# Patient Record
Sex: MALE | Race: WHITE | NOT HISPANIC OR LATINO | Employment: FULL TIME | URBAN - METROPOLITAN AREA
[De-identification: names, ages, dates, MRNs, and addresses within clinical notes are randomized per-mention and may not be internally consistent; named-entity substitution may affect disease eponyms.]

---

## 2021-09-28 ENCOUNTER — APPOINTMENT (OUTPATIENT)
Dept: GENERAL RADIOLOGY | Facility: HOSPITAL | Age: 46
End: 2021-09-28

## 2021-09-28 ENCOUNTER — APPOINTMENT (OUTPATIENT)
Dept: CT IMAGING | Facility: HOSPITAL | Age: 46
End: 2021-09-28

## 2021-09-28 ENCOUNTER — HOSPITAL ENCOUNTER (INPATIENT)
Facility: HOSPITAL | Age: 46
LOS: 3 days | Discharge: HOME | End: 2021-10-01
Attending: EMERGENCY MEDICINE | Admitting: INTERNAL MEDICINE

## 2021-09-28 DIAGNOSIS — J93.9 PNEUMOTHORAX ON LEFT: Primary | ICD-10-CM

## 2021-09-28 DIAGNOSIS — R26.2 DIFFICULTY WALKING: ICD-10-CM

## 2021-09-28 DIAGNOSIS — Z78.9 DECREASED ACTIVITIES OF DAILY LIVING (ADL): ICD-10-CM

## 2021-09-28 LAB
ALBUMIN SERPL-MCNC: 4.6 G/DL (ref 3.5–5.2)
ALBUMIN/GLOB SERPL: 2.1 G/DL
ALP SERPL-CCNC: 83 U/L (ref 39–117)
ALT SERPL W P-5'-P-CCNC: 27 U/L (ref 1–41)
ANION GAP SERPL CALCULATED.3IONS-SCNC: 15.1 MMOL/L (ref 5–15)
ANION GAP SERPL CALCULATED.3IONS-SCNC: 8.9 MMOL/L (ref 5–15)
AST SERPL-CCNC: 32 U/L (ref 1–40)
BASOPHILS # BLD AUTO: 0.02 10*3/MM3 (ref 0–0.2)
BASOPHILS # BLD AUTO: 0.04 10*3/MM3 (ref 0–0.2)
BASOPHILS NFR BLD AUTO: 0.2 % (ref 0–1.5)
BASOPHILS NFR BLD AUTO: 0.5 % (ref 0–1.5)
BILIRUB SERPL-MCNC: 0.4 MG/DL (ref 0–1.2)
BUN SERPL-MCNC: 15 MG/DL (ref 6–20)
BUN SERPL-MCNC: 15 MG/DL (ref 6–20)
BUN/CREAT SERPL: 13.2 (ref 7–25)
BUN/CREAT SERPL: 14.7 (ref 7–25)
CALCIUM SPEC-SCNC: 9.3 MG/DL (ref 8.6–10.5)
CALCIUM SPEC-SCNC: 9.4 MG/DL (ref 8.6–10.5)
CHLORIDE SERPL-SCNC: 102 MMOL/L (ref 98–107)
CHLORIDE SERPL-SCNC: 105 MMOL/L (ref 98–107)
CO2 SERPL-SCNC: 22.9 MMOL/L (ref 22–29)
CO2 SERPL-SCNC: 25.1 MMOL/L (ref 22–29)
CREAT SERPL-MCNC: 1.02 MG/DL (ref 0.76–1.27)
CREAT SERPL-MCNC: 1.14 MG/DL (ref 0.76–1.27)
D-LACTATE SERPL-SCNC: 1.3 MMOL/L (ref 0.5–2)
D-LACTATE SERPL-SCNC: 5.4 MMOL/L (ref 0.5–2)
DEPRECATED RDW RBC AUTO: 44.3 FL (ref 37–54)
DEPRECATED RDW RBC AUTO: 45.1 FL (ref 37–54)
EOSINOPHIL # BLD AUTO: 0 10*3/MM3 (ref 0–0.4)
EOSINOPHIL # BLD AUTO: 0.1 10*3/MM3 (ref 0–0.4)
EOSINOPHIL NFR BLD AUTO: 0 % (ref 0.3–6.2)
EOSINOPHIL NFR BLD AUTO: 1.4 % (ref 0.3–6.2)
ERYTHROCYTE [DISTWIDTH] IN BLOOD BY AUTOMATED COUNT: 13.1 % (ref 12.3–15.4)
ERYTHROCYTE [DISTWIDTH] IN BLOOD BY AUTOMATED COUNT: 13.1 % (ref 12.3–15.4)
GFR SERPL CREATININE-BSD FRML MDRD: 69 ML/MIN/1.73
GFR SERPL CREATININE-BSD FRML MDRD: 78 ML/MIN/1.73
GLOBULIN UR ELPH-MCNC: 2.2 GM/DL
GLUCOSE SERPL-MCNC: 146 MG/DL (ref 65–99)
GLUCOSE SERPL-MCNC: 180 MG/DL (ref 65–99)
HCT VFR BLD AUTO: 45.2 % (ref 37.5–51)
HCT VFR BLD AUTO: 47.6 % (ref 37.5–51)
HGB BLD-MCNC: 15.2 G/DL (ref 13–17.7)
HGB BLD-MCNC: 15.7 G/DL (ref 13–17.7)
HOLD SPECIMEN: NORMAL
HOLD SPECIMEN: NORMAL
IMM GRANULOCYTES # BLD AUTO: 0.02 10*3/MM3 (ref 0–0.05)
IMM GRANULOCYTES # BLD AUTO: 0.03 10*3/MM3 (ref 0–0.05)
IMM GRANULOCYTES NFR BLD AUTO: 0.3 % (ref 0–0.5)
IMM GRANULOCYTES NFR BLD AUTO: 0.3 % (ref 0–0.5)
INR PPP: 0.99 (ref 2–3)
LYMPHOCYTES # BLD AUTO: 0.43 10*3/MM3 (ref 0.7–3.1)
LYMPHOCYTES # BLD AUTO: 2.01 10*3/MM3 (ref 0.7–3.1)
LYMPHOCYTES NFR BLD AUTO: 27.2 % (ref 19.6–45.3)
LYMPHOCYTES NFR BLD AUTO: 4 % (ref 19.6–45.3)
MAGNESIUM SERPL-MCNC: 2 MG/DL (ref 1.6–2.6)
MCH RBC QN AUTO: 31 PG (ref 26.6–33)
MCH RBC QN AUTO: 31.1 PG (ref 26.6–33)
MCHC RBC AUTO-ENTMCNC: 33 G/DL (ref 31.5–35.7)
MCHC RBC AUTO-ENTMCNC: 33.6 G/DL (ref 31.5–35.7)
MCV RBC AUTO: 92.4 FL (ref 79–97)
MCV RBC AUTO: 94.1 FL (ref 79–97)
MONOCYTES # BLD AUTO: 0.11 10*3/MM3 (ref 0.1–0.9)
MONOCYTES # BLD AUTO: 0.6 10*3/MM3 (ref 0.1–0.9)
MONOCYTES NFR BLD AUTO: 1 % (ref 5–12)
MONOCYTES NFR BLD AUTO: 8.1 % (ref 5–12)
NEUTROPHILS NFR BLD AUTO: 10.28 10*3/MM3 (ref 1.7–7)
NEUTROPHILS NFR BLD AUTO: 4.61 10*3/MM3 (ref 1.7–7)
NEUTROPHILS NFR BLD AUTO: 62.5 % (ref 42.7–76)
NEUTROPHILS NFR BLD AUTO: 94.5 % (ref 42.7–76)
NRBC BLD AUTO-RTO: 0 /100 WBC (ref 0–0.2)
NRBC BLD AUTO-RTO: 0 /100 WBC (ref 0–0.2)
NT-PROBNP SERPL-MCNC: 26.4 PG/ML (ref 0–450)
PHOSPHATE SERPL-MCNC: 2.1 MG/DL (ref 2.5–4.5)
PLATELET # BLD AUTO: 230 10*3/MM3 (ref 140–450)
PLATELET # BLD AUTO: 254 10*3/MM3 (ref 140–450)
PMV BLD AUTO: 9.8 FL (ref 6–12)
PMV BLD AUTO: 9.9 FL (ref 6–12)
POTASSIUM SERPL-SCNC: 4.1 MMOL/L (ref 3.5–5.2)
POTASSIUM SERPL-SCNC: 4.8 MMOL/L (ref 3.5–5.2)
PROT SERPL-MCNC: 6.8 G/DL (ref 6–8.5)
PROTHROMBIN TIME: 10.9 SECONDS (ref 9.4–12)
RBC # BLD AUTO: 4.89 10*6/MM3 (ref 4.14–5.8)
RBC # BLD AUTO: 5.06 10*6/MM3 (ref 4.14–5.8)
SODIUM SERPL-SCNC: 139 MMOL/L (ref 136–145)
SODIUM SERPL-SCNC: 140 MMOL/L (ref 136–145)
TROPONIN I SERPL-MCNC: 0.01 NG/ML (ref 0–0.6)
TROPONIN T SERPL-MCNC: <0.01 NG/ML (ref 0–0.03)
WBC # BLD AUTO: 10.87 10*3/MM3 (ref 3.4–10.8)
WBC # BLD AUTO: 7.38 10*3/MM3 (ref 3.4–10.8)
WHOLE BLOOD HOLD SPECIMEN: NORMAL
WHOLE BLOOD HOLD SPECIMEN: NORMAL

## 2021-09-28 PROCEDURE — 71045 X-RAY EXAM CHEST 1 VIEW: CPT

## 2021-09-28 PROCEDURE — 0W9B30Z DRAINAGE OF LEFT PLEURAL CAVITY WITH DRAINAGE DEVICE, PERCUTANEOUS APPROACH: ICD-10-PCS | Performed by: EMERGENCY MEDICINE

## 2021-09-28 PROCEDURE — 25010000002 LORAZEPAM PER 2 MG

## 2021-09-28 PROCEDURE — 80053 COMPREHEN METABOLIC PANEL: CPT | Performed by: HOSPITALIST

## 2021-09-28 PROCEDURE — 83880 ASSAY OF NATRIURETIC PEPTIDE: CPT | Performed by: EMERGENCY MEDICINE

## 2021-09-28 PROCEDURE — 84484 ASSAY OF TROPONIN QUANT: CPT

## 2021-09-28 PROCEDURE — 99285 EMERGENCY DEPT VISIT HI MDM: CPT

## 2021-09-28 PROCEDURE — 85610 PROTHROMBIN TIME: CPT | Performed by: HOSPITALIST

## 2021-09-28 PROCEDURE — 84100 ASSAY OF PHOSPHORUS: CPT | Performed by: HOSPITALIST

## 2021-09-28 PROCEDURE — 85025 COMPLETE CBC W/AUTO DIFF WBC: CPT | Performed by: EMERGENCY MEDICINE

## 2021-09-28 PROCEDURE — 87040 BLOOD CULTURE FOR BACTERIA: CPT | Performed by: EMERGENCY MEDICINE

## 2021-09-28 PROCEDURE — 83735 ASSAY OF MAGNESIUM: CPT | Performed by: HOSPITALIST

## 2021-09-28 PROCEDURE — 25010000002 ENOXAPARIN PER 10 MG: Performed by: HOSPITALIST

## 2021-09-28 PROCEDURE — 93010 ELECTROCARDIOGRAM REPORT: CPT | Performed by: SPECIALIST

## 2021-09-28 PROCEDURE — 93005 ELECTROCARDIOGRAM TRACING: CPT | Performed by: EMERGENCY MEDICINE

## 2021-09-28 PROCEDURE — 83605 ASSAY OF LACTIC ACID: CPT | Performed by: EMERGENCY MEDICINE

## 2021-09-28 PROCEDURE — 85025 COMPLETE CBC W/AUTO DIFF WBC: CPT | Performed by: HOSPITALIST

## 2021-09-28 PROCEDURE — 93005 ELECTROCARDIOGRAM TRACING: CPT

## 2021-09-28 PROCEDURE — 94640 AIRWAY INHALATION TREATMENT: CPT

## 2021-09-28 PROCEDURE — 71260 CT THORAX DX C+: CPT

## 2021-09-28 PROCEDURE — G0432 EIA HIV-1/HIV-2 SCREEN: HCPCS | Performed by: INTERNAL MEDICINE

## 2021-09-28 PROCEDURE — 0 IOPAMIDOL PER 1 ML: Performed by: EMERGENCY MEDICINE

## 2021-09-28 PROCEDURE — 99223 1ST HOSP IP/OBS HIGH 75: CPT | Performed by: HOSPITALIST

## 2021-09-28 PROCEDURE — 84484 ASSAY OF TROPONIN QUANT: CPT | Performed by: EMERGENCY MEDICINE

## 2021-09-28 RX ORDER — TRAMADOL HYDROCHLORIDE 50 MG/1
25 TABLET ORAL EVERY 6 HOURS PRN
Status: DISCONTINUED | OUTPATIENT
Start: 2021-09-28 | End: 2021-10-01 | Stop reason: HOSPADM

## 2021-09-28 RX ORDER — SODIUM CHLORIDE 0.9 % (FLUSH) 0.9 %
10 SYRINGE (ML) INJECTION EVERY 12 HOURS SCHEDULED
Status: DISCONTINUED | OUTPATIENT
Start: 2021-09-28 | End: 2021-10-01 | Stop reason: HOSPADM

## 2021-09-28 RX ORDER — ONDANSETRON 4 MG/1
4 TABLET, FILM COATED ORAL EVERY 6 HOURS PRN
Status: DISCONTINUED | OUTPATIENT
Start: 2021-09-28 | End: 2021-10-01 | Stop reason: HOSPADM

## 2021-09-28 RX ORDER — SODIUM CHLORIDE 9 MG/ML
75 INJECTION, SOLUTION INTRAVENOUS CONTINUOUS
Status: DISCONTINUED | OUTPATIENT
Start: 2021-09-28 | End: 2021-09-30

## 2021-09-28 RX ORDER — LIDOCAINE HYDROCHLORIDE 10 MG/ML
INJECTION, SOLUTION EPIDURAL; INFILTRATION; INTRACAUDAL; PERINEURAL
Status: DISPENSED
Start: 2021-09-28 | End: 2021-09-29

## 2021-09-28 RX ORDER — FLUOXETINE HYDROCHLORIDE 20 MG/1
20 CAPSULE ORAL DAILY
Status: DISCONTINUED | OUTPATIENT
Start: 2021-09-28 | End: 2021-09-28

## 2021-09-28 RX ORDER — ETOMIDATE 2 MG/ML
INJECTION INTRAVENOUS
Status: COMPLETED
Start: 2021-09-28 | End: 2021-09-28

## 2021-09-28 RX ORDER — ETOMIDATE 2 MG/ML
10 INJECTION INTRAVENOUS ONCE
Status: COMPLETED | OUTPATIENT
Start: 2021-09-28 | End: 2021-09-28

## 2021-09-28 RX ORDER — BISACODYL 10 MG
10 SUPPOSITORY, RECTAL RECTAL DAILY PRN
Status: DISCONTINUED | OUTPATIENT
Start: 2021-09-28 | End: 2021-10-01 | Stop reason: HOSPADM

## 2021-09-28 RX ORDER — ARFORMOTEROL TARTRATE 15 UG/2ML
15 SOLUTION RESPIRATORY (INHALATION)
Status: DISCONTINUED | OUTPATIENT
Start: 2021-09-28 | End: 2021-10-01 | Stop reason: HOSPADM

## 2021-09-28 RX ORDER — CHOLECALCIFEROL (VITAMIN D3) 125 MCG
5 CAPSULE ORAL NIGHTLY PRN
Status: DISCONTINUED | OUTPATIENT
Start: 2021-09-28 | End: 2021-10-01 | Stop reason: HOSPADM

## 2021-09-28 RX ORDER — NITROGLYCERIN 0.4 MG/1
0.4 TABLET SUBLINGUAL
Status: DISCONTINUED | OUTPATIENT
Start: 2021-09-28 | End: 2021-10-01 | Stop reason: HOSPADM

## 2021-09-28 RX ORDER — SODIUM CHLORIDE 0.9 % (FLUSH) 0.9 %
10 SYRINGE (ML) INJECTION AS NEEDED
Status: DISCONTINUED | OUTPATIENT
Start: 2021-09-28 | End: 2021-10-01 | Stop reason: HOSPADM

## 2021-09-28 RX ORDER — POLYETHYLENE GLYCOL 3350 17 G/17G
17 POWDER, FOR SOLUTION ORAL DAILY PRN
Status: DISCONTINUED | OUTPATIENT
Start: 2021-09-28 | End: 2021-10-01 | Stop reason: HOSPADM

## 2021-09-28 RX ORDER — IPRATROPIUM BROMIDE AND ALBUTEROL SULFATE 2.5; .5 MG/3ML; MG/3ML
3 SOLUTION RESPIRATORY (INHALATION)
Status: DISCONTINUED | OUTPATIENT
Start: 2021-09-28 | End: 2021-10-01 | Stop reason: HOSPADM

## 2021-09-28 RX ORDER — HYDROXYZINE HYDROCHLORIDE 25 MG/1
25 TABLET, FILM COATED ORAL 3 TIMES DAILY PRN
Status: DISCONTINUED | OUTPATIENT
Start: 2021-09-28 | End: 2021-10-01 | Stop reason: HOSPADM

## 2021-09-28 RX ORDER — BISACODYL 5 MG/1
5 TABLET, DELAYED RELEASE ORAL DAILY PRN
Status: DISCONTINUED | OUTPATIENT
Start: 2021-09-28 | End: 2021-10-01 | Stop reason: HOSPADM

## 2021-09-28 RX ORDER — AMOXICILLIN 250 MG
2 CAPSULE ORAL 2 TIMES DAILY
Status: DISCONTINUED | OUTPATIENT
Start: 2021-09-28 | End: 2021-10-01 | Stop reason: HOSPADM

## 2021-09-28 RX ORDER — ACETAMINOPHEN 650 MG/1
650 SUPPOSITORY RECTAL EVERY 4 HOURS PRN
Status: DISCONTINUED | OUTPATIENT
Start: 2021-09-28 | End: 2021-10-01 | Stop reason: HOSPADM

## 2021-09-28 RX ORDER — ONDANSETRON 2 MG/ML
4 INJECTION INTRAMUSCULAR; INTRAVENOUS EVERY 6 HOURS PRN
Status: DISCONTINUED | OUTPATIENT
Start: 2021-09-28 | End: 2021-10-01 | Stop reason: HOSPADM

## 2021-09-28 RX ORDER — LORAZEPAM 2 MG/ML
INJECTION INTRAMUSCULAR
Status: COMPLETED
Start: 2021-09-28 | End: 2021-09-28

## 2021-09-28 RX ADMIN — SODIUM CHLORIDE 1000 ML: 9 INJECTION, SOLUTION INTRAVENOUS at 19:24

## 2021-09-28 RX ADMIN — ETOMIDATE 10 MG: 40 INJECTION, SOLUTION INTRAVENOUS at 20:04

## 2021-09-28 RX ADMIN — IOPAMIDOL 100 ML: 755 INJECTION, SOLUTION INTRAVENOUS at 17:10

## 2021-09-28 RX ADMIN — ETOMIDATE 10 MG: 2 INJECTION INTRAVENOUS at 20:00

## 2021-09-28 RX ADMIN — ENOXAPARIN SODIUM 40 MG: 40 INJECTION, SOLUTION INTRAVENOUS; SUBCUTANEOUS at 22:17

## 2021-09-28 RX ADMIN — DOCUSATE SODIUM 50MG AND SENNOSIDES 8.6MG 2 TABLET: 8.6; 5 TABLET, FILM COATED ORAL at 22:17

## 2021-09-28 RX ADMIN — IPRATROPIUM BROMIDE AND ALBUTEROL SULFATE 3 ML: .5; 2.5 SOLUTION RESPIRATORY (INHALATION) at 22:18

## 2021-09-28 RX ADMIN — ETOMIDATE 10 MG: 40 INJECTION, SOLUTION INTRAVENOUS at 20:00

## 2021-09-28 RX ADMIN — ARFORMOTEROL TARTRATE 15 MCG: 15 SOLUTION RESPIRATORY (INHALATION) at 22:18

## 2021-09-28 RX ADMIN — SODIUM CHLORIDE 75 ML/HR: 9 INJECTION, SOLUTION INTRAVENOUS at 19:35

## 2021-09-28 RX ADMIN — LORAZEPAM 2 MG: 2 INJECTION INTRAMUSCULAR; INTRAVENOUS at 20:12

## 2021-09-29 ENCOUNTER — APPOINTMENT (OUTPATIENT)
Dept: CT IMAGING | Facility: HOSPITAL | Age: 46
End: 2021-09-29

## 2021-09-29 ENCOUNTER — APPOINTMENT (OUTPATIENT)
Dept: GENERAL RADIOLOGY | Facility: HOSPITAL | Age: 46
End: 2021-09-29

## 2021-09-29 LAB
ANION GAP SERPL CALCULATED.3IONS-SCNC: 8 MMOL/L (ref 5–15)
BASOPHILS # BLD AUTO: 0.01 10*3/MM3 (ref 0–0.2)
BASOPHILS NFR BLD AUTO: 0.1 % (ref 0–1.5)
BUN SERPL-MCNC: 14 MG/DL (ref 6–20)
BUN/CREAT SERPL: 14.6 (ref 7–25)
CALCIUM SPEC-SCNC: 9 MG/DL (ref 8.6–10.5)
CHLORIDE SERPL-SCNC: 107 MMOL/L (ref 98–107)
CO2 SERPL-SCNC: 23 MMOL/L (ref 22–29)
CREAT SERPL-MCNC: 0.96 MG/DL (ref 0.76–1.27)
DEPRECATED RDW RBC AUTO: 42.2 FL (ref 37–54)
EOSINOPHIL # BLD AUTO: 0 10*3/MM3 (ref 0–0.4)
EOSINOPHIL NFR BLD AUTO: 0 % (ref 0.3–6.2)
ERYTHROCYTE [DISTWIDTH] IN BLOOD BY AUTOMATED COUNT: 12.8 % (ref 12.3–15.4)
GFR SERPL CREATININE-BSD FRML MDRD: 84 ML/MIN/1.73
GLUCOSE SERPL-MCNC: 174 MG/DL (ref 65–99)
HCT VFR BLD AUTO: 42.2 % (ref 37.5–51)
HGB BLD-MCNC: 14.5 G/DL (ref 13–17.7)
HIV1+2 AB SER QL: NORMAL
IMM GRANULOCYTES # BLD AUTO: 0.02 10*3/MM3 (ref 0–0.05)
IMM GRANULOCYTES NFR BLD AUTO: 0.3 % (ref 0–0.5)
INR PPP: 0.97 (ref 2–3)
LYMPHOCYTES # BLD AUTO: 0.83 10*3/MM3 (ref 0.7–3.1)
LYMPHOCYTES NFR BLD AUTO: 10.7 % (ref 19.6–45.3)
MAGNESIUM SERPL-MCNC: 2 MG/DL (ref 1.6–2.6)
MCH RBC QN AUTO: 31.1 PG (ref 26.6–33)
MCHC RBC AUTO-ENTMCNC: 34.4 G/DL (ref 31.5–35.7)
MCV RBC AUTO: 90.6 FL (ref 79–97)
MONOCYTES # BLD AUTO: 0.54 10*3/MM3 (ref 0.1–0.9)
MONOCYTES NFR BLD AUTO: 6.9 % (ref 5–12)
NEUTROPHILS NFR BLD AUTO: 6.38 10*3/MM3 (ref 1.7–7)
NEUTROPHILS NFR BLD AUTO: 82 % (ref 42.7–76)
NRBC BLD AUTO-RTO: 0 /100 WBC (ref 0–0.2)
PHOSPHATE SERPL-MCNC: 3.2 MG/DL (ref 2.5–4.5)
PLATELET # BLD AUTO: 219 10*3/MM3 (ref 140–450)
PMV BLD AUTO: 10.2 FL (ref 6–12)
POTASSIUM SERPL-SCNC: 4.6 MMOL/L (ref 3.5–5.2)
PROTHROMBIN TIME: 10.7 SECONDS (ref 9.4–12)
RBC # BLD AUTO: 4.66 10*6/MM3 (ref 4.14–5.8)
SODIUM SERPL-SCNC: 138 MMOL/L (ref 136–145)
WBC # BLD AUTO: 7.78 10*3/MM3 (ref 3.4–10.8)

## 2021-09-29 PROCEDURE — 85610 PROTHROMBIN TIME: CPT | Performed by: HOSPITALIST

## 2021-09-29 PROCEDURE — 99233 SBSQ HOSP IP/OBS HIGH 50: CPT | Performed by: INTERNAL MEDICINE

## 2021-09-29 PROCEDURE — 25010000002 ENOXAPARIN PER 10 MG: Performed by: HOSPITALIST

## 2021-09-29 PROCEDURE — 97165 OT EVAL LOW COMPLEX 30 MIN: CPT

## 2021-09-29 PROCEDURE — 83735 ASSAY OF MAGNESIUM: CPT | Performed by: HOSPITALIST

## 2021-09-29 PROCEDURE — 94799 UNLISTED PULMONARY SVC/PX: CPT

## 2021-09-29 PROCEDURE — 81332 SERPINA1 GENE: CPT | Performed by: INTERNAL MEDICINE

## 2021-09-29 PROCEDURE — 82103 ALPHA-1-ANTITRYPSIN TOTAL: CPT | Performed by: INTERNAL MEDICINE

## 2021-09-29 PROCEDURE — 97161 PT EVAL LOW COMPLEX 20 MIN: CPT

## 2021-09-29 PROCEDURE — 80048 BASIC METABOLIC PNL TOTAL CA: CPT | Performed by: HOSPITALIST

## 2021-09-29 PROCEDURE — 71045 X-RAY EXAM CHEST 1 VIEW: CPT

## 2021-09-29 PROCEDURE — 84100 ASSAY OF PHOSPHORUS: CPT | Performed by: HOSPITALIST

## 2021-09-29 PROCEDURE — 36415 COLL VENOUS BLD VENIPUNCTURE: CPT | Performed by: HOSPITALIST

## 2021-09-29 PROCEDURE — 85025 COMPLETE CBC W/AUTO DIFF WBC: CPT | Performed by: HOSPITALIST

## 2021-09-29 PROCEDURE — 99223 1ST HOSP IP/OBS HIGH 75: CPT | Performed by: INTERNAL MEDICINE

## 2021-09-29 PROCEDURE — 82104 ALPHA-1-ANTITRYPSIN PHENO: CPT | Performed by: INTERNAL MEDICINE

## 2021-09-29 PROCEDURE — 71250 CT THORAX DX C-: CPT

## 2021-09-29 RX ORDER — TRAZODONE HYDROCHLORIDE 50 MG/1
50 TABLET ORAL NIGHTLY PRN
Status: DISCONTINUED | OUTPATIENT
Start: 2021-09-29 | End: 2021-10-01 | Stop reason: HOSPADM

## 2021-09-29 RX ADMIN — ARFORMOTEROL TARTRATE 15 MCG: 15 SOLUTION RESPIRATORY (INHALATION) at 18:45

## 2021-09-29 RX ADMIN — IPRATROPIUM BROMIDE AND ALBUTEROL SULFATE 3 ML: .5; 2.5 SOLUTION RESPIRATORY (INHALATION) at 07:35

## 2021-09-29 RX ADMIN — ARFORMOTEROL TARTRATE 15 MCG: 15 SOLUTION RESPIRATORY (INHALATION) at 07:35

## 2021-09-29 RX ADMIN — ENOXAPARIN SODIUM 40 MG: 40 INJECTION, SOLUTION INTRAVENOUS; SUBCUTANEOUS at 20:15

## 2021-09-29 RX ADMIN — IPRATROPIUM BROMIDE AND ALBUTEROL SULFATE 3 ML: .5; 2.5 SOLUTION RESPIRATORY (INHALATION) at 13:04

## 2021-09-29 RX ADMIN — SODIUM CHLORIDE 75 ML/HR: 9 INJECTION, SOLUTION INTRAVENOUS at 15:00

## 2021-09-29 RX ADMIN — IPRATROPIUM BROMIDE AND ALBUTEROL SULFATE 3 ML: .5; 2.5 SOLUTION RESPIRATORY (INHALATION) at 18:45

## 2021-09-29 RX ADMIN — SODIUM CHLORIDE, PRESERVATIVE FREE 10 ML: 5 INJECTION INTRAVENOUS at 11:00

## 2021-09-29 RX ADMIN — SODIUM CHLORIDE, PRESERVATIVE FREE 10 ML: 5 INJECTION INTRAVENOUS at 20:14

## 2021-09-30 ENCOUNTER — APPOINTMENT (OUTPATIENT)
Dept: GENERAL RADIOLOGY | Facility: HOSPITAL | Age: 46
End: 2021-09-30

## 2021-09-30 LAB
ANION GAP SERPL CALCULATED.3IONS-SCNC: 7.2 MMOL/L (ref 5–15)
BASOPHILS # BLD AUTO: 0.02 10*3/MM3 (ref 0–0.2)
BASOPHILS NFR BLD AUTO: 0.3 % (ref 0–1.5)
BUN SERPL-MCNC: 17 MG/DL (ref 6–20)
BUN/CREAT SERPL: 15.2 (ref 7–25)
CALCIUM SPEC-SCNC: 8.7 MG/DL (ref 8.6–10.5)
CHLORIDE SERPL-SCNC: 108 MMOL/L (ref 98–107)
CO2 SERPL-SCNC: 24.8 MMOL/L (ref 22–29)
CREAT SERPL-MCNC: 1.12 MG/DL (ref 0.76–1.27)
DEPRECATED RDW RBC AUTO: 44.2 FL (ref 37–54)
EOSINOPHIL # BLD AUTO: 0.09 10*3/MM3 (ref 0–0.4)
EOSINOPHIL NFR BLD AUTO: 1.3 % (ref 0.3–6.2)
ERYTHROCYTE [DISTWIDTH] IN BLOOD BY AUTOMATED COUNT: 13.2 % (ref 12.3–15.4)
GFR SERPL CREATININE-BSD FRML MDRD: 70 ML/MIN/1.73
GLUCOSE SERPL-MCNC: 94 MG/DL (ref 65–99)
HCT VFR BLD AUTO: 40.6 % (ref 37.5–51)
HGB BLD-MCNC: 13.7 G/DL (ref 13–17.7)
IMM GRANULOCYTES # BLD AUTO: 0.02 10*3/MM3 (ref 0–0.05)
IMM GRANULOCYTES NFR BLD AUTO: 0.3 % (ref 0–0.5)
INR PPP: 0.93 (ref 2–3)
LYMPHOCYTES # BLD AUTO: 1.61 10*3/MM3 (ref 0.7–3.1)
LYMPHOCYTES NFR BLD AUTO: 23.2 % (ref 19.6–45.3)
MAGNESIUM SERPL-MCNC: 2 MG/DL (ref 1.6–2.6)
MCH RBC QN AUTO: 30.9 PG (ref 26.6–33)
MCHC RBC AUTO-ENTMCNC: 33.7 G/DL (ref 31.5–35.7)
MCV RBC AUTO: 91.4 FL (ref 79–97)
MONOCYTES # BLD AUTO: 0.51 10*3/MM3 (ref 0.1–0.9)
MONOCYTES NFR BLD AUTO: 7.3 % (ref 5–12)
NEUTROPHILS NFR BLD AUTO: 4.7 10*3/MM3 (ref 1.7–7)
NEUTROPHILS NFR BLD AUTO: 67.6 % (ref 42.7–76)
NRBC BLD AUTO-RTO: 0 /100 WBC (ref 0–0.2)
PHOSPHATE SERPL-MCNC: 2.9 MG/DL (ref 2.5–4.5)
PLATELET # BLD AUTO: 230 10*3/MM3 (ref 140–450)
PMV BLD AUTO: 10.5 FL (ref 6–12)
POTASSIUM SERPL-SCNC: 4.2 MMOL/L (ref 3.5–5.2)
PROTHROMBIN TIME: 10.4 SECONDS (ref 9.4–12)
RBC # BLD AUTO: 4.44 10*6/MM3 (ref 4.14–5.8)
SODIUM SERPL-SCNC: 140 MMOL/L (ref 136–145)
WBC # BLD AUTO: 6.95 10*3/MM3 (ref 3.4–10.8)

## 2021-09-30 PROCEDURE — 85610 PROTHROMBIN TIME: CPT | Performed by: HOSPITALIST

## 2021-09-30 PROCEDURE — 36415 COLL VENOUS BLD VENIPUNCTURE: CPT | Performed by: HOSPITALIST

## 2021-09-30 PROCEDURE — 94760 N-INVAS EAR/PLS OXIMETRY 1: CPT

## 2021-09-30 PROCEDURE — 94799 UNLISTED PULMONARY SVC/PX: CPT

## 2021-09-30 PROCEDURE — 99233 SBSQ HOSP IP/OBS HIGH 50: CPT | Performed by: INTERNAL MEDICINE

## 2021-09-30 PROCEDURE — 71045 X-RAY EXAM CHEST 1 VIEW: CPT

## 2021-09-30 PROCEDURE — 99232 SBSQ HOSP IP/OBS MODERATE 35: CPT | Performed by: INTERNAL MEDICINE

## 2021-09-30 PROCEDURE — 84100 ASSAY OF PHOSPHORUS: CPT | Performed by: HOSPITALIST

## 2021-09-30 PROCEDURE — 80048 BASIC METABOLIC PNL TOTAL CA: CPT | Performed by: HOSPITALIST

## 2021-09-30 PROCEDURE — 83735 ASSAY OF MAGNESIUM: CPT | Performed by: HOSPITALIST

## 2021-09-30 PROCEDURE — 85025 COMPLETE CBC W/AUTO DIFF WBC: CPT | Performed by: HOSPITALIST

## 2021-09-30 RX ADMIN — ARFORMOTEROL TARTRATE 15 MCG: 15 SOLUTION RESPIRATORY (INHALATION) at 19:50

## 2021-09-30 RX ADMIN — ARFORMOTEROL TARTRATE 15 MCG: 15 SOLUTION RESPIRATORY (INHALATION) at 07:23

## 2021-09-30 RX ADMIN — SODIUM CHLORIDE, PRESERVATIVE FREE 10 ML: 5 INJECTION INTRAVENOUS at 10:10

## 2021-09-30 RX ADMIN — IPRATROPIUM BROMIDE AND ALBUTEROL SULFATE 3 ML: .5; 2.5 SOLUTION RESPIRATORY (INHALATION) at 07:23

## 2021-09-30 RX ADMIN — SODIUM CHLORIDE, PRESERVATIVE FREE 10 ML: 5 INJECTION INTRAVENOUS at 20:17

## 2021-09-30 RX ADMIN — SODIUM CHLORIDE 75 ML/HR: 9 INJECTION, SOLUTION INTRAVENOUS at 03:20

## 2021-09-30 RX ADMIN — IPRATROPIUM BROMIDE AND ALBUTEROL SULFATE 3 ML: .5; 2.5 SOLUTION RESPIRATORY (INHALATION) at 12:48

## 2021-09-30 RX ADMIN — IPRATROPIUM BROMIDE AND ALBUTEROL SULFATE 3 ML: .5; 2.5 SOLUTION RESPIRATORY (INHALATION) at 23:49

## 2021-09-30 RX ADMIN — IPRATROPIUM BROMIDE AND ALBUTEROL SULFATE 3 ML: .5; 2.5 SOLUTION RESPIRATORY (INHALATION) at 00:42

## 2021-10-01 ENCOUNTER — APPOINTMENT (OUTPATIENT)
Dept: GENERAL RADIOLOGY | Facility: HOSPITAL | Age: 46
End: 2021-10-01

## 2021-10-01 ENCOUNTER — READMISSION MANAGEMENT (OUTPATIENT)
Dept: CALL CENTER | Facility: HOSPITAL | Age: 46
End: 2021-10-01

## 2021-10-01 VITALS
OXYGEN SATURATION: 97 % | DIASTOLIC BLOOD PRESSURE: 77 MMHG | TEMPERATURE: 97.7 F | HEART RATE: 97 BPM | BODY MASS INDEX: 19.95 KG/M2 | HEIGHT: 68 IN | RESPIRATION RATE: 20 BRPM | SYSTOLIC BLOOD PRESSURE: 112 MMHG | WEIGHT: 131.61 LBS

## 2021-10-01 PROCEDURE — 94799 UNLISTED PULMONARY SVC/PX: CPT

## 2021-10-01 PROCEDURE — 71046 X-RAY EXAM CHEST 2 VIEWS: CPT

## 2021-10-01 PROCEDURE — 99232 SBSQ HOSP IP/OBS MODERATE 35: CPT | Performed by: INTERNAL MEDICINE

## 2021-10-01 PROCEDURE — 71045 X-RAY EXAM CHEST 1 VIEW: CPT

## 2021-10-01 PROCEDURE — 99238 HOSP IP/OBS DSCHRG MGMT 30/<: CPT | Performed by: INTERNAL MEDICINE

## 2021-10-01 RX ORDER — ALBUTEROL SULFATE 90 UG/1
2 AEROSOL, METERED RESPIRATORY (INHALATION) EVERY 4 HOURS PRN
Qty: 8 G | Refills: 0 | Status: SHIPPED | OUTPATIENT
Start: 2021-10-01 | End: 2021-11-10

## 2021-10-01 RX ADMIN — IPRATROPIUM BROMIDE AND ALBUTEROL SULFATE 3 ML: .5; 2.5 SOLUTION RESPIRATORY (INHALATION) at 08:06

## 2021-10-01 RX ADMIN — ARFORMOTEROL TARTRATE 15 MCG: 15 SOLUTION RESPIRATORY (INHALATION) at 08:06

## 2021-10-01 RX ADMIN — IPRATROPIUM BROMIDE AND ALBUTEROL SULFATE 3 ML: .5; 2.5 SOLUTION RESPIRATORY (INHALATION) at 14:25

## 2021-10-01 RX ADMIN — SODIUM CHLORIDE, PRESERVATIVE FREE 10 ML: 5 INJECTION INTRAVENOUS at 11:04

## 2021-10-01 NOTE — PLAN OF CARE
Goal Outcome Evaluation:           Progress: improving  Outcome Summary: Chest tube to water seal and no change to chest xray this evening even though crepitus more noted. Patient trailing RA, practicing IS, and up walking ad valerie.

## 2021-10-01 NOTE — PROGRESS NOTES
Pulmonary / Critical Care Progress Note      Patient Name: Yogesh Vicente  : 1975  MRN: [id number]  Attending:  Mary Carter, *  Date of admission: 2021    Subjective   Subjective   Follow-up for Left pneumothorax    Over past 24 hours:  Chest tube to waterseal  Continued on room air        Today  Chest x-ray reviewed, no pneumothorax noted  Chest tube clamped  Remains on room air  Is hoping to go home today  Denies shortness of breath  Denies chest pain    Review of Systems  General: Denied complaints  Cardiovascular:  Denied complaints  Respiratory: Denied complaints  Gastrointestinal: Denied complaints        Objective   Objective     Vitals:   Temp:  [97.6 °F (36.4 °C)-98.2 °F (36.8 °C)] 98.2 °F (36.8 °C)  Heart Rate:  [] 84  Resp:  [18-22] 22  BP: (114-128)/(77-88) 114/88    Physical Exam   Vital Signs Reviewed   WDWN, Alert, NAD.    HEENT:  PERRL, EOMI.  OP, nares clear  Chest wall: Left anterior chest tube in place, dressing C/D/I; crepitus noted  Chest:  good aeration, clear to auscultation bilaterally, equal rise and fall of chest, no work of breathing noted  CV: RRR, no MGR, pulses 2+, equal.  Abd:  Soft, NT, ND, + BS, no HSM  EXT:  no clubbing, no cyanosis, no edema  Neuro:  A&Ox3, CN grossly intact, no focal deficits.  Skin: No rashes or lesions noted      Result Review    Result Review:  I have personally reviewed the results from the time of this admission to 10/1/2021 08:20 EDT and agree with these findings:  [x]  Laboratory  []  Microbiology  [x]  Radiology  []  EKG/Telemetry   []  Cardiology/Vascular   []  Pathology  []  Old records  []  Other:  Most notable findings include: Chest x-ray with resolution of pneumothorax; improvement in subcu emphysema    Assessment/Plan   Assessment / Plan     Active Hospital Problems:  Active Hospital Problems    Diagnosis    • Pneumothorax          Impression:  Secondary left pneumothorax secondary to ruptured bleb  Acute hypoxic  respiratory failure secondary to above  Hx of bullous emphysema  Pulmonary fibrosis  Severe emphysema without exacerbation  Tobacco abuse  History of amphetamine use     Plan:  CT chest personally reviewed.  Has severe bilateral bullous emphysema at a very young age.   I would try to get the records from the outside hospital that it is thoracic surgery back in 2017  Chest x-ray reviewed this a.m.  No pneumothorax noted.  Chest tube clamped  Well repeat chest x-ray around noon.  If without pneumothorax will remove chest tube currently on room air  Alpha-1 Antitrypsin and phenotype ordered.  HIV negative.  Continue nebulizers.  Encourage IS.  Encourage up to chair  Repeat chest x-ray was personally reviewed showing no pneumothorax after clamping for 4 hours.  Chest tube was removed under sterile conditions.     DVT prophylaxis:  Medical DVT prophylaxis orders are present.    CODE STATUS:   Level Of Support Discussed With: Patient  Code Status: CPR  Medical Interventions (Level of Support Prior to Arrest): Full    Patient can go home.  See us in the office in 1 to 2 weeks    Labs, radiology, microbiology and provider notes were personally reviewed  Patient's case was discussed with the primary service as well as the bedside RN    I will sign off.  Please call with questions.    Electronically signed by SAI Frias, 10/01/21, 10:54 AM EDT.  Electronically signed by Peter Julio MD, 10/01/21, 2:49 PM EDT.

## 2021-10-02 NOTE — OUTREACH NOTE
Prep Survey      Responses   [facility] facility patient discharged from?  [facility]   Is LACE score < 7 ?  Yes   Emergency Room discharge w/ pulse ox?  No   Eligibility  Not Eligible   What are the reasons patient is not eligible?  Other   Discharge diagnosis  Pneumothorax   Does the patient have one of the following disease processes/diagnoses(primary or secondary)?  Other   Does the patient have Home health ordered?  No   Is there a DME ordered?  No   Prep survey completed?  Yes          Nenita Thomason RN

## 2021-10-03 LAB
BACTERIA SPEC AEROBE CULT: NORMAL
BACTERIA SPEC AEROBE CULT: NORMAL
QT INTERVAL: 315 MS

## 2021-10-03 NOTE — DISCHARGE SUMMARY
[facility]         HOSPITALIST  DISCHARGE SUMMARY    Patient Name: Yogesh Vicente  : 1975  MRN: [id number]    Date of Admission: 2021  Date of Discharge:  10/01/2021  Primary Care Physician: Martin Friedman MD    Consults     Date and Time Order Name Status Description    2021  6:32 PM Inpatient Hospitalist Consult Completed     2021  6:25 PM Inpatient Radiology Consult Completed     2021  6:12 PM Inpatient Pulmonology Consult Completed           Active and Resolved Hospital Problems:  Active Hospital Problems    Diagnosis POA   • Pneumothorax [J93.9] Yes   Severe emphysema  Tobacco abuse  Depression/ anxiety  History of amphetamines   Resolved Hospital Problems   No resolved problems to display.       Hospital Course     Hospital Course:  46 year old male who presents with sudden sob that started at 2 PM. His chest xray shows:  Left pneumothorax.  Chest CT shows:  Pneumothorax is evident on the left, with tethering of the lung to the chest wall by scarring producing an atypical pattern of collapse.  A component of the pneumothorax in the upper and mid hemithorax anteriorly measures 12 cm x 6 cm in greatest transverse and AP dimension.  Similar-sized collections are seen in the inferior left hemithorax.  The radiologist would estimate that at least 50 percent of the left hemithorax is occupied by pneumothorax.    Patient was seen in consult by Pulmonology and was managed with chest tube.   Patient did well from this standpoint.  His lungs reexpanded and the chest tube was able to be removed.      Day of Discharge     Vital Signs:    Reviewed and are stable  Physical Exam:               Constitutional: Awake, alert, no acute distress.                          Respiratory: Good air movement bilaterally.  Left chest tube in place. Crepitance noted over the left chest wall                          Cardiovascular: RRR, no murmurs, rubs, or gallops, palpable pedal pulses  bilaterally              Gastrointestinal: Positive bowel sounds, soft, nontender, nondistended              Musculoskeletal: No bilateral ankle edema, no clubbing or cyanosis to extremities              Psychiatric: Appropriate affect, cooperative              Neurologic: Oriented x 3, moving all extremities focal deficits appreciated, Cranial Nerves grossly intact to confrontation, speech clear              Skin: No rashes     Discharge Details        Discharge Medications      New Medications      Instructions Start Date   albuterol sulfate  (90 Base) MCG/ACT inhaler  Commonly known as: PROVENTIL HFA;VENTOLIN HFA;PROAIR HFA   2 puffs, Inhalation, Every 4 Hours PRN             No Known Allergies    Discharge Disposition:  Home or Self Care    Diet:  Regular    Discharge Activity:   Activity Instructions     Other Activity Instructions      Activity Instructions: As tolerated          CODE STATUS:  Code Status and Medical Interventions:   Ordered at: 09/28/21 5545     Level Of Support Discussed With:    Patient     Code Status:    CPR     Medical Interventions (Level of Support Prior to Arrest):    Full         No future appointments.    Additional Instructions for the Follow-ups that You Need to Schedule     Call MD With Problems / Concerns   As directed      Instructions: Shortness of breath, fevers or new symptoms concerning to the patient    Order Comments: Instructions: Shortness of breath, fevers or new symptoms concerning to the patient          Discharge Follow-up with PCP   As directed       Currently Documented PCP:    Martin Friedman MD    PCP Phone Number:    [phone]     Follow Up Details: hospital follow up 1 week         Discharge Follow-up with Specified Provider: Dr. Julio; 2 Weeks   As directed      To: Dr. Julio    Follow Up: 2 Weeks    Follow Up Details: hosp f/u spontaneous pneumothorax               Pertinent  and/or Most Recent Results     PROCEDURES:   Chest tube  insertion    LAB RESULTS:      Lab 09/30/21 0435 09/29/21  0537 09/28/21 1859 09/28/21  1501 09/28/21  1454   WBC 6.95 7.78 10.87*  --  7.38   HEMOGLOBIN 13.7 14.5 15.2  --  15.7   HEMATOCRIT 40.6 42.2 45.2  --  47.6   PLATELETS 230 219 230  --  254   NEUTROS ABS 4.70 6.38 10.28*  --  4.61   IMMATURE GRANS (ABS) 0.02 0.02 0.03  --  0.02   LYMPHS ABS 1.61 0.83 0.43*  --  2.01   MONOS ABS 0.51 0.54 0.11  --  0.60   EOS ABS 0.09 0.00 0.00  --  0.10   MCV 91.4 90.6 92.4  --  94.1   LACTATE  --   --  1.3 5.4*  --    PROTIME 10.4 10.7 10.9  --   --          Lab 09/30/21  0435 09/29/21  0537 09/28/21 1859 09/28/21  1454   SODIUM 140 138 139 140   POTASSIUM 4.2 4.6 4.8 4.1   CHLORIDE 108* 107 105 102   CO2 24.8 23.0 25.1 22.9   ANION GAP 7.2 8.0 8.9 15.1*   BUN 17 14 15 15   CREATININE 1.12 0.96 1.02 1.14   GLUCOSE 94 174* 146* 180*   CALCIUM 8.7 9.0 9.4 9.3   MAGNESIUM 2.0 2.0 2.0  --    PHOSPHORUS 2.9 3.2 2.1*  --          Lab 09/28/21  1454   TOTAL PROTEIN 6.8   ALBUMIN 4.60   GLOBULIN 2.2   ALT (SGPT) 27   AST (SGOT) 32   BILIRUBIN 0.4   ALK PHOS 83         Lab 09/30/21 0435 09/29/21 0537 09/28/21 1859 09/28/21  1454   PROBNP  --   --   --  26.4   TROPONIN T  --   --   --  <0.010   PROTIME 10.4 10.7 10.9  --    INR 0.93* 0.97* 0.99*  --                  Brief Urine Lab Results     None        Microbiology Results (last 10 days)     Procedure Component Value - Date/Time    Blood Culture - Blood, Arm, Right [[id number]] Collected: 09/28/21 1501    Lab Status: Final result Specimen: Blood from Arm, Right Updated: 10/03/21 1516     Blood Culture No growth at 5 days    Blood Culture - Blood, Arm, Left [[id number]] Collected: 09/28/21 1501    Lab Status: Final result Specimen: Blood from Arm, Left Updated: 10/03/21 1516     Blood Culture No growth at 5 days            Labs Pending at Discharge:  Pending Labs     Order Current Status    Alpha - 1 - Antitrypsin Deficiency In process    Alpha - 1 - Antitrypsin Phenotype In  process            Time spent on Discharge including face to face service: 30 minutes    Electronically signed by Mary Carter MD, 10/03/21, 5:42 PM EDT.

## 2021-10-04 ENCOUNTER — HOSPITAL ENCOUNTER (EMERGENCY)
Facility: HOSPITAL | Age: 46
Discharge: HOME | End: 2021-10-04
Attending: EMERGENCY MEDICINE | Admitting: EMERGENCY MEDICINE

## 2021-10-04 ENCOUNTER — APPOINTMENT (OUTPATIENT)
Dept: GENERAL RADIOLOGY | Facility: HOSPITAL | Age: 46
End: 2021-10-04

## 2021-10-04 VITALS
BODY MASS INDEX: 21.58 KG/M2 | HEIGHT: 68 IN | TEMPERATURE: 98.3 F | SYSTOLIC BLOOD PRESSURE: 123 MMHG | RESPIRATION RATE: 22 BRPM | OXYGEN SATURATION: 94 % | DIASTOLIC BLOOD PRESSURE: 91 MMHG | WEIGHT: 142.42 LBS | HEART RATE: 84 BPM

## 2021-10-04 DIAGNOSIS — R06.02 SHORTNESS OF BREATH: Primary | ICD-10-CM

## 2021-10-04 DIAGNOSIS — J44.1 COPD EXACERBATION (HCC): ICD-10-CM

## 2021-10-04 DIAGNOSIS — J93.9 PNEUMOTHORAX ON LEFT: ICD-10-CM

## 2021-10-04 LAB
A1AT PHENOTYP SERPL IFE: NORMAL
A1AT SERPL-MCNC: 159 MG/DL (ref 101–187)
ALBUMIN SERPL-MCNC: 4.1 G/DL (ref 3.5–5.2)
ALBUMIN/GLOB SERPL: 2 G/DL
ALP SERPL-CCNC: 61 U/L (ref 39–117)
ALT SERPL W P-5'-P-CCNC: 20 U/L (ref 1–41)
ANION GAP SERPL CALCULATED.3IONS-SCNC: 10.5 MMOL/L (ref 5–15)
AST SERPL-CCNC: 18 U/L (ref 1–40)
BASOPHILS # BLD AUTO: 0.03 10*3/MM3 (ref 0–0.2)
BASOPHILS NFR BLD AUTO: 0.3 % (ref 0–1.5)
BILIRUB SERPL-MCNC: 0.4 MG/DL (ref 0–1.2)
BUN SERPL-MCNC: 17 MG/DL (ref 6–20)
BUN/CREAT SERPL: 16.5 (ref 7–25)
CALCIUM SPEC-SCNC: 8.7 MG/DL (ref 8.6–10.5)
CHLORIDE SERPL-SCNC: 108 MMOL/L (ref 98–107)
CO2 SERPL-SCNC: 23.5 MMOL/L (ref 22–29)
CREAT SERPL-MCNC: 1.03 MG/DL (ref 0.76–1.27)
D-LACTATE SERPL-SCNC: 1.4 MMOL/L (ref 0.5–2)
DEPRECATED RDW RBC AUTO: 43.1 FL (ref 37–54)
EOSINOPHIL # BLD AUTO: 0.13 10*3/MM3 (ref 0–0.4)
EOSINOPHIL NFR BLD AUTO: 1.2 % (ref 0.3–6.2)
ERYTHROCYTE [DISTWIDTH] IN BLOOD BY AUTOMATED COUNT: 12.8 % (ref 12.3–15.4)
GFR SERPL CREATININE-BSD FRML MDRD: 77 ML/MIN/1.73
GLOBULIN UR ELPH-MCNC: 2.1 GM/DL
GLUCOSE SERPL-MCNC: 107 MG/DL (ref 65–99)
HCT VFR BLD AUTO: 42.4 % (ref 37.5–51)
HGB BLD-MCNC: 14.5 G/DL (ref 13–17.7)
HOLD SPECIMEN: NORMAL
HOLD SPECIMEN: NORMAL
IMM GRANULOCYTES # BLD AUTO: 0.04 10*3/MM3 (ref 0–0.05)
IMM GRANULOCYTES NFR BLD AUTO: 0.4 % (ref 0–0.5)
LYMPHOCYTES # BLD AUTO: 1.22 10*3/MM3 (ref 0.7–3.1)
LYMPHOCYTES NFR BLD AUTO: 11.7 % (ref 19.6–45.3)
MCH RBC QN AUTO: 31.7 PG (ref 26.6–33)
MCHC RBC AUTO-ENTMCNC: 34.2 G/DL (ref 31.5–35.7)
MCV RBC AUTO: 92.6 FL (ref 79–97)
MONOCYTES # BLD AUTO: 0.66 10*3/MM3 (ref 0.1–0.9)
MONOCYTES NFR BLD AUTO: 6.3 % (ref 5–12)
NEUTROPHILS NFR BLD AUTO: 8.39 10*3/MM3 (ref 1.7–7)
NEUTROPHILS NFR BLD AUTO: 80.1 % (ref 42.7–76)
NRBC BLD AUTO-RTO: 0 /100 WBC (ref 0–0.2)
NT-PROBNP SERPL-MCNC: 38.6 PG/ML (ref 0–450)
PLATELET # BLD AUTO: 226 10*3/MM3 (ref 140–450)
PMV BLD AUTO: 10.3 FL (ref 6–12)
POTASSIUM SERPL-SCNC: 4.1 MMOL/L (ref 3.5–5.2)
PROT SERPL-MCNC: 6.2 G/DL (ref 6–8.5)
RBC # BLD AUTO: 4.58 10*6/MM3 (ref 4.14–5.8)
SODIUM SERPL-SCNC: 142 MMOL/L (ref 136–145)
TROPONIN T SERPL-MCNC: <0.01 NG/ML (ref 0–0.03)
WBC # BLD AUTO: 10.47 10*3/MM3 (ref 3.4–10.8)
WHOLE BLOOD HOLD SPECIMEN: NORMAL
WHOLE BLOOD HOLD SPECIMEN: NORMAL

## 2021-10-04 PROCEDURE — 93005 ELECTROCARDIOGRAM TRACING: CPT | Performed by: EMERGENCY MEDICINE

## 2021-10-04 PROCEDURE — 84484 ASSAY OF TROPONIN QUANT: CPT | Performed by: EMERGENCY MEDICINE

## 2021-10-04 PROCEDURE — 71045 X-RAY EXAM CHEST 1 VIEW: CPT

## 2021-10-04 PROCEDURE — 83880 ASSAY OF NATRIURETIC PEPTIDE: CPT | Performed by: EMERGENCY MEDICINE

## 2021-10-04 PROCEDURE — 80053 COMPREHEN METABOLIC PANEL: CPT | Performed by: EMERGENCY MEDICINE

## 2021-10-04 PROCEDURE — 87040 BLOOD CULTURE FOR BACTERIA: CPT | Performed by: EMERGENCY MEDICINE

## 2021-10-04 PROCEDURE — 99284 EMERGENCY DEPT VISIT MOD MDM: CPT

## 2021-10-04 PROCEDURE — 85025 COMPLETE CBC W/AUTO DIFF WBC: CPT | Performed by: EMERGENCY MEDICINE

## 2021-10-04 PROCEDURE — 83605 ASSAY OF LACTIC ACID: CPT | Performed by: EMERGENCY MEDICINE

## 2021-10-04 RX ORDER — SODIUM CHLORIDE 0.9 % (FLUSH) 0.9 %
10 SYRINGE (ML) INJECTION AS NEEDED
Status: DISCONTINUED | OUTPATIENT
Start: 2021-10-04 | End: 2021-10-04 | Stop reason: HOSPADM

## 2021-10-04 NOTE — ED PROVIDER NOTES
Time: 2:07 PM EDT  Arrived by: EMS  Chief Complaint:   Chief Complaint   Patient presents with   • Shortness of Breath     History provided by: Patient  History is limited by: N/A     History of Present Illness:    Yogesh Vicente is a 46 y.o. male who presents to the emergency department today with complaints of shortness of breath. The patient reports that he does have a history of COPD and developed increased shortness of breath last week. Per his records, the patient was treated for pneumothorax on 9/28/2021.    He did seem to improve for a time. However, this morning, the patient states that his shortness of breath worsened once again. He does also have some mild pain in his left chest wall, though he denies any fever. He denies any other sources of pain at this time.    He denies any recent pneumonia or a history of heart conditions. He is a current smoker and denies alcohol use, though he does have a history of drug abuse (amphetamines and marijuana). Per triage, the patient stopped using these drugs two years ago. The patient denies receiving his COVID-19 vaccines. There are no other acute complaints at this time.        History provided by:  Patient   used: No    Shortness of Breath  Severity:  Moderate  Onset quality:  Gradual  Duration:  1 week (Worse this morning)  Timing:  Constant  Progression:  Waxing and waning  Chronicity:  Chronic  Context comment:  Patient has a history of COPD and was recently treated for penumothorax. His shortness of breath worsened this morning.  Relieved by:  None tried  Worsened by:  Nothing  Ineffective treatments:  None tried  Associated symptoms: chest pain (mild left chest wall pain)    Associated symptoms: no cough, no fever, no neck pain, no rash and no vomiting    Risk factors comment:  No recent pneumonia or heart conditions.      Similar Symptoms Previously: Yes.  Recently seen: Patient was admitted on 9/28/2021 with pneumothorax.      Patient Care  "Team  Primary Care Provider: Martin Friedman MD    Past Medical History:     No Known Allergies  Past Medical History:   Diagnosis Date   • COPD (chronic obstructive pulmonary disease) (CMS/HCC)    • Pneumothorax      Past Surgical History:   Procedure Laterality Date   • CHEST TUBE INSERTION       No family history on file.    Home Medications:  Prior to Admission medications    Medication Sig Start Date End Date Taking? Authorizing Provider   albuterol sulfate  (90 Base) MCG/ACT inhaler Inhale 2 puffs Every 4 (Four) Hours As Needed for Wheezing or Shortness of Air. 10/1/21   Mary Carter MD        Social History:   Social History     Tobacco Use   • Smoking status: Current Every Day Smoker     Packs/day: 1.00     Years: 33.00     Pack years: 33.00     Types: Cigarettes   • Smokeless tobacco: Never Used   Vaping Use   • Vaping Use: Never assessed   Substance Use Topics   • Alcohol use: Not Currently   • Drug use: Yes     Types: Amphetamines, Marijuana     Comment: stopped using 2 years     Recent travel: not applicable     Review of Systems:  Review of Systems   Constitutional: Negative for chills and fever.   HENT: Negative for nosebleeds.    Eyes: Negative for redness.   Respiratory: Positive for shortness of breath. Negative for cough.    Cardiovascular: Positive for chest pain (mild left chest wall pain).   Gastrointestinal: Negative for diarrhea and vomiting.   Genitourinary: Negative for dysuria and frequency.   Musculoskeletal: Negative for back pain and neck pain.   Skin: Negative for rash.   Neurological: Negative for seizures.   All other systems reviewed and are negative.       Physical Exam:  /87   Pulse 98   Temp 98.3 °F (36.8 °C) (Oral)   Resp 22   Ht 172.7 cm (68\")   Wt 64.6 kg (142 lb 6.7 oz)   SpO2 94%   BMI 21.65 kg/m²     Physical Exam  Vitals and nursing note reviewed.   Constitutional:       General: He is not in acute distress.  HENT:      Head: " Normocephalic and atraumatic.      Nose: Nose normal.      Mouth/Throat:      Mouth: Mucous membranes are moist.   Eyes:      General: No scleral icterus.  Cardiovascular:      Rate and Rhythm: Normal rate and regular rhythm.      Heart sounds: Normal heart sounds. No murmur heard.     Pulmonary:      Effort: No respiratory distress.      Breath sounds: Decreased breath sounds (Diminished breath sounds bilaterally) present.   Chest:      Comments: Crepitance at left axillary line.  Abdominal:      Palpations: Abdomen is soft.      Tenderness: There is no abdominal tenderness.   Musculoskeletal:         General: No tenderness. Normal range of motion.      Cervical back: Normal range of motion and neck supple. No tenderness.      Right lower leg: No edema.      Left lower leg: No edema.   Skin:     General: Skin is warm and dry.   Neurological:      Mental Status: He is alert. Mental status is at baseline.   Psychiatric:         Behavior: Behavior normal.                Medications in the Emergency Department:  Medications   sodium chloride 0.9 % flush 10 mL (has no administration in time range)        Labs  Labs Reviewed   COMPREHENSIVE METABOLIC PANEL - Abnormal; Notable for the following components:       Result Value    Glucose 107 (*)     Chloride 108 (*)     All other components within normal limits    Narrative:     GFR Normal >60  Chronic Kidney Disease <60  Kidney Failure <15     CBC WITH AUTO DIFFERENTIAL - Abnormal; Notable for the following components:    Neutrophil % 80.1 (*)     Lymphocyte % 11.7 (*)     Neutrophils, Absolute 8.39 (*)     All other components within normal limits   BNP (IN-HOUSE) - Normal    Narrative:     Among patients with dyspnea, NT-proBNP is highly sensitive for the detection of acute congestive heart failure. In addition NT-proBNP of <300 pg/ml effectively rules out acute congestive heart failure with 99% negative predictive value.    Results may be falsely decreased if patient  taking Biotin.     TROPONIN (IN-HOUSE) - Normal    Narrative:     Troponin T Reference Range:  <= 0.03 ng/mL-   Negative for AMI  >0.03 ng/mL-     Abnormal for myocardial necrosis.  Clinicians would have to utilize clinical acumen, EKG, Troponin and serial changes to determine if it is an Acute Myocardial Infarction or myocardial injury due to an underlying chronic condition.       Results may be falsely decreased if patient taking Biotin.     LACTIC ACID, PLASMA - Normal   BLOOD CULTURE   BLOOD CULTURE   RAINBOW DRAW    Narrative:     The following orders were created for panel order [place] Draw.  Procedure                               Abnormality         Status                     ---------                               -----------         ------                     Green Top (Gel)[[id number]]                                  Final result               Lavender Top[[id number]]                                     Final result               Gold Top - SST[[id number]]                                   Final result               Light Blue Top[[id number]]                                   Final result                 Please view results for these tests on the individual orders.   CBC AND DIFFERENTIAL    Narrative:     The following orders were created for panel order CBC & Differential.  Procedure                               Abnormality         Status                     ---------                               -----------         ------                     CBC Auto Differential[[id number]]        Abnormal            Final result                 Please view results for these tests on the individual orders.   GREEN TOP   LAVENDER TOP   GOLD TOP - SST   LIGHT BLUE TOP        Imaging:  XR Chest 1 View    Result Date: 10/4/2021  PROCEDURE: XR CHEST 1 VW  COMPARISON: 10/01/2021  INDICATIONS: SOA Triage Protocol/shortness of breath  FINDINGS:  Left apical pigtail thoracostomy tube has been removed.  There is extensive upper  lung predominant pulmonary emphysematous change and large bullae again noted.  There are findings suggesting a small left apical pneumothorax extending approximately 9 millimeters from the apex.  There is a small amount of subcutaneous emphysema noted over the left chest wall, decreased as compared to prior.  There is extensive linear consolidation in the mid and lower lungs bilaterally suggesting compressive atelectasis and/or scarring.  Heart size and mediastinal contour appear stable.  CONCLUSION:   1. Small left apical pneumothorax, measuring approximately 9 millimeters from the apex.  Interval removal of left-sided thoracostomy tube.  Decreasing left sided subcutaneous emphysema.  2. Severe pulmonary emphysematous change with large apically bullae and bilateral mid and lower lung scarring or compressive atelectasis.        JARET AGUIRRE MD       Electronically Signed and Approved By: JARET AGUIRRE MD on 10/04/2021 at 14:38               Procedures:  Procedures    Progress  ED Course as of Oct 04 2138   Mon Oct 04, 2021   1738 At bedside reevaluating the patient and updating on lab and imaging results. All questions addressed. Patient is agreeable with the plan for discharge and follow up.    [RF]      ED Course User Index  [RF] Rukhsana Dale                            Medical Decision Making:  MDM  Number of Diagnoses or Management Options  COPD exacerbation (HCC)  Pneumothorax on left  Shortness of breath  Diagnosis management comments: Patient presents with shortness of air.  Old records reviewed he said multiple previous visits to radiology to follow is known COPD.  Differential considerations include but not limited to pneumonia versus pneumothorax.  Chest radiograph is read by radiology today and reviewed by me demonstrates a small pneumothorax with COPD.  White counts normal lactate normal chemistries unremarkable.  The pneumothorax is small and appropriate for outpatient follow-up.  He is to return for  worsening dyspnea or other concerns.       Amount and/or Complexity of Data Reviewed  Clinical lab tests: reviewed  Tests in the radiology section of CPT®: reviewed  Tests in the medicine section of CPT®: reviewed    Risk of Complications, Morbidity, and/or Mortality  Presenting problems: high  Management options: high    Patient Progress  Patient progress: stable       Final diagnoses:   None        Disposition:  ED Disposition     None          Documentation assistance provided by Rukhsana Dale acting as scribe for Dr. Prasad Lee. Information recorded by the scribe was done at my direction and has been verified and validated by me.        Rukhsana Dale  10/04/21 1444       Rukhsana Dale  10/04/21 6135       Prasad Lee MD  10/04/21 4654

## 2021-10-04 NOTE — DISCHARGE INSTRUCTIONS
Patient was seen in the emergency room today for a collapsed lung with associated shortness of air.  The collpased lung was substantiated by chest x-ray

## 2021-10-05 ENCOUNTER — APPOINTMENT (OUTPATIENT)
Dept: GENERAL RADIOLOGY | Facility: HOSPITAL | Age: 46
End: 2021-10-05

## 2021-10-05 ENCOUNTER — HOSPITAL ENCOUNTER (INPATIENT)
Facility: HOSPITAL | Age: 46
LOS: 2 days | Discharge: HOME | End: 2021-10-07
Attending: EMERGENCY MEDICINE | Admitting: INTERNAL MEDICINE

## 2021-10-05 ENCOUNTER — APPOINTMENT (OUTPATIENT)
Dept: CT IMAGING | Facility: HOSPITAL | Age: 46
End: 2021-10-05

## 2021-10-05 DIAGNOSIS — J93.9 PNEUMOTHORAX, UNSPECIFIED TYPE: ICD-10-CM

## 2021-10-05 DIAGNOSIS — J43.9 BULLOUS EMPHYSEMA (HCC): Primary | ICD-10-CM

## 2021-10-05 LAB
ALBUMIN SERPL-MCNC: 3.9 G/DL (ref 3.5–5.2)
ALBUMIN/GLOB SERPL: 2.1 G/DL
ALP SERPL-CCNC: 59 U/L (ref 39–117)
ALT SERPL W P-5'-P-CCNC: 21 U/L (ref 1–41)
ANION GAP SERPL CALCULATED.3IONS-SCNC: 12.3 MMOL/L (ref 5–15)
ANION GAP SERPL CALCULATED.3IONS-SCNC: 6.9 MMOL/L (ref 5–15)
AST SERPL-CCNC: 27 U/L (ref 1–40)
BASOPHILS # BLD AUTO: 0.04 10*3/MM3 (ref 0–0.2)
BASOPHILS NFR BLD AUTO: 0.6 % (ref 0–1.5)
BILIRUB SERPL-MCNC: 0.3 MG/DL (ref 0–1.2)
BUN SERPL-MCNC: 19 MG/DL (ref 6–20)
BUN SERPL-MCNC: 19 MG/DL (ref 6–20)
BUN/CREAT SERPL: 15.8 (ref 7–25)
BUN/CREAT SERPL: 18.1 (ref 7–25)
CALCIUM SPEC-SCNC: 8.5 MG/DL (ref 8.6–10.5)
CALCIUM SPEC-SCNC: 8.9 MG/DL (ref 8.6–10.5)
CHLORIDE SERPL-SCNC: 105 MMOL/L (ref 98–107)
CHLORIDE SERPL-SCNC: 109 MMOL/L (ref 98–107)
CO2 SERPL-SCNC: 21.7 MMOL/L (ref 22–29)
CO2 SERPL-SCNC: 25.1 MMOL/L (ref 22–29)
CREAT SERPL-MCNC: 1.05 MG/DL (ref 0.76–1.27)
CREAT SERPL-MCNC: 1.2 MG/DL (ref 0.76–1.27)
DEPRECATED RDW RBC AUTO: 43.7 FL (ref 37–54)
EOSINOPHIL # BLD AUTO: 0.21 10*3/MM3 (ref 0–0.4)
EOSINOPHIL NFR BLD AUTO: 3.1 % (ref 0.3–6.2)
ERYTHROCYTE [DISTWIDTH] IN BLOOD BY AUTOMATED COUNT: 12.9 % (ref 12.3–15.4)
GFR SERPL CREATININE-BSD FRML MDRD: 65 ML/MIN/1.73
GFR SERPL CREATININE-BSD FRML MDRD: 76 ML/MIN/1.73
GLOBULIN UR ELPH-MCNC: 1.9 GM/DL
GLUCOSE SERPL-MCNC: 101 MG/DL (ref 65–99)
GLUCOSE SERPL-MCNC: 84 MG/DL (ref 65–99)
HCT VFR BLD AUTO: 42.5 % (ref 37.5–51)
HGB BLD-MCNC: 14.4 G/DL (ref 13–17.7)
HOLD SPECIMEN: NORMAL
HOLD SPECIMEN: NORMAL
IMM GRANULOCYTES # BLD AUTO: 0.02 10*3/MM3 (ref 0–0.05)
IMM GRANULOCYTES NFR BLD AUTO: 0.3 % (ref 0–0.5)
LYMPHOCYTES # BLD AUTO: 1.72 10*3/MM3 (ref 0.7–3.1)
LYMPHOCYTES NFR BLD AUTO: 25.4 % (ref 19.6–45.3)
MCH RBC QN AUTO: 31.3 PG (ref 26.6–33)
MCHC RBC AUTO-ENTMCNC: 33.9 G/DL (ref 31.5–35.7)
MCV RBC AUTO: 92.4 FL (ref 79–97)
MONOCYTES # BLD AUTO: 0.72 10*3/MM3 (ref 0.1–0.9)
MONOCYTES NFR BLD AUTO: 10.6 % (ref 5–12)
NEUTROPHILS NFR BLD AUTO: 4.06 10*3/MM3 (ref 1.7–7)
NEUTROPHILS NFR BLD AUTO: 60 % (ref 42.7–76)
NRBC BLD AUTO-RTO: 0 /100 WBC (ref 0–0.2)
NT-PROBNP SERPL-MCNC: 62.5 PG/ML (ref 0–450)
PLATELET # BLD AUTO: 240 10*3/MM3 (ref 140–450)
PMV BLD AUTO: 11.2 FL (ref 6–12)
POTASSIUM SERPL-SCNC: 4.7 MMOL/L (ref 3.5–5.2)
POTASSIUM SERPL-SCNC: 5.1 MMOL/L (ref 3.5–5.2)
PROT SERPL-MCNC: 5.8 G/DL (ref 6–8.5)
RBC # BLD AUTO: 4.6 10*6/MM3 (ref 4.14–5.8)
SODIUM SERPL-SCNC: 137 MMOL/L (ref 136–145)
SODIUM SERPL-SCNC: 143 MMOL/L (ref 136–145)
TROPONIN T SERPL-MCNC: <0.01 NG/ML (ref 0–0.03)
WBC # BLD AUTO: 6.77 10*3/MM3 (ref 3.4–10.8)
WHOLE BLOOD HOLD SPECIMEN: NORMAL
WHOLE BLOOD HOLD SPECIMEN: NORMAL

## 2021-10-05 PROCEDURE — 94799 UNLISTED PULMONARY SVC/PX: CPT

## 2021-10-05 PROCEDURE — 93005 ELECTROCARDIOGRAM TRACING: CPT | Performed by: EMERGENCY MEDICINE

## 2021-10-05 PROCEDURE — 71250 CT THORAX DX C-: CPT

## 2021-10-05 PROCEDURE — 99223 1ST HOSP IP/OBS HIGH 75: CPT | Performed by: INTERNAL MEDICINE

## 2021-10-05 PROCEDURE — 71045 X-RAY EXAM CHEST 1 VIEW: CPT

## 2021-10-05 PROCEDURE — 85025 COMPLETE CBC W/AUTO DIFF WBC: CPT | Performed by: EMERGENCY MEDICINE

## 2021-10-05 PROCEDURE — 99284 EMERGENCY DEPT VISIT MOD MDM: CPT

## 2021-10-05 PROCEDURE — 83880 ASSAY OF NATRIURETIC PEPTIDE: CPT | Performed by: EMERGENCY MEDICINE

## 2021-10-05 PROCEDURE — 94640 AIRWAY INHALATION TREATMENT: CPT

## 2021-10-05 PROCEDURE — 80053 COMPREHEN METABOLIC PANEL: CPT | Performed by: EMERGENCY MEDICINE

## 2021-10-05 PROCEDURE — 99223 1ST HOSP IP/OBS HIGH 75: CPT | Performed by: GENERAL PRACTICE

## 2021-10-05 PROCEDURE — 84484 ASSAY OF TROPONIN QUANT: CPT | Performed by: EMERGENCY MEDICINE

## 2021-10-05 PROCEDURE — 93010 ELECTROCARDIOGRAM REPORT: CPT | Performed by: INTERNAL MEDICINE

## 2021-10-05 PROCEDURE — 36415 COLL VENOUS BLD VENIPUNCTURE: CPT | Performed by: GENERAL PRACTICE

## 2021-10-05 RX ORDER — SODIUM CHLORIDE 0.9 % (FLUSH) 0.9 %
10 SYRINGE (ML) INJECTION AS NEEDED
Status: DISCONTINUED | OUTPATIENT
Start: 2021-10-05 | End: 2021-10-07 | Stop reason: HOSPADM

## 2021-10-05 RX ORDER — SODIUM CHLORIDE 0.9 % (FLUSH) 0.9 %
10 SYRINGE (ML) INJECTION EVERY 12 HOURS SCHEDULED
Status: DISCONTINUED | OUTPATIENT
Start: 2021-10-05 | End: 2021-10-07 | Stop reason: HOSPADM

## 2021-10-05 RX ORDER — ONDANSETRON 4 MG/1
4 TABLET, FILM COATED ORAL EVERY 6 HOURS PRN
Status: DISCONTINUED | OUTPATIENT
Start: 2021-10-05 | End: 2021-10-07 | Stop reason: HOSPADM

## 2021-10-05 RX ORDER — BISACODYL 5 MG/1
5 TABLET, DELAYED RELEASE ORAL DAILY PRN
Status: DISCONTINUED | OUTPATIENT
Start: 2021-10-05 | End: 2021-10-07 | Stop reason: HOSPADM

## 2021-10-05 RX ORDER — AMOXICILLIN 250 MG
2 CAPSULE ORAL 2 TIMES DAILY
Status: DISCONTINUED | OUTPATIENT
Start: 2021-10-05 | End: 2021-10-07 | Stop reason: HOSPADM

## 2021-10-05 RX ORDER — BISACODYL 10 MG
10 SUPPOSITORY, RECTAL RECTAL DAILY PRN
Status: DISCONTINUED | OUTPATIENT
Start: 2021-10-05 | End: 2021-10-07 | Stop reason: HOSPADM

## 2021-10-05 RX ORDER — ALBUTEROL SULFATE 90 UG/1
2 AEROSOL, METERED RESPIRATORY (INHALATION) EVERY 4 HOURS PRN
Status: DISCONTINUED | OUTPATIENT
Start: 2021-10-05 | End: 2021-10-07 | Stop reason: HOSPADM

## 2021-10-05 RX ORDER — ARFORMOTEROL TARTRATE 15 UG/2ML
15 SOLUTION RESPIRATORY (INHALATION)
Status: DISCONTINUED | OUTPATIENT
Start: 2021-10-05 | End: 2021-10-07 | Stop reason: HOSPADM

## 2021-10-05 RX ORDER — POLYETHYLENE GLYCOL 3350 17 G/17G
17 POWDER, FOR SOLUTION ORAL DAILY PRN
Status: DISCONTINUED | OUTPATIENT
Start: 2021-10-05 | End: 2021-10-07 | Stop reason: HOSPADM

## 2021-10-05 RX ORDER — IPRATROPIUM BROMIDE AND ALBUTEROL SULFATE 2.5; .5 MG/3ML; MG/3ML
3 SOLUTION RESPIRATORY (INHALATION) ONCE
Status: COMPLETED | OUTPATIENT
Start: 2021-10-05 | End: 2021-10-05

## 2021-10-05 RX ORDER — BUDESONIDE 0.5 MG/2ML
0.5 INHALANT ORAL
Status: DISCONTINUED | OUTPATIENT
Start: 2021-10-05 | End: 2021-10-07 | Stop reason: HOSPADM

## 2021-10-05 RX ADMIN — Medication 10 ML: at 10:15

## 2021-10-05 RX ADMIN — BUDESONIDE 0.5 MG: 0.5 INHALANT RESPIRATORY (INHALATION) at 21:15

## 2021-10-05 RX ADMIN — Medication 10 ML: at 20:47

## 2021-10-05 RX ADMIN — ARFORMOTEROL TARTRATE 15 MCG: 15 SOLUTION RESPIRATORY (INHALATION) at 08:41

## 2021-10-05 RX ADMIN — BUDESONIDE 0.5 MG: 0.5 INHALANT RESPIRATORY (INHALATION) at 08:42

## 2021-10-05 RX ADMIN — ARFORMOTEROL TARTRATE 15 MCG: 15 SOLUTION RESPIRATORY (INHALATION) at 21:15

## 2021-10-05 RX ADMIN — IPRATROPIUM BROMIDE AND ALBUTEROL SULFATE 3 ML: .5; 2.5 SOLUTION RESPIRATORY (INHALATION) at 08:41

## 2021-10-05 NOTE — CONSULTS
Pulmonary / Critical Care Consult Note      Patient Name: Yogesh Vicente  : 1975  MRN: [id number]  Primary Care Physician:  Martin Friedman MD  Referring Physician: Rick Hope DO  Date of admission: 10/5/2021    Subjective   Subjective     Reason for Consult/ Chief Complaint: left pneumothorax    HPI:  Yogesh Vicente is a 46 y.o. male with past medical history of severe bullous emphysema, amphetamine use now in remission, and chronic smoking who presented to ED with complaints of sudden onset of shortness of breath and left sided chest pain anteriorly that started yesterday.  He was just in hospital last week for left pneumothorax and had chest tube placed.  The chest tube was successfully removed and the patient was sent home. He reports in 2017 he had similar episode on the right.  He was sent to [facility] in [place] for VATS vs bullectomy but we have been unable to obtain records.  He came in by EMS who performed needle decompression.  In the ED CT chest revealed severe emphysematous bullous changes with persistent subpulmonic left sided pneumothorax which has improved since prior study on 21.  Because of the above our services was consulted for further evaluation and treatment.  Upon exam he is sitting on side of bed on room air.  He states he feels fine and then if he moves too fast he will feel short of breath and have the left sided pain.  He denies any chest pain, cough, hemoptysis, nausea, or vomiting.  He does continue to smoke.      Review of Systems  Constitutional symptoms:  Denied complaints   Ear, nose, throat: Denied complaints  Cardiovascular:  Denied complaints  Respiratory: shortness of breath, pleuritic chest pain, otherwise, denied complaints  Gastrointestinal: Denied complaints  Musculoskeletal: Denied complaints  Genitourinary: Denied complaints  Allergy / Immunology: Denied complaints  Hematologic: Denied complaints  Neurologic: Denied complaints  Skin: Denied  complaints  Endocrine: Denied complaints  Psychiatric: Denied complaints    Personal History     Past Medical History:   Diagnosis Date   • COPD (chronic obstructive pulmonary disease) (HCC)    • Pneumothorax        Past Surgical History:   Procedure Laterality Date   • CHEST TUBE INSERTION       Family History:   Otherwise pertinent FHx was reviewed and not pertinent to current issue. Father,  at 60 with drug and alcohol abuse, lung cancer.  Mother, alive, unknown health history.  Uncle on fathers side has COPD.    Social History:  reports that he has been smoking cigarettes. He has a 33.00 pack-year smoking history. He has never used smokeless tobacco. He reports previous alcohol use. He reports current drug use. Drugs: Amphetamines and Marijuana. Current smoker with 33 pack year history.  History of amphetamine and marijuana use.  Has been clean for 2 years.    Home Medications:  albuterol sulfate HFA    Allergies:  No Known Allergies    Objective    Objective     Vitals:   Temp:  [97.7 °F (36.5 °C)-98.4 °F (36.9 °C)] 97.9 °F (36.6 °C)  Heart Rate:  [] 99  Resp:  [16-22] 16  BP: ()/(59-91) 100/59  Flow (L/min):  [2-3] 2    Physical Exam:  Vital Signs Reviewed   General: Thin male, Awake and Alert, NAD on room air   HEENT:  PERRL, EOMI.  OP, nares clear, no sinus tenderness  Neck:  Supple, no JVD, no thyromegaly  Lymph: no axillary, cervical, supraclavicular lymphadenopathy noted bilaterally  Chest:  poor aeration, barrel chested, diminished on left, tympanic to percussion bilaterally, no work of breathing noted, subcutaneous air noted to anterior left chest wall  CV: RRR, no MGR, pulses 2+, equal  Abd:  Soft, NT, ND, + BS, no HSM  EXT:  no clubbing, no cyanosis, no edema, no joint tenderness  Neuro:  A&Ox3, CN grossly intact, no focal deficits  Skin: No rashes or lesions noted       Result Review    Result Review:  I have personally reviewed the results from the time of this admission to  10/5/2021 14:53 EDT and agree with these findings:  [x]  Laboratory  [x]  Microbiology  [x]  Radiology  []  EKG/Telemetry   []  Cardiology/Vascular   []  Pathology  [x]  Old records  []  Other:  Most notable findings include: WBC 6.77, BNP 62.5, troponin 0.10, Cr 1.05, K+ 4.7    CT chest revealed severe emphysematous bullous changes with persistent subpulmonic left sided pneumothorax which has improved since prior study on 9/28/21.      10/5 0230 CXR--> severe bullous emphysema changes. No definite pneumothorax.    10/5 0900 CXR--> severe bullous emphysema with chronic fibrosis, basilar left sided pneumothorax with very small apical component with stable subcutaneous emphysema.    Assessment/Plan   Assessment / Plan     Active Hospital Problems:  Active Hospital Problems    Diagnosis    • Bullous emphysema (HCC)    • Pneumothorax      Impression:  Recurrent secondary left pneumothorax secondary to ruptured bleb  Severe bullous emphysema  Pulmonary fibrosis  Severe emphysema without exacerbation  Tobacco abuse  Hx of amphetamine use     Plan:  CT chest reviewed and revealed severe emphysematous bullous changes with persistent subpulmonic left sided pneumothorax which has improved since prior study on 9/28/21.    Repeat CXR with severe bullous emphysema with chronic fibrosis, basilar left sided pneumothorax with very small apical component with stable subcutaneous emphysema.  Pneumothorax is stable at this time and no need for chest tube.  We will repeat this afternoon.  Repeat CXR in am.  If at any point pneumothorax enlarges, will need tube thoracostomy.  At that point, we will need to discuss with patient about doing chemical pleurodesis versus VATS with pleurodesis and blebectomy  On room air.  Continue nebulizers.  Encourage IS.  Up to chair as tolerated.  Alpha-1 antitrypsin 159 with phenotype MM.    DVT prophylaxis:  Mechanical DVT prophylaxis orders are present.     Code Status and Medical Interventions:    Ordered at: 10/05/21 1018     Limited Support to NOT Include:    Intubation    Cardioversion/Defibrillation     Level Of Support Discussed With:    Patient     Code Status:    No CPR     Medical Interventions (Level of Support Prior to Arrest):    Limited      Labs, imaging, notes and medications personally reviewed.  Discussed with primary service and bedside RN.    Thank you for involving me in the care of this patient.    Electronically signed by CATHIE Jimenez, 10/05/21, 2:45 PM EDT.  Electronically signed by Peter Julio MD, 10/05/21, 2:54 PM EDT.

## 2021-10-05 NOTE — H&P
[facility] HISTORY AND PHYSICAL  Date: 10/5/2021   Patient Name: Yogesh Vicente  : 1975  MRN: [id number]  Primary Care Physician:  Martin Friedman MD  Date of admission: 10/5/2021    Subjective   Subjective     Chief Complaint: Shortness of breath.    HPI: Yogesh Vicente is a 46 y.o. male who presents with shortness of breath.  Patient was evaluated in the emergency department yesterday for a pneumothorax he was asked to come back for a follow-up chest x-ray however he woke up on the middle of the night severely short of breath he was not having any pain but the shortness of brought breath woke him up from sleep.  Patient called EMS and on route he had a needle decompression on the left chest with 2.5 mg of Versed.  Patient states he still smokes daily he used to do drugs but has not using 2.5 years.   He had Covid and is not -Covid vaccinated.  Reviewing his records he had  pneumothorax for she was evaluated here in the hospital on the beginning of this month during that time he had greater than 50% of the left hemithorax occupied by a pneumothorax, during that time he was managed with a chest tube.      Personal History   ROS     Constitutional: No fever, unintentional weight loss, malaise  HEENT: No vision changes, loss of vision, double vision, difficulty swallowing, painful swallowing, or tinnitus  Respiratory: Positive for shortness of breath.  Positive for some cough no sputum production no fever no chills.  Cardiovascular: No chest pain, palpitations, orthopnea, or paroxysmal nocturnal dyspnea  Gastrointestinal: No nausea, vomiting, diarrhea, hematochezia, bright red blood per rectum, dark tarry stools, bowel incontinence or constipation  Genitourinary: No dysuria, hematuria, bladder incontinence, frequency, nocturia, or hesitancy  Musculoskeletal: No arthritis, joint swelling, deformities or joint pain  Endocrine: No fatigue, heat or cold intolerance  Hematologic: No excessive  bruising or bleeding  Psychiatric: No Anxiety or depression  Neurologic: No Confusion, numbness, or weakness  Skin: No rash or open wounds         PMH  Past Medical History:   Diagnosis Date   • COPD (chronic obstructive pulmonary disease) (HCC)    • Pneumothorax          PSH  Past Surgical History:   Procedure Laterality Date   • CHEST TUBE INSERTION         FH  History reviewed. No pertinent family history.    SOCIAL HISTORY   reports that he has been smoking cigarettes. He has a 33.00 pack-year smoking history. He has never used smokeless tobacco. He reports previous alcohol use. He reports current drug use. Drugs: Amphetamines and Marijuana.     ALLERGIES   No Known Allergies    HOME MEDICINES  Prior to Admission Medications   Prescriptions Last Dose Informant Patient Reported? Taking?   albuterol sulfate  (90 Base) MCG/ACT inhaler Unknown at Unknown time  No No   Sig: Inhale 2 puffs Every 4 (Four) Hours As Needed for Wheezing or Shortness of Air.      Facility-Administered Medications: None                     Objective     Vitals:   Temp:  [98.4 °F (36.9 °C)] 98.4 °F (36.9 °C)  Heart Rate:  [] 85  Resp:  [21] 21  BP: ()/(73-91) 102/73  Flow (L/min):  [3] 3    Physical Exam  Vital signs were reviewed.  General appearance - Patient appears well-developed and well-nourished.    Head - Normocephalic, atraumatic.  Pupils - Equal, round, reactive to light.  Extraocular muscles are intact.  Conjunctive is clear  Oral mucosa - Pink and moist without lesions or erythema.  Uvula is midline.  Neck - Supple.  Trachea was midline.  There is no palpable lymphadenopathy or thyromegaly.  There are no meningeal signs  Lungs -Breath sounds equal bilateral.  No crackles no rails.  Heart -Regular rate and rhythm no murmurs no gallops.  Abdomen -     There is no rebound, guarding, or rigidity.  There are no palpable masses.  There are no pulsatile masses.  Back - Spine is straight and midline.  There is no CVA  tenderness.  Extremities - Intact x4 with full range of motion.  There is no palpable edema.  Neurologic - Cranial nerves II through XII are grossly intact.  Patient has right-sided residual hemiparesis.  Cerebellar function was normal.  Integument - There are no rashes.  There are no petechia or purpura lesions noted.  There are no vesicular lesions noted.           Assessment / Plan     Assessment/Plan:   Left spontaneous pneumothorax  Severe emphysema  History of tobacco use  History of depression and anxiety  History of drug use in remission.  PLAN        DVT prophylaxis:  Labs/images/EKG viewed.  Patient requires admission for management of hypoxia and pneumothorax.  Scheduled and prn nebulizers  Pulmonology consult  Nicotine patch  Smoking cessation education provided.    Mechanical DVT prophylaxis orders are present.    CODE STATUS:       Result Review:    I have personally reviewed the results from the time of this admission to 6/11/2021 06:16 EDT and agree with these findings:  [x]  Laboratory  [x]  Microbiology  [x]  Radiology  [x]  EKG/Telemetry   []  Cardiology/Vascular   []  Pathology  []  Old records  []  Other:    Admission Status:  I believe this patient meets inpatient status.    Electronically signed by Kailee Guerra MD, 10/05/21, 6:57 AM EDT.

## 2021-10-05 NOTE — ED PROVIDER NOTES
Time: 2:17 AM EDT  Arrived by: ambulance  Chief Complaint:   Chief Complaint   Patient presents with   • Shortness of Breath     PATIENT STATES SYMPTOMS STARTED YESTERDAY AFTERNOON. WAS SEEN IN THE ER YESTERDAY AND DIAGNOSED WITH PNEUMOTHORAX. EMS PERFORMED A NEEDLE DECOMPRESSION LEFT  CHEST. GIVEN VERSED 2.5 MG.      History provided by: pt and EMS  History is limited by: N/A     History of Present Illness:  Patient is a 46 y.o. year old male that presents to the emergency department with SOB.    Pt states he was seen in the ED yesterday for a pneumothorax and was told to come back today for a CXR. Pt states he woke up this morning and was more SOB than he was yesterday. Pt denies any known injury or fall.   Pt has hx of COPD and bullous emphysema.       History provided by:  Patient and EMS personnel   used: No    Shortness of Breath  Severity:  Moderate  Onset quality:  Gradual  Timing:  Constant  Progression:  Worsening  Chronicity:  New  Relieved by:  None tried  Worsened by:  Nothing  Ineffective treatments:  None tried  Associated symptoms: no abdominal pain, no chest pain, no ear pain, no fever, no headaches, no neck pain, no rash, no sore throat and no vomiting        Similar Symptoms Previously: none  Recently seen: recently seen in this ED (10/4/2021)    Patient Care Team  Primary Care Provider: Martin Friedman MD    Past Medical History:     No Known Allergies  Past Medical History:   Diagnosis Date   • COPD (chronic obstructive pulmonary disease) (HCC)    • Pneumothorax      Past Surgical History:   Procedure Laterality Date   • CHEST TUBE INSERTION       History reviewed. No pertinent family history.    Home Medications:  Prior to Admission medications    Medication Sig Start Date End Date Taking? Authorizing Provider   albuterol sulfate  (90 Base) MCG/ACT inhaler Inhale 2 puffs Every 4 (Four) Hours As Needed for Wheezing or Shortness of Air. 10/1/21   Emma  "MD Mary        Social History:   Social History     Tobacco Use   • Smoking status: Current Every Day Smoker     Packs/day: 1.00     Years: 33.00     Pack years: 33.00     Types: Cigarettes   • Smokeless tobacco: Never Used   Vaping Use   • Vaping Use: Never used   Substance Use Topics   • Alcohol use: Not Currently   • Drug use: Yes     Types: Amphetamines, Marijuana     Comment: stopped using 2 years     Recent travel: not applicable     Review of Systems:  Review of Systems   Constitutional: Negative for chills and fever.   HENT: Negative for congestion, ear pain, rhinorrhea, sore throat and tinnitus.    Eyes: Negative for photophobia and pain.   Respiratory: Positive for shortness of breath. Negative for choking.    Cardiovascular: Negative for chest pain, palpitations and leg swelling.   Gastrointestinal: Negative for abdominal distention, abdominal pain, diarrhea, nausea and vomiting.   Endocrine: Negative for polydipsia.   Genitourinary: Negative for difficulty urinating, dysuria and hematuria.   Musculoskeletal: Negative for back pain, gait problem and neck pain.   Skin: Negative for rash.   Neurological: Negative for dizziness, seizures, speech difficulty, weakness, light-headedness, numbness and headaches.   Hematological: Negative for adenopathy.   Psychiatric/Behavioral: Negative for agitation, confusion, self-injury and suicidal ideas.        Physical Exam:  /85   Pulse 91   Temp 98.4 °F (36.9 °C) (Oral)   Resp 21   Ht 172.7 cm (68\")   Wt 67.6 kg (149 lb 0.5 oz)   SpO2 97%   BMI 22.66 kg/m²     Physical Exam  Vitals and nursing note reviewed.   Constitutional:       Appearance: Normal appearance. He is well-developed.   HENT:      Head: Normocephalic and atraumatic.      Right Ear: External ear normal.      Left Ear: External ear normal.      Nose: Nose normal.      Mouth/Throat:      Mouth: Mucous membranes are moist.      Pharynx: Oropharynx is clear.   Eyes:      General: Lids are " normal.      Extraocular Movements: Extraocular movements intact.      Conjunctiva/sclera: Conjunctivae normal.      Pupils: Pupils are equal, round, and reactive to light.   Cardiovascular:      Rate and Rhythm: Normal rate and regular rhythm.      Pulses: Normal pulses.      Heart sounds: Normal heart sounds. No murmur heard.     Pulmonary:      Effort: Pulmonary effort is normal.      Breath sounds: Normal breath sounds. No stridor. No wheezing.   Chest:      Chest wall: Crepitus (left lateral chest) present.   Abdominal:      Palpations: Abdomen is soft.      Tenderness: There is no abdominal tenderness.   Musculoskeletal:         General: Normal range of motion.      Cervical back: Full passive range of motion without pain, normal range of motion and neck supple.      Right lower leg: No edema.      Left lower leg: No edema.   Skin:     General: Skin is warm and dry.      Capillary Refill: Capillary refill takes less than 2 seconds.   Neurological:      General: No focal deficit present.      Mental Status: He is alert and oriented to person, place, and time. Mental status is at baseline.      Cranial Nerves: Cranial nerves are intact.      Sensory: Sensation is intact.      Motor: Motor function is intact.      Coordination: Coordination is intact.   Psychiatric:         Attention and Perception: Attention and perception normal.         Mood and Affect: Mood and affect normal.         Speech: Speech normal.         Behavior: Behavior normal. Behavior is cooperative.         Thought Content: Thought content normal.         Cognition and Memory: Cognition and memory normal.                Medications in the Emergency Department:  Medications   sodium chloride 0.9 % flush 10 mL (has no administration in time range)        Labs  Lab Results (last 24 hours)     Procedure Component Value Units Date/Time    CBC & Differential [[id number]]  (Abnormal) Collected: 10/04/21 1400    Specimen: Blood Updated: 10/04/21 2559     Narrative:      The following orders were created for panel order CBC & Differential.  Procedure                               Abnormality         Status                     ---------                               -----------         ------                     CBC Auto Differential[[id number]]        Abnormal            Final result                 Please view results for these tests on the individual orders.    Comprehensive Metabolic Panel [[id number]]  (Abnormal) Collected: 10/04/21 1400    Specimen: Blood Updated: 10/04/21 1433     Glucose 107 mg/dL      BUN 17 mg/dL      Creatinine 1.03 mg/dL      Sodium 142 mmol/L      Potassium 4.1 mmol/L      Chloride 108 mmol/L      CO2 23.5 mmol/L      Calcium 8.7 mg/dL      Total Protein 6.2 g/dL      Albumin 4.10 g/dL      ALT (SGPT) 20 U/L      AST (SGOT) 18 U/L      Alkaline Phosphatase 61 U/L      Total Bilirubin 0.4 mg/dL      eGFR Non African Amer 77 mL/min/1.73      Globulin 2.1 gm/dL      A/G Ratio 2.0 g/dL      BUN/Creatinine Ratio 16.5     Anion Gap 10.5 mmol/L     Narrative:      GFR Normal >60  Chronic Kidney Disease <60  Kidney Failure <15      BNP [[id number]]  (Normal) Collected: 10/04/21 1400    Specimen: Blood Updated: 10/04/21 1431     proBNP 38.6 pg/mL     Narrative:      Among patients with dyspnea, NT-proBNP is highly sensitive for the detection of acute congestive heart failure. In addition NT-proBNP of <300 pg/ml effectively rules out acute congestive heart failure with 99% negative predictive value.    Results may be falsely decreased if patient taking Biotin.      Troponin [[id number]]  (Normal) Collected: 10/04/21 1400    Specimen: Blood Updated: 10/04/21 1432     Troponin T <0.010 ng/mL     Narrative:      Troponin T Reference Range:  <= 0.03 ng/mL-   Negative for AMI  >0.03 ng/mL-     Abnormal for myocardial necrosis.  Clinicians would have to utilize clinical acumen, EKG, Troponin and serial changes to determine if it is an Acute Myocardial  Infarction or myocardial injury due to an underlying chronic condition.       Results may be falsely decreased if patient taking Biotin.      CBC Auto Differential [[id number]]  (Abnormal) Collected: 10/04/21 1400    Specimen: Blood Updated: 10/04/21 1415     WBC 10.47 10*3/mm3      RBC 4.58 10*6/mm3      Hemoglobin 14.5 g/dL      Hematocrit 42.4 %      MCV 92.6 fL      MCH 31.7 pg      MCHC 34.2 g/dL      RDW 12.8 %      RDW-SD 43.1 fl      MPV 10.3 fL      Platelets 226 10*3/mm3      Neutrophil % 80.1 %      Lymphocyte % 11.7 %      Monocyte % 6.3 %      Eosinophil % 1.2 %      Basophil % 0.3 %      Immature Grans % 0.4 %      Neutrophils, Absolute 8.39 10*3/mm3      Lymphocytes, Absolute 1.22 10*3/mm3      Monocytes, Absolute 0.66 10*3/mm3      Eosinophils, Absolute 0.13 10*3/mm3      Basophils, Absolute 0.03 10*3/mm3      Immature Grans, Absolute 0.04 10*3/mm3      nRBC 0.0 /100 WBC     Blood Culture - Blood, Arm, Right [[id number]] Collected: 10/04/21 1400    Specimen: Blood from Arm, Right Updated: 10/04/21 1407    Blood Culture - Blood, Blood, Venous Line [[id number]] Collected: 10/04/21 1400    Specimen: Blood, Venous Line Updated: 10/04/21 1407    Lactic Acid, Plasma [[id number]]  (Normal) Collected: 10/04/21 1400    Specimen: Blood Updated: 10/04/21 1430     Lactate 1.4 mmol/L     CBC & Differential [[id number]]  (Normal) Collected: 10/05/21 0214    Specimen: Blood Updated: 10/05/21 0222    Narrative:      The following orders were created for panel order CBC & Differential.  Procedure                               Abnormality         Status                     ---------                               -----------         ------                     CBC Auto Differential[[id number]]        Normal              Final result                 Please view results for these tests on the individual orders.    Comprehensive Metabolic Panel [[id number]]  (Abnormal) Collected: 10/05/21 0214    Specimen: Blood Updated: 10/05/21  0258     Glucose 84 mg/dL      BUN 19 mg/dL      Creatinine 1.20 mg/dL      Sodium 143 mmol/L      Potassium 5.1 mmol/L      Comment: Specimen hemolyzed.  Results may be affected.        Chloride 109 mmol/L      CO2 21.7 mmol/L      Calcium 8.5 mg/dL      Total Protein 5.8 g/dL      Albumin 3.90 g/dL      ALT (SGPT) 21 U/L      Comment: Specimen hemolyzed.  Results may be affected.        AST (SGOT) 27 U/L      Comment: Specimen hemolyzed.  Results may be affected.        Alkaline Phosphatase 59 U/L      Total Bilirubin 0.3 mg/dL      eGFR Non African Amer 65 mL/min/1.73      Globulin 1.9 gm/dL      A/G Ratio 2.1 g/dL      BUN/Creatinine Ratio 15.8     Anion Gap 12.3 mmol/L     Narrative:      GFR Normal >60  Chronic Kidney Disease <60  Kidney Failure <15      BNP [[id number]]  (Normal) Collected: 10/05/21 0214    Specimen: Blood Updated: 10/05/21 0256     proBNP 62.5 pg/mL     Narrative:      Among patients with dyspnea, NT-proBNP is highly sensitive for the detection of acute congestive heart failure. In addition NT-proBNP of <300 pg/ml effectively rules out acute congestive heart failure with 99% negative predictive value.    Results may be falsely decreased if patient taking Biotin.      Troponin [[id number]]  (Normal) Collected: 10/05/21 0214    Specimen: Blood Updated: 10/05/21 0256     Troponin T <0.010 ng/mL      Comment: Specimen hemolyzed.  Results may be affected.       Narrative:      Troponin T Reference Range:  <= 0.03 ng/mL-   Negative for AMI  >0.03 ng/mL-     Abnormal for myocardial necrosis.  Clinicians would have to utilize clinical acumen, EKG, Troponin and serial changes to determine if it is an Acute Myocardial Infarction or myocardial injury due to an underlying chronic condition.       Results may be falsely decreased if patient taking Biotin.      CBC Auto Differential [[id number]]  (Normal) Collected: 10/05/21 0214    Specimen: Blood Updated: 10/05/21 0222     WBC 6.77 10*3/mm3      RBC 4.60  10*6/mm3      Hemoglobin 14.4 g/dL      Hematocrit 42.5 %      MCV 92.4 fL      MCH 31.3 pg      MCHC 33.9 g/dL      RDW 12.9 %      RDW-SD 43.7 fl      MPV 11.2 fL      Platelets 240 10*3/mm3      Neutrophil % 60.0 %      Lymphocyte % 25.4 %      Monocyte % 10.6 %      Eosinophil % 3.1 %      Basophil % 0.6 %      Immature Grans % 0.3 %      Neutrophils, Absolute 4.06 10*3/mm3      Lymphocytes, Absolute 1.72 10*3/mm3      Monocytes, Absolute 0.72 10*3/mm3      Eosinophils, Absolute 0.21 10*3/mm3      Basophils, Absolute 0.04 10*3/mm3      Immature Grans, Absolute 0.02 10*3/mm3      nRBC 0.0 /100 WBC            Imaging:  CT Chest Without Contrast Diagnostic    Result Date: 10/5/2021  PROCEDURE: CT CHEST WO CONTRAST DIAGNOSTIC  COMPARISONS: [facility], CT, CT CHEST WO CONTRAST, 9/29/2021, 14:16.   [facility], CT, CT CHEST W CONTRAST, 9/28/2021, 17:07.   [facility], CT, ABDOMEN/PELVIS W/ CONTRAST, 11/13/2018, 2:50.   [facility], CT, CHEST W/O CONTRAST, 4/06/2017, 9:42.  INDICATIONS: CHEST PAIN; SHORTNESS OF BREATH; DYSPNEA (FOR 2 MONTHS); THE PATIENT WAS RECENTLY DISCHARGED FROM [facility] FOR TREATMENT OF A LEFT-SIDED PNEUMOTHORAX.  TECHNIQUE: 607 CT images were created without the administration of contrast material.   PROTOCOL:   Standard imaging protocol performed    RADIATION:   DLP: 266.2 mGy*cm   Automated exposure control was utilized to minimize radiation dose.  FINDINGS: Severe emphysematous bullous changes involve the lungs.  There is improved expansion of the [id number] left lung when compared with the prior chest CT study from 9/28/2021.  However, a subpulmonic component of the left-sided pneumothorax persists.  The finding is thought to be new since the 9/29/2021 chest CT study.  No definite right-sided pneumothorax is identified.  The subcutaneous emphysema has decreased since the prior study from 9/29/2021.  The previously seen small bore  left-sided chest tube (in the superior aspect of the left pleural space) has been removed.  There is a small caliber thoracostomy tube in place on the left in the left 2nd anterior intercostal space (such as seen on image 137 of series 203 and adjacent images).  CONCLUSION: There is a subpulmonic left-sided pneumothorax, which is new (or increased) since the 9/29/2021 chest CT study.  It is improved since the prior CT exam from 9/28/2021.  The left-sided chest tube has been removed.  The subcutaneous emphysema has decreased since the prior recent chest CT studies.  No definite right-sided pneumothorax is identified.  Otherwise, little interval change since recent studies is identified.    Pertinent findings were discussed with Dr. Denny (ordering/attending [facility] ED Physician) by phone at approximately 0330 hours on 10/5/2021.   NARCISO CRENSHAW JR, MD       Electronically Signed and Approved By: NARCISO CRENSHAW JR, MD on 10/05/2021 at 3:41             XR Chest 1 View    Result Date: 10/5/2021  PROCEDURE: XR CHEST 1 VW  COMPARISONS: [facility], CT, CT CHEST WO CONTRAST, 9/29/2021, 14:16.   [facility], CR, XR CHEST 1 VW, 10/04/2021, 14:07.   [facility], CR, XR CHEST 1 VW, 10/01/2021, 13:29.   [facility], CR, XR CHEST 2 VW, 10/01/2021, 6:23.   [facility], CR, XR CHEST 1 VW, 10/01/2021, 0:24.   [facility], CR, XR CHEST 1 VW, 9/30/2021, 4:58.   [facility], CR, XR CHEST 1 VW, 9/29/2021, 8:52.   [facility], CR, XR CHEST 1 VW, 9/28/2021, 20:16.   [facility], CR, XR CHEST 1 VW, 9/28/2021, 14:55.   [facility], CR, CHEST AP/PA 1 VIEW, 5/31/2019, 21:48.   [facility], CR, CXR PORTABLE, 4/07/2017, 12:14.   [facility], CR, CXR PORTABLE, 4/07/2017, 6:39.  INDICATIONS: shortness of breath  FINDINGS: Two AP upright portable chest radiographs are provided for review.   Severe bullous emphysematous changes involve the lungs.  Probably no pneumothorax is present.  There is suspected chronic blunting of the right lateral costophrenic sulcus.  The subcutaneous emphysema has decreased since prior recent studies.  No cardiac enlargement.  There may be a left-sided thoracostomy tube in place.  External artifacts obscure detail.  CONCLUSION: Severe bullous emphysematous changes involve the lungs.  No definite pneumothorax is suspected.       NARCISO CRENSHAW JR, MD       Electronically Signed and Approved By: NARCISO CRENSHAW JR, MD on 10/05/2021 at 2:55             XR Chest 1 View    Result Date: 10/4/2021  PROCEDURE: XR CHEST 1 VW  COMPARISON: 10/01/2021  INDICATIONS: SOA Triage Protocol/shortness of breath  FINDINGS:  Left apical pigtail thoracostomy tube has been removed.  There is extensive upper lung predominant pulmonary emphysematous change and large bullae again noted.  There are findings suggesting a small left apical pneumothorax extending approximately 9 millimeters from the apex.  There is a small amount of subcutaneous emphysema noted over the left chest wall, decreased as compared to prior.  There is extensive linear consolidation in the mid and lower lungs bilaterally suggesting compressive atelectasis and/or scarring.  Heart size and mediastinal contour appear stable.  CONCLUSION:   1. Small left apical pneumothorax, measuring approximately 9 millimeters from the apex.  Interval removal of left-sided thoracostomy tube.  Decreasing left sided subcutaneous emphysema.  2. Severe pulmonary emphysematous change with large apically bullae and bilateral mid and lower lung scarring or compressive atelectasis.        JARET AGUIRRE MD       Electronically Signed and Approved By: JARET AGUIRRE MD on 10/04/2021 at 14:38               Procedures:  Procedures    Progress  ED Course as of Oct 05 0511   Tue Oct 05, 2021   0230 Normal sinus rhythm with rate of 96. QRS normal. MD interval normal.  QTc interval is normal. No ST elevation or depression. No T wave abnormalities. No change when compared to miranda. This EKG was interpreted by me.    ECG 12 Lead [LD]      ED Course User Index  [LD] Jluis Denny MD                            Medical Decision Making:  MDM  Number of Diagnoses or Management Options  Diagnosis management comments: Patient is afebrile nontoxic-appearing. Vital signs stable. O2 sat mid 90s on 4 L nasal cannula. Patient was seen here yesterday for a small pneumothorax. The plan was outpatient follow-up with repeat x-ray. Patient woke up with worsening shortness of breath. EMS arrived and gave him Versed and did a needle decompression on the left chest. On exam patient does have crepitus over the left side of his chest. Chest x-ray was obtained and showed severe bullous emphysematous change of the lungs. No definite pneumothorax. A CT was subsequently obtained that showed a subpulmonic left-sided pneumothorax which is new since the chest CT study on 9/29/2021. Since pneumothorax is small we will hold off on placing a chest tube at this time. Labs showed no other significant abnormality. Patient is currently stable. Discussed patient hospitalist and he will be admitted for further care.       Final diagnoses:   Bullous emphysema (HCC)   Pneumothorax, unspecified type        Disposition:  ED Disposition     ED Disposition Condition Comment    Decision to Admit            This medical record created using voice recognition software and may contain unintended errors.    Documentation assistance provided by Elva Spain acting as scribe for Jluis Denny MD. Information recorded by the scribe was done at my direction and has been verified and validated by me.          Elva Spain  10/05/21 0232       Elva Spain  10/05/21 0315       Jluis Denny MD  10/05/21 0509       Jluis Denny MD  10/05/21 0528

## 2021-10-06 ENCOUNTER — APPOINTMENT (OUTPATIENT)
Dept: GENERAL RADIOLOGY | Facility: HOSPITAL | Age: 46
End: 2021-10-06

## 2021-10-06 PROBLEM — Z72.0 TOBACCO ABUSE: Status: ACTIVE | Noted: 2021-10-06

## 2021-10-06 PROBLEM — J84.10 PULMONARY FIBROSIS: Status: ACTIVE | Noted: 2021-10-06

## 2021-10-06 LAB
ANION GAP SERPL CALCULATED.3IONS-SCNC: 10.1 MMOL/L (ref 5–15)
BUN SERPL-MCNC: 18 MG/DL (ref 6–20)
BUN/CREAT SERPL: 15.3 (ref 7–25)
CALCIUM SPEC-SCNC: 8.7 MG/DL (ref 8.6–10.5)
CHLORIDE SERPL-SCNC: 107 MMOL/L (ref 98–107)
CO2 SERPL-SCNC: 23.9 MMOL/L (ref 22–29)
CREAT SERPL-MCNC: 1.18 MG/DL (ref 0.76–1.27)
DEPRECATED RDW RBC AUTO: 42.1 FL (ref 37–54)
ERYTHROCYTE [DISTWIDTH] IN BLOOD BY AUTOMATED COUNT: 12.6 % (ref 12.3–15.4)
GFR SERPL CREATININE-BSD FRML MDRD: 66 ML/MIN/1.73
GLUCOSE SERPL-MCNC: 159 MG/DL (ref 65–99)
HCT VFR BLD AUTO: 40.1 % (ref 37.5–51)
HGB BLD-MCNC: 13.9 G/DL (ref 13–17.7)
MCH RBC QN AUTO: 31.7 PG (ref 26.6–33)
MCHC RBC AUTO-ENTMCNC: 34.7 G/DL (ref 31.5–35.7)
MCV RBC AUTO: 91.6 FL (ref 79–97)
PLATELET # BLD AUTO: 218 10*3/MM3 (ref 140–450)
PMV BLD AUTO: 10.2 FL (ref 6–12)
POTASSIUM SERPL-SCNC: 4.7 MMOL/L (ref 3.5–5.2)
RBC # BLD AUTO: 4.38 10*6/MM3 (ref 4.14–5.8)
SODIUM SERPL-SCNC: 141 MMOL/L (ref 136–145)
WBC # BLD AUTO: 6.43 10*3/MM3 (ref 3.4–10.8)

## 2021-10-06 PROCEDURE — 94799 UNLISTED PULMONARY SVC/PX: CPT

## 2021-10-06 PROCEDURE — 71045 X-RAY EXAM CHEST 1 VIEW: CPT

## 2021-10-06 PROCEDURE — 80048 BASIC METABOLIC PNL TOTAL CA: CPT | Performed by: GENERAL PRACTICE

## 2021-10-06 PROCEDURE — 94760 N-INVAS EAR/PLS OXIMETRY 1: CPT

## 2021-10-06 PROCEDURE — 85027 COMPLETE CBC AUTOMATED: CPT | Performed by: GENERAL PRACTICE

## 2021-10-06 PROCEDURE — 99232 SBSQ HOSP IP/OBS MODERATE 35: CPT | Performed by: INTERNAL MEDICINE

## 2021-10-06 PROCEDURE — 36415 COLL VENOUS BLD VENIPUNCTURE: CPT | Performed by: GENERAL PRACTICE

## 2021-10-06 RX ADMIN — ARFORMOTEROL TARTRATE 15 MCG: 15 SOLUTION RESPIRATORY (INHALATION) at 07:53

## 2021-10-06 RX ADMIN — ARFORMOTEROL TARTRATE 15 MCG: 15 SOLUTION RESPIRATORY (INHALATION) at 20:32

## 2021-10-06 RX ADMIN — Medication 10 ML: at 09:17

## 2021-10-06 RX ADMIN — BUDESONIDE 0.5 MG: 0.5 INHALANT RESPIRATORY (INHALATION) at 07:53

## 2021-10-06 RX ADMIN — Medication 10 ML: at 22:14

## 2021-10-06 RX ADMIN — BUDESONIDE 0.5 MG: 0.5 INHALANT RESPIRATORY (INHALATION) at 20:32

## 2021-10-06 NOTE — PROGRESS NOTES
[facility]   Hospitalist Progress Note  Date: 10/6/2021  Patient Name: Yogesh Vicente  : 1975  MRN: [id number]  Date of admission: 10/5/2021      Subjective   Subjective     Chief Complaint: Follow-up for shortness of breath    Summary: 46-year-old male with history of severe bullous emphysema, amphetamine use now in remission, and chronic smoking with difficulty breathing.  Hospital admission for left pneumothorax status post chest tube placement.  In the ED CT chest revealed severe emphysematous bullous changes with persistent subpulmonic left sided pneumothorax which has improved since prior study on 21.  Pulm on board.    Interval Followup: No issues overnight. on room air.  Resting comfortably in bed. Mild discomfort at the site of needle decompression but no chest pressure.  Otherwise no complaints    Review of Systems    No SOA/PEDRO.      Objective   Objective     Vitals:   Temp:  [97.7 °F (36.5 °C)-98.8 °F (37.1 °C)] 98.2 °F (36.8 °C)  Heart Rate:  [] 90  Resp:  [16-22] 20  BP: ()/(58-83) 97/58  Flow (L/min):  [2] 2  Physical Exam    Constitutional:  male, thin, awake, alert, no acute distress   Eyes: Pupils equal, sclerae anicteric, no conjunctival injection   HENT: NCAT, mucous membranes moist   Neck: Supple, no thyromegaly, trachea midline   Respiratory: Clear to auscultation bilaterally, diminished in the upper lung fields bilaterally, no increased work of breathing   Cardiovascular: Regular tachycardic, no murmurs, no pedal edema   Gastrointestinal: Positive bowel sounds, soft, nontender, nondistended   Musculoskeletal: No gross deformities, no clubbing or cyanosis to extremities   Neurologic: Oriented x 3, Cranial Nerves grossly intact without focal deficit, speech clear   Skin: Warm and dry, no rashes     Result Review    Result Review:  I have personally reviewed the results from the time of this admission to 10/6/2021 08:18 EDT and agree with these findings:  [x]   Laboratory  []  Microbiology  [x]  Radiology blood cultures NGTD  [x]  EKG/Telemetry sinus tachycardia  []  Cardiology/Vascular   []  Pathology  []  Old records  []  Other:    Assessment/Plan   Assessment / Plan     Assessment:  Active Hospital Problems    Diagnosis  POA   • **Pneumothorax on left [J93.9]  Yes   • Pulmonary fibrosis (HCC) [J84.10]  Yes   • Tobacco abuse [Z72.0]  Yes   • Bullous emphysema (HCC) [J43.9]  Yes   •     Plan:    Chest x-ray from this morning shows stable size of left pneumothorax  Patient remains on room air without any respiratory distress  Appreciate pulmonology recommendations  Brovana and Pulmicort nebs twice a day  Emphasized cessation of tobacco and vaping, education provided including different modalities, patient declined NRT at this time  Activity tolerated  Lab holiday  Disposition: Pending pulmonology clearance    Discussed plan with RN, pulmonology    DVT prophylaxis:  Mechanical DVT prophylaxis orders are present.    CODE STATUS:   Limited Support to NOT Include: Intubation; Cardioversion/Defibrillation  Level Of Support Discussed With: Patient  Code Status: No CPR  Medical Interventions (Level of Support Prior to Arrest): Limited        Electronically signed by Rick Hope DO, 10/06/21, 8:18 AM EDT.

## 2021-10-06 NOTE — PLAN OF CARE
Goal Outcome Evaluation:  Plan of Care Reviewed With: patient        Progress: improving  Outcome Summary: NO NEW COMPLAINTS FROM PT, PT WAS ON ROOM AIR MAJORITY OF THE NIGHT.Esperanza Lora RN

## 2021-10-06 NOTE — PROGRESS NOTES
Pulmonary / Critical Care Progress Note      Patient Name: Yogesh Vicente  : 1975  MRN: [id number]  Attending:  Rick Hope DO  Date of admission: 10/5/2021    Subjective   Subjective   Follow-up for left pneumothorax.    No acute events overnight.     This morning,  Sitting up on side of bed  On room air  Minimal subcutaneous air to left anterior chest  Intermittent dyspnea  No further chest pains  Nonproductive cough  Feels better    Review of Systems  General: Denied complaints  Cardiovascular:  Denied complaints  Respiratory: dyspnea (improved), pleuritic chest pain (improved), otherwise denied complaints  Gastrointestinal: Denied complaints    Objective   Objective     Vitals:   Temp:  [97.8 °F (36.6 °C)-98.8 °F (37.1 °C)] 97.8 °F (36.6 °C)  Heart Rate:  [87-98] 89  Resp:  [16-22] 16  BP: ()/(58-94) 118/94  Flow (L/min):  [2] 2    Physical Exam   Vital Signs Reviewed   General: Thin male, Awake and Alert, NAD on room air    HEENT:  PERRL, EOMI.  OP, nares clear  Chest:  poor aeration, barrel chested, diminished on left, tympanic to percussion bilaterally, no work of breathing noted, decreasing subcutaneous air noted to anterior left chest wall  CV: RRR, no MGR, pulses 2+, equal.  Abd:  Soft, NT, ND, + BS, no HSM  EXT:  no clubbing, no cyanosis, no edema  Neuro:  A&Ox3, CN grossly intact, no focal deficits  Skin: No rashes or lesions noted       Result Review    Result Review:  I have personally reviewed the results from the time of this admission to 10/6/2021 15:43 EDT and agree with these findings:  [x]  Laboratory  [x]  Microbiology  [x]  Radiology  []  EKG/Telemetry   []  Cardiology/Vascular   []  Pathology  []  Old records  []  Other:  Most notable findings include: WBC 6.43, K+ 4.7, Cr 1.18    CXR this am no change in left basilar subpulmonic pneumothorax    Assessment/Plan   Assessment / Plan     Active Hospital Problems:  Active Hospital Problems    Diagnosis    • **Pneumothorax on left     • Pulmonary fibrosis (HCC)    • Tobacco abuse    • Bullous emphysema (HCC)      Impression:  Recurrent secondary left pneumothorax secondary to ruptured bleb  Severe bullous emphysema  Pulmonary fibrosis  Severe emphysema without exacerbation  Tobacco abuse  Hx of amphetamine use     Plan:  CXR this am no change in left basilar subpulmonic pneumothorax.  Will repeat with PA/LAT in am.  If at any point pneumothorax enlarges, will need chest tube placement.  At that point, we will need to discuss with patient about doing chemical pleurodesis versus VATS with pleurodesis and blebectomy.  On room air.  Continue nebulizers.  Encourage IS.  Up to chair as tolerated.     If CXR stable in am may discharge patient home.  He will need follow-up with us in the office early next week with a repeat chest x-ray.       DVT prophylaxis:  Mechanical DVT prophylaxis orders are present.    CODE STATUS:   Limited Support to NOT Include: Intubation; Cardioversion/Defibrillation  Level Of Support Discussed With: Patient  Code Status: No CPR  Medical Interventions (Level of Support Prior to Arrest): Limited      Labs, imaging, notes and medications personally reviewed.  Discussed with primary service and bedside RN.    Electronically signed by CATHIE Jimenez, 10/06/21, 3:42 PM EDT.  Electronically signed by Peter Julio MD, 10/06/21, 3:44 PM EDT.

## 2021-10-07 ENCOUNTER — READMISSION MANAGEMENT (OUTPATIENT)
Dept: CALL CENTER | Facility: HOSPITAL | Age: 46
End: 2021-10-07

## 2021-10-07 ENCOUNTER — APPOINTMENT (OUTPATIENT)
Dept: GENERAL RADIOLOGY | Facility: HOSPITAL | Age: 46
End: 2021-10-07

## 2021-10-07 VITALS
DIASTOLIC BLOOD PRESSURE: 95 MMHG | SYSTOLIC BLOOD PRESSURE: 118 MMHG | HEART RATE: 108 BPM | HEIGHT: 68 IN | RESPIRATION RATE: 18 BRPM | OXYGEN SATURATION: 93 % | BODY MASS INDEX: 20.78 KG/M2 | WEIGHT: 137.13 LBS | TEMPERATURE: 97.7 F

## 2021-10-07 LAB
LAB DIRECTOR NAME PROVIDER: NORMAL
SERPINA1 GENE MUT ANL BLD/T: NORMAL
SERPINA1 GENE MUT TESTED BLD/T: NORMAL

## 2021-10-07 PROCEDURE — 94799 UNLISTED PULMONARY SVC/PX: CPT

## 2021-10-07 PROCEDURE — 99232 SBSQ HOSP IP/OBS MODERATE 35: CPT | Performed by: INTERNAL MEDICINE

## 2021-10-07 PROCEDURE — 99239 HOSP IP/OBS DSCHRG MGMT >30: CPT | Performed by: INTERNAL MEDICINE

## 2021-10-07 PROCEDURE — 71046 X-RAY EXAM CHEST 2 VIEWS: CPT

## 2021-10-07 RX ADMIN — Medication 10 ML: at 08:45

## 2021-10-07 RX ADMIN — BUDESONIDE 0.5 MG: 0.5 INHALANT RESPIRATORY (INHALATION) at 07:11

## 2021-10-07 RX ADMIN — ARFORMOTEROL TARTRATE 15 MCG: 15 SOLUTION RESPIRATORY (INHALATION) at 07:11

## 2021-10-07 NOTE — DISCHARGE SUMMARY
[facility]        HOSPITALIST  DISCHARGE SUMMARY    Patient Name: Yogesh Vicente  : 1975  MRN: [id number]    Date of Admission: 10/5/2021  Date of Discharge:  10/07/21  Primary Care Physician: Martin Friedman MD    Consults     Date and Time Order Name Status Description    10/5/2021 10:18 AM Inpatient Pulmonology Consult Completed     10/5/2021  3:48 AM Inpatient Hospitalist Consult Completed     2021  6:32 PM Inpatient Hospitalist Consult Completed     2021  6:25 PM Inpatient Radiology Consult Completed     2021  6:12 PM Inpatient Pulmonology Consult Completed           Active and Resolved Hospital Problems:  Active Hospital Problems    Diagnosis POA   • **Pneumothorax on left [J93.9] Yes   • Pulmonary fibrosis (HCC) [J84.10] Yes   • Tobacco abuse [Z72.0] Yes   • Bullous emphysema (HCC) [J43.9] Yes      Resolved Hospital Problems   No resolved problems to display.       Hospital Course     Hospital Course:  Yogesh Vciente is a 46 y.o. male with severe bullous emphysema, ongoing tobacco use who presented with shortness of breath and left-sided chest pain.  He had a recent hospitalization for left-sided pneumonia thorax requiring chest tube placement.  A CT chest revealed severe emphysematous bullous changes with persistent subpulmonic left-sided pneumothorax which was improved slightly from prior imaging.  Pulmonology was consulted and chest x-rays were repeated.  The size of the pneumothorax remain the same therefore chest tube was not indicated.  He remained on room air and was ambulating without distress.  Cleared by pulmonology with 1 week follow-up and repeat chest x-ray prior to this.  Smoking cessation was discussed in detail however patient declined any assistance.  Discharged home in stable condition.      DISCHARGE Follow Up Recommendations for labs and diagnostics:   PA and lateral chest x-ray in 1 week prior to pulmonology follow-up      Day of Discharge      Vital Signs:  Temp:  [97.7 °F (36.5 °C)-99.2 °F (37.3 °C)] 97.7 °F (36.5 °C)  Heart Rate:  [] 75  Resp:  [16-18] 18  BP: ()/(65-94) 114/83  Physical Exam:   GENERAL: The patient is conversant and nontoxic  HEENT: Nonicteric sclerae, PERRLA, EOMI. Oropharynx clear. Moist mucous membranes.   NECK: Supple, no masses or JVD, trachea midline  CHEST: Chest wall is nontender.  Barrel chested  HEART: Regular rate and rhythm without murmurs.  LUNGS: Equal but diminished air entry throughout, no wheezing, no increased work of breathing  ABDOMEN: Soft, positive bowel sounds, nontender, nondistended  SKIN: No rash, no excessive bruising, open wounds   NEUROLOGIC: Cranial nerves II-XII intact without motor/sensory deficit.        Discharge Details        Discharge Medications      Continue These Medications      Instructions Start Date   albuterol sulfate  (90 Base) MCG/ACT inhaler  Commonly known as: PROVENTIL HFA;VENTOLIN HFA;PROAIR HFA   2 puffs, Inhalation, Every 4 Hours PRN             No Known Allergies    Discharge Disposition:  Home or Self Care    Diet:  Hospital:  Diet Order   Procedures   • Diet Regular       Discharge Activity:   Activity Instructions     Activity as Tolerated            CODE STATUS:  Code Status and Medical Interventions:   Ordered at: 10/05/21 1018     Limited Support to NOT Include:    Intubation    Cardioversion/Defibrillation     Level Of Support Discussed With:    Patient     Code Status:    No CPR     Medical Interventions (Level of Support Prior to Arrest):    Limited       Additional Instructions for the Follow-ups that You Need to Schedule     Discharge Follow-up with PCP   As directed       Currently Documented PCP:    Martin Friedman MD    PCP Phone Number:    [phone]     Follow Up Details: as needed         Discharge Follow-up with Specified Provider: Dr Julio; 1 Week   As directed      To: Dr Julio    Follow Up: 1 Week         XR chest pa and  lateral   Oct 14, 2021      Exam reason: PTX    Does this patient have a diabetic monitoring/medication delivering device on?: No    Release to patient: Immediate               Pertinent  and/or Most Recent Results     PROCEDURES:   NONE    IMAGING:  XR Chest 2 View    Result Date: 10/1/2021  PROCEDURE: XR CHEST 2 VW  COMPARISON: [facility], CR, XR CHEST 1 VW, 9/30/2021, 4:58.  [facility], CR, CHEST PA/AP & LAT 2V, 8/08/2010, 5:48.  INDICATIONS: chest tube water sealed  FINDINGS:  The lungs are adequately expanded.  Severe emphysematous changes are present.  A left-sided chest tube is present.  No evidence of pneumothorax.  There is a stable small right-sided pleural effusion.  Bibasilar scarring is present.  Subcutaneous air is seen along the left chest wall, improved as compared to the previous study.  The osseous structures appear intact.  CONCLUSION:  Stable left-sided chest tube.  No pneumothorax.  Interval improvement in subcutaneous air along the left chest wall.  Otherwise, stable exam.      ANALIA ARRIOLA MD       Electronically Signed and Approved By: ANALIA ARRIOLA MD on 10/01/2021 at 7:58             CT Chest Without Contrast Diagnostic    Result Date: 10/5/2021  PROCEDURE: CT CHEST WO CONTRAST DIAGNOSTIC  COMPARISONS: [facility], CT, CT CHEST WO CONTRAST, 9/29/2021, 14:16.   [facility], CT, CT CHEST W CONTRAST, 9/28/2021, 17:07.   [facility], CT, ABDOMEN/PELVIS W/ CONTRAST, 11/13/2018, 2:50.   [facility], CT, CHEST W/O CONTRAST, 4/06/2017, 9:42.  INDICATIONS: CHEST PAIN; SHORTNESS OF BREATH; DYSPNEA (FOR 2 MONTHS); THE PATIENT WAS RECENTLY DISCHARGED FROM [facility] FOR TREATMENT OF A LEFT-SIDED PNEUMOTHORAX.  TECHNIQUE: 607 CT images were created without the administration of contrast material.   PROTOCOL:   Standard imaging protocol performed    RADIATION:   DLP: 266.2 mGy*cm   Automated exposure control was utilized to  minimize radiation dose.  FINDINGS: Severe emphysematous bullous changes involve the lungs.  There is improved expansion of the [id number] left lung when compared with the prior chest CT study from 9/28/2021.  However, a subpulmonic component of the left-sided pneumothorax persists.  The finding is thought to be new since the 9/29/2021 chest CT study.  No definite right-sided pneumothorax is identified.  The subcutaneous emphysema has decreased since the prior study from 9/29/2021.  The previously seen small bore left-sided chest tube (in the superior aspect of the left pleural space) has been removed.  There is a small caliber thoracostomy tube in place on the left in the left 2nd anterior intercostal space (such as seen on image 137 of series 203 and adjacent images).  CONCLUSION: There is a subpulmonic left-sided pneumothorax, which is new (or increased) since the 9/29/2021 chest CT study.  It is improved since the prior CT exam from 9/28/2021.  The left-sided chest tube has been removed.  The subcutaneous emphysema has decreased since the prior recent chest CT studies.  No definite right-sided pneumothorax is identified.  Otherwise, little interval change since recent studies is identified.    Pertinent findings were discussed with Dr. Denny (ordering/attending [facility] ED Physician) by phone at approximately 0330 hours on 10/5/2021.   NARCISO CRENSHAW JR, MD       Electronically Signed and Approved By: NARCISO CRENSHAW JR, MD on 10/05/2021 at 3:41             CT Chest Without Contrast Diagnostic    Result Date: 9/29/2021  PROCEDURE: CT CHEST WO CONTRAST DIAGNOSTIC  COMPARISON: [facility], CT, CT CHEST W CONTRAST DIAGNOSTIC, 9/28/2021, 17:07.  INDICATIONS: re-evaluate pneumotharox and chest tube placement/severe left upper chest wall pain  TECHNIQUE: CT images were created without the administration of contrast material.   PROTOCOL:   Standard imaging protocol performed    RADIATION:   DLP:  376mGy*cm   Automated exposure control was utilized to minimize radiation dose.  FINDINGS:  There has been interval placement of a left-sided chest tube.  The pigtail component of the tube is located in the left upper chest in the left suprahilar region.  The pneumothorax has markedly decreased in size.  The patient does have extensive subcutaneous emphysema throughout the left lateral chest wall.  There are advanced emphysematous changes in both lungs with large bulla, especially on the right side.  There is left lower lobe bandlike atelectasis versus scarring.  There is also bandlike atelectasis in the right lower lobe versus parenchymal scarring.  There are no enlarged mediastinal lymph nodes.  The hilar regions are difficult to evaluate without contrast.  The heart size is normal.  Both the heart and mediastinal structures are midline.  There are no acute findings seen beneath the hemidiaphragms.  There are no suspicious osteolytic or sclerotic lesions within the bony thorax.  CONCLUSION:  1. The left-sided chest tube is in good position with marked reduction in size of the pneumothorax.  2. Interval development of marked subcutaneous emphysema in the left chest wall.  3. Advanced emphysematous changes in both lungs, especially on the right side. 4. Bandlike atelectasis versus parenchymal scarring.      SOCO LEROY MD       Electronically Signed and Approved By: SOCO LEROY MD on 9/29/2021 at 14:56             CT Chest With Contrast Diagnostic    Result Date: 9/28/2021  PROCEDURE: CT CHEST W CONTRAST DIAGNOSTIC  COMPARISON:  [facility], CT, CHEST W/O CONTRAST, 4/06/2017, 9:42.  [facility], CR, XR CHEST 1 VW, 9/28/2021, 14:55.  [facility], CT, ABDOMEN/PELVIS WITH CONTRAST, 11/13/2018, 2:50.  INDICATIONS: shortness of breath, mid to left chest pain  TECHNIQUE: CT images were obtained with non-ionic intravenous contrast material.   PROTOCOL:   Pulmonary embolism imaging  protocol performed    RADIATION:   DLP: 224.9 mGy*cm   Automated exposure control was utilized to minimize radiation dose. CONTRAST: 100 cc Isovue 370 I.V.  FINDINGS:  The pulmonary arteries are well opacified.  No filling defects are seen.  There are no findings of PE.  Lung window images reveal marked bullous emphysema.  Pneumothorax is evident on the left, with tethering of the lung to the chest wall by scarring producing an atypical pattern of collapse.  A component of the pneumothorax in the upper and mid hemithorax anteriorly measures 12 cm x 6 cm in greatest transverse and AP dimension.  Similar-sized collections are seen in the inferior left hemithorax.  I would estimate that at least 50 percent of the left hemithorax is occupied by pneumothorax.  No mediastinal, hilar, or axillary adenopathy is seen.  Mild degenerative spurring is seen in the thoracic spine.  CONTINUED ON NEXT PAGE...    CONCLUSION:  CT scan of the chest with IV contrast demonstrating no findings of PE.  Marked bullous emphysema.  Large left pneumothorax, with tethering of the lung to the chest wall by scarring producing an atypical pattern of collapse.  I discussed findings with Dr. Plata at 1810.     ALY CALDERON MD       Electronically Signed and Approved By: ALY CALDERON MD on 9/28/2021 at 18:12             XR Chest 1 View    Result Date: 10/6/2021  PROCEDURE: XR CHEST 1 VW  COMPARISON: [facility], , XR CHEST 1 VW, 10/05/2021, 14:48.  INDICATIONS: Left-sided pneumothorax.  FINDINGS: 2 AP upright portable views of the chest are provided for review.  The left subpulmonic pneumothorax persists.  Severe bullous emphysematous changes involve the lungs, as before.  There is probably chronic blunting of the right lateral costophrenic sulcus.  The subcutaneous emphysema on the left has decreased since 10/5/2021 at 1448 hours.  No right pneumothorax is seen.  No cardiomediastinal enlargement.  No definite  pneumomediastinum.  CONCLUSION: Probably no significant interval change is seen in the left-sided subpulmonic pneumothorax since 10/5/2021 at 1448 hours.     NARCISO CRENSHAW JR, MD       Electronically Signed and Approved By: NARCISO CRENSHAW JR, MD on 10/06/2021 at 5:00             XR Chest 1 View    Result Date: 10/5/2021  PROCEDURE: XR CHEST 1 VW  COMPARISON: [facility], CR, XR CHEST 1 VW, 10/05/2021, 2:20.  [facility], CT, CT CHEST WO CONTRAST DIAGNOSTIC, 10/05/2021, 3:00.  [facility], CR, XR CHEST 1 VW, 10/05/2021, 8:43.  INDICATIONS: FOLLOW UP LEFT PNEUMOTHORAX  FINDINGS:  Heart size and pulmonary vessels are within normal limits.  There is severe emphysematous change of the lungs.  There is a small left basilar left-sided pneumothorax.  There is subcutaneous emphysema over the superior lateral chest wall.  No right-sided pneumothorax.  There is blunting of both costophrenic angles consistent with pleural scarring.  There is minimal linear atelectasis or scarring in the left lung base.  No other focal airspace consolidation.  Bony structures are unremarkable.  CONCLUSION:  1. Small left basilar pneumothorax 2. Severe emphysema       NAVEED CARSON MD       Electronically Signed and Approved By: NAVEED CARSON MD on 10/05/2021 at 15:54             XR Chest 1 View    Result Date: 10/5/2021  PROCEDURE: XR CHEST 1 VW  COMPARISON: [facility], CR, XR CHEST 1 VW, 10/05/2021, 2:20.  INDICATIONS: FOLLOW UP LEFT BASE PNEUMOTHORAX. SHORTNESS OF BREATH.  FINDINGS:  There is severe bullous emphysematous lung disease.  Areas of chronic fibrosis are identified in both bases.  There is a lucency in the left costophrenic sulcus consistent with a left-sided pneumothorax.  Similarly, there is a small pneumothorax over the apex.  There is some subcutaneous emphysema over the left chest wall.  CONCLUSION:  1. Severe bilateral emphysematous lung disease and evidence of chronic  fibrosis and scarring. 2. There is a left-sided pneumothorax that predominantly is basilar.  There is a very small apical component with some stable subcutaneous emphysema.       Candelario Jay MD       Electronically Signed and Approved By: Candelario Jay MD on 10/05/2021 at 9:56             XR Chest 1 View    Result Date: 10/5/2021  PROCEDURE: XR CHEST 1 VW  COMPARISONS: [facility], CT, CT CHEST WO CONTRAST, 9/29/2021, 14:16.   [facility], CR, XR CHEST 1 VW, 10/04/2021, 14:07.   [facility], CR, XR CHEST 1 VW, 10/01/2021, 13:29.   [facility], CR, XR CHEST 2 VW, 10/01/2021, 6:23.   [facility], CR, XR CHEST 1 VW, 10/01/2021, 0:24.   [facility], CR, XR CHEST 1 VW, 9/30/2021, 4:58.   [facility], CR, XR CHEST 1 VW, 9/29/2021, 8:52.   [facility], CR, XR CHEST 1 VW, 9/28/2021, 20:16.   [facility], CR, XR CHEST 1 VW, 9/28/2021, 14:55.   [facility], CR, CHEST AP/PA 1 VIEW, 5/31/2019, 21:48.   [facility], CR, CXR PORTABLE, 4/07/2017, 12:14.   [facility], CR, CXR PORTABLE, 4/07/2017, 6:39.  INDICATIONS: shortness of breath  FINDINGS: Two AP upright portable chest radiographs are provided for review.  Severe bullous emphysematous changes involve the lungs.  Probably no pneumothorax is present.  There is suspected chronic blunting of the right lateral costophrenic sulcus.  The subcutaneous emphysema has decreased since prior recent studies.  No cardiac enlargement.  There may be a left-sided thoracostomy tube in place.  External artifacts obscure detail.  CONCLUSION: Severe bullous emphysematous changes involve the lungs.  No definite pneumothorax is suspected.       NARCISO CRENSHAW JR, MD       Electronically Signed and Approved By: NARCISO CRENSHAW JR, MD on 10/05/2021 at 2:55             XR Chest 1 View    Result Date: 10/4/2021  PROCEDURE: XR CHEST 1 VW   COMPARISON: 10/01/2021  INDICATIONS: SOA Triage Protocol/shortness of breath  FINDINGS:  Left apical pigtail thoracostomy tube has been removed.  There is extensive upper lung predominant pulmonary emphysematous change and large bullae again noted.  There are findings suggesting a small left apical pneumothorax extending approximately 9 millimeters from the apex.  There is a small amount of subcutaneous emphysema noted over the left chest wall, decreased as compared to prior.  There is extensive linear consolidation in the mid and lower lungs bilaterally suggesting compressive atelectasis and/or scarring.  Heart size and mediastinal contour appear stable.  CONCLUSION:   1. Small left apical pneumothorax, measuring approximately 9 millimeters from the apex.  Interval removal of left-sided thoracostomy tube.  Decreasing left sided subcutaneous emphysema.  2. Severe pulmonary emphysematous change with large apically bullae and bilateral mid and lower lung scarring or compressive atelectasis.        JARET AGUIRRE MD       Electronically Signed and Approved By: JARET AGUIRRE MD on 10/04/2021 at 14:38             XR Chest 1 View    Result Date: 10/1/2021  PROCEDURE: XR CHEST 1 VW  COMPARISON: [facility], , XR CHEST 2 VW, 10/01/2021, 6:23.  INDICATIONS: chest tube clamped, evaluate for pneumothorax  FINDINGS:  The left chest tube is stable in position.  No significant residual pneumothorax is observed.  Changes of emphysema with extensive bullous change and scarring are again seen throughout the lungs bilaterally.  The cardiac silhouette and mediastinum are stable.  Air is noted in the soft tissues overlying the left chest and neck related to the chest tube.  CONCLUSION:  1. Stable positioning of the left chest tube.  There is no significant pneumothorax.  Overlying air is again seen in the soft tissues overlying the left aspect of the chest and base of the left neck.       SHARAN WOODS MD        Electronically Signed and Approved By: SHARAN WOODS MD on 10/01/2021 at 14:11             XR Chest 1 View    Result Date: 10/1/2021  PROCEDURE: XR CHEST 1 VW  COMPARISONS: [facility], CT, CT CHEST WO CONTRAST, 9/29/2021, 14:16.   [facility], CR, XR CHEST 1 VW, 9/30/2021, 4:58.   [facility], CR, XR CHEST 1 VW, 9/29/2021, 8:52.   [facility], CR, XR CHEST 1 VW, 9/28/2021, 20:16.  INDICATIONS: crepitus around chest tube site  FINDINGS: Two AP upright portable views of the chest are provided for review.  The left chest tube remains within the expected left pleural space, projected over the superior left thorax, with at least 9 cm of purchase suggested.  Subcutaneous emphysema persists, predominantly in the left base of neck and left lateral chest wall, similar to the 9/30/2021 study at 0458 hours.  Severe bullous emphysematous changes involve the lungs, as before.  Probably no new or recurrent pneumothorax is identified.  No cardiac enlargement.  Probably no acute infiltrate is identified.  CONCLUSION: No significant interval change is appreciated since the prior exam from 9/30/2021 at 0458 hours.      NARCISO CRENSHAW JR, MD       Electronically Signed and Approved By: NARCISO CRENSHAW JR, MD on 10/01/2021 at 2:11             XR Chest 1 View    Result Date: 9/30/2021  PROCEDURE: XR CHEST 1 VW  COMPARISONS: [facility], CT, CT CHEST W CONTRAST, 9/28/2021, 17:07.   [facility], CR, XR CHEST 1 VW, 9/28/2021, 20:16.   [facility], CR, XR CHEST 1 VW, 9/28/2021, 14:55.   [facility], CR, CHEST AP/PA 1 VIEW, 5/31/2019, 21:48.   [facility] CR, XR CHEST 1 VW, 9/29/2021, 8:52.  INDICATIONS: f/u left pneumothorax  FINDINGS: A single AP supine portable chest radiograph is provided for review.  The small-bore left chest tube is thought to remain in place but appears slightly more retracted when compared with prior  recent studies from 9/28/2021 and 9/29/2021.  On the current study, approximately 14 cm of the left chest tube is projected over the expected left pleural space.  On the prior exam from 9/29/2021, approximately 19 cm of the left-sided chest tube was projected over the left pleural space.  The small-bore left chest tube may have become retracted approximately 5 cm.  Its distal pigtail portion has become somewhat unformed, probably secondary to the retraction.  No definite recurrence of the subpulmonic left-sided pneumothorax is seen (as on the 9/28/2021 study at 1455 hours).  Again, severe bullous emphysematous changes involve the lungs.  Subcutaneous emphysema is seen, especially involving the left chest wall, proximal left upper extremity, and left base of neck.  No cardiac enlargement.  Probably no pneumothorax is seen on the right.  External artifacts obscure detail.  Pleural-parenchymal scarring involving the lungs is also suspected.  CONCLUSION:   1. The left-sided chest tube may have become somewhat retracted (approximately 5 cm) when compared with the prior study from 9/29/2021 at 0852 hours.  Its distal-most portion is thought to remain within the left pleural space.   2. No recurrence of the left pneumothorax is suggested.   3. Otherwise, no significant interval change is appreciated radiographically since 9/29/2021.    NARCISO CRENSHAW JR, MD       Electronically Signed and Approved By: NARCISO CRENSHAW JR, MD on 9/30/2021 at 5:54             XR Chest 1 View    Result Date: 9/29/2021  PROCEDURE: XR CHEST 1 VW  COMPARISON: [facility], CR, CHEST AP/PA 1 VIEW, 5/31/2019, 21:48.  [facility], CR, XR CHEST 1 VW, 9/28/2021, 14:55.  [facility], CT, CT CHEST W CONTRAST DIAGNOSTIC, 9/28/2021, 17:07.  [facility], CR, XR CHEST 1 VW, 9/28/2021, 20:16.  INDICATIONS: pneumothorax  FINDINGS:  A pigtail tube is seen in the left hemithorax.  There is subcutaneous air along  the left lateral chest wall extending into the neck.  There is marked emphysematous change with areas of scarring/atelectasis involving both lungs.  There are interstitial changes within the left lung which have been suggested.  Assessment for pneumothorax is somewhat limited due to underlying chronic areas of scar down lung with areas in each hemithorax of lack of normal lung parenchyma.  There has been improvement in the pneumothorax on the left since prior day's x-ray.  It is hard to tell whether there may be a residual pneumothorax on the left.  CONCLUSION:  1. Emphysematous changes with areas of scar down lung with non pulmonary areas within each hemithorax. 2. The suggested pneumothorax on the prior day's study in the left hemithorax is improved.  Whether there is residual pneumothorax is difficult to completely clarify due to more chronic lung changes. 3. Areas of atelectasis or scarring in the remaining lung parenchyma. 4. Subcutaneous air along the left lateral chest wall extending into the neck.       NIKKI CARSON MD       Electronically Signed and Approved By: NIKKI CARSON MD on 9/29/2021 at 10:54             XR Chest 1 View    Result Date: 9/28/2021  PROCEDURE: XR CHEST 1 VW  COMPARISON: [facility], CT, CT CHEST W CONTRAST DIAGNOSTIC, 9/28/2021, 17:07.  [facility], CR, XR CHEST 1 VW, 9/28/2021, 14:55.  INDICATIONS: CHEST TUBE PLACEMENT  FINDINGS:  Pigtail left chest tube has been placed.  Left pneumothorax is smaller, the basilar component of the pneumothorax is almost completely resolved.  Subcutaneous emphysema is evident.  Severe bullous emphysema is again seen.  Bony structures appear intact.  CONCLUSION:  Pigtail left chest tube in place.  Left pneumothorax appears smaller.  Subcutaneous emphysema.       ALY CALDERON MD       Electronically Signed and Approved By: ALY CALDERON MD on 9/28/2021 at 20:48             XR Chest 1 View    Result Date:  9/28/2021  PROCEDURE: XR CHEST 1 VW  COMPARISON: [facility], CR, CHEST AP/PA 1 VIEW, 5/31/2019, 21:48.  INDICATIONS: SHORTNESS OF BREATH  FINDINGS:   There is been interval worsening of the severe bullous emphysema in the lung fields.  There is a questionable left pneumothorax versus worsening bullous emphysema.  Stable blunting of the costophrenic angles.  There is infiltrate in the left midlung field.  The heart and pulmonary vasculature are within normal limits.  IMPRESSION: 1. Questionable left pneumothorax versus worsening bullous emphysema.  Suggest a follow-up CT scan of the chest.  2. Infiltrate in the left midlung field.    ARTHUR RIOS MD       Electronically Signed and Approved By: ARTHUR RIOS MD on 9/28/2021 at 15:01             XR Chest PA & Lateral    Result Date: 10/7/2021  PROCEDURE: XR CHEST PA AND LATERAL  COMPARISONS: [facility], CR, XR CHEST 1 VW, 10/06/2021, 4:28.   [facility], CR, XR CHEST 1 VW, 10/05/2021, 14:48.   [facility], CR, XR CHEST 1 VW, 10/05/2021, 8:43.   [facility], CR, XR CHEST 1 VW, 10/05/2021, 2:20.   [facility], CR, XR CHEST 1 VW, 10/04/2021, 14:07.   [facility], CR, XR CHEST 1 VW, 10/01/2021, 13:29.   [facility], CR, XR CHEST 2 VW, 10/01/2021, 6:23.   [facility], CR, XR CHEST 1 VW, 10/01/2021, 0:24.   [facility], CR, XR CHEST 1 VW, 9/30/2021, 4:58.   [facility], CR, CHEST PA/AP & LAT 2V, 8/08/2010, 5:48.  INDICATIONS: follow-up left pneumothorax  FINDINGS: Two views of the chest are provided for review.  The left subpulmonic pneumothorax persists, not significantly changed since 10/6/2021 at 0428 hours.  In the differential diagnosis for the finding in the inferior left thorax would be a trapped lung syndrome (or an unexpandable portion of the lower lobe of the left lung).  Severe bullous emphysematous changes involve  the lungs, as before.  There is thought to be chronic blunting of the costophrenic sulci, related to pleural-parenchymal scarring and emphysema.  No cardiomediastinal enlargement is seen.  No definite right-sided pneumothorax.  External artifacts obscure detail.  There is mild subcutaneous emphysema persisting, especially on the left.  CONCLUSION: No significant interval change is suggested in the subpulmonic left-sided pneumothorax since 10/6/2021 at 0428 hours.  Please see above comments for further detail.      NARCISO CRENSHAW JR, MD       Electronically Signed and Approved By: NARCISO CRENSHAW JR, MD on 10/07/2021 at 6:28               LAB RESULTS:      Lab 10/06/21  0427 10/05/21  0214 10/04/21  1400   WBC 6.43 6.77 10.47   HEMOGLOBIN 13.9 14.4 14.5   HEMATOCRIT 40.1 42.5 42.4   PLATELETS 218 240 226   NEUTROS ABS  --  4.06 8.39*   IMMATURE GRANS (ABS)  --  0.02 0.04   LYMPHS ABS  --  1.72 1.22   MONOS ABS  --  0.72 0.66   EOS ABS  --  0.21 0.13   MCV 91.6 92.4 92.6   LACTATE  --   --  1.4         Lab 10/06/21  0427 10/05/21  0808 10/05/21  0214 10/04/21  1400   SODIUM 141 137 143 142   POTASSIUM 4.7 4.7 5.1 4.1   CHLORIDE 107 105 109* 108*   CO2 23.9 25.1 21.7* 23.5   ANION GAP 10.1 6.9 12.3 10.5   BUN 18 19 19 17   CREATININE 1.18 1.05 1.20 1.03   GLUCOSE 159* 101* 84 107*   CALCIUM 8.7 8.9 8.5* 8.7         Lab 10/05/21  0214 10/04/21  1400   TOTAL PROTEIN 5.8* 6.2   ALBUMIN 3.90 4.10   GLOBULIN 1.9 2.1   ALT (SGPT) 21 20   AST (SGOT) 27 18   BILIRUBIN 0.3 0.4   ALK PHOS 59 61         Lab 10/05/21  0214 10/04/21  1400   PROBNP 62.5 38.6   TROPONIN T <0.010 <0.010                 Brief Urine Lab Results     None        Microbiology Results (last 10 days)     Procedure Component Value - Date/Time    Blood Culture - Blood, Arm, Right [[id number]] Collected: 10/04/21 1400    Lab Status: Preliminary result Specimen: Blood from Arm, Right Updated: 10/06/21 1415     Blood Culture No growth at 2 days    Blood Culture -  Blood, Blood, Venous Line [[id number]] Collected: 10/04/21 1400    Lab Status: Preliminary result Specimen: Blood, Venous Line Updated: 10/06/21 1415     Blood Culture No growth at 2 days    Blood Culture - Blood, Arm, Right [[id number]] Collected: 09/28/21 1501    Lab Status: Final result Specimen: Blood from Arm, Right Updated: 10/03/21 1516     Blood Culture No growth at 5 days    Blood Culture - Blood, Arm, Left [[id number]] Collected: 09/28/21 1501    Lab Status: Final result Specimen: Blood from Arm, Left Updated: 10/03/21 1516     Blood Culture No growth at 5 days                 Labs Pending at Discharge: NONE        Time spent on Discharge including face to face service:  >30 minutes    Electronically signed by Rick Hope DO, 10/07/21, 10:17 AM EDT.

## 2021-10-07 NOTE — PLAN OF CARE
Goal Outcome Evaluation:  Plan of Care Reviewed With: patient        Progress: improving  Outcome Summary: PT DID NOT REQUIRE O2 LAST NIGHT, STATED BREATHING FEELS A LITTLE EASIER, EAGER TO GO HOME.Esperanza Lora RN

## 2021-10-07 NOTE — PROGRESS NOTES
Pulmonary / Critical Care Progress Note      Patient Name: Yogesh Vicente  : 1975  MRN: [id number]  Attending:  Rick Hope DO  Date of admission: 10/5/2021    Subjective   Subjective   Follow-up for left pneumothorax.    No acute events overnight.     This morning,  Ambulating around unit  On room air  No further subcutaneous air  Intermittent dyspnea  No further chest pains  Nonproductive cough  Feels better  Wants to go home  Chest x-ray this morning with no significant changes in left basilar pneumothorax    Review of Systems  General: Denied complaints  Cardiovascular:  Denied complaints  Respiratory: dyspnea (improved), pleuritic chest pain (improved), otherwise denied complaints  Gastrointestinal: Denied complaints    Objective   Objective     Vitals:   Temp:  [97.7 °F (36.5 °C)-99.2 °F (37.3 °C)] 97.7 °F (36.5 °C)  Heart Rate:  [] 96  Resp:  [16-18] 18  BP: ()/(65-83) 113/83    Physical Exam   Vital Signs Reviewed   General: Thin male, Awake and Alert, NAD on room air    HEENT:  PERRL, EOMI.  OP, nares clear  Chest:  poor aeration, barrel chested, diminished on left, tympanic to percussion bilaterally, no work of breathing noted, no subcutaneous air noted to anterior left chest wall  CV: , no MGR, pulses 2+, equal  Abd:  Soft, NT, ND, + BS, no HSM  EXT:  no clubbing, no cyanosis, no edema  Neuro:  A&Ox3, CN grossly intact, no focal deficits  Skin: No rashes or lesions noted     Result Review    Result Review:  I have personally reviewed the results from the time of this admission to 10/7/2021 14:56 EDT and agree with these findings:  [x]  Laboratory  [x]  Microbiology  [x]  Radiology  []  EKG/Telemetry   []  Cardiology/Vascular   []  Pathology  []  Old records  []  Other:  Most notable findings include: No am labs    10/7/21 CXR this am no change in left basilar subpulmonic pneumothorax    Assessment/Plan   Assessment / Plan     Active Hospital Problems:  Active Hospital Problems     Diagnosis    • **Pneumothorax on left    • Pulmonary fibrosis (HCC)    • Tobacco abuse    • Bullous emphysema (HCC)      Impression:  Recurrent secondary left pneumothorax secondary to ruptured bleb  Severe bullous emphysema  Pulmonary fibrosis  Severe emphysema without exacerbation  Tobacco abuse  Hx of amphetamine use     Plan:  CXR this morning personally reviewed showing no changes in left basilar pneumothorax  On room air.  Up ambulating around nursing unit without shortness of breath or chest pain.     From pulmonary standpoint ok to discharge patient home.  Will need to repeat PA/LAT CXR early next week and follow up in our office    DVT prophylaxis:  Mechanical DVT prophylaxis orders are present.    CODE STATUS:   Limited Support to NOT Include: Intubation; Cardioversion/Defibrillation  Level Of Support Discussed With: Patient  Code Status: No CPR  Medical Interventions (Level of Support Prior to Arrest): Limited      Labs, imaging, notes and medications personally reviewed.  Discussed with primary service and bedside RN.    Electronically signed by CATHIE Jimenez, 10/07/21, 12:21 PM EDT.  Electronically signed by Peter Julio MD, 10/07/21, 2:57 PM EDT.

## 2021-10-07 NOTE — PLAN OF CARE
Goal Outcome Evaluation:      Pt is breathing better and has ambulated in the sweet several times ad valerie, he states that he is ready to go. VSS no c/o of pain.

## 2021-10-08 ENCOUNTER — READMISSION MANAGEMENT (OUTPATIENT)
Dept: CALL CENTER | Facility: HOSPITAL | Age: 46
End: 2021-10-08

## 2021-10-08 NOTE — OUTREACH NOTE
Prep Survey      Responses   [facility] patient discharged from?  [facility]   Is LACE score < 7 ?  No   Emergency Room discharge w/ pulse ox?  No   Eligibility  Readm Mgmt   Discharge diagnosis  Pneumothorax on left    Does the patient have one of the following disease processes/diagnoses(primary or secondary)?  Other   Does the patient have Home health ordered?  No   Is there a DME ordered?  No   Prep survey completed?  Yes          Fang Kaplan RN

## 2021-10-08 NOTE — OUTREACH NOTE
Medical Week 1 Survey      Responses   [facility] patient discharged from?  [facility]   Does the patient have one of the following disease processes/diagnoses(primary or secondary)?  Other   Week 1 attempt successful?  Yes   Call start time  1541   Call end time  1544   Discharge diagnosis  Pneumothorax on left    Meds reviewed with patient/caregiver?  Yes   Is the patient having any side effects they believe may be caused by any medication additions or changes?  No   Does the patient have all medications ordered at discharge?  N/A   Is the patient taking all medications as directed (includes completed medication regime)?  Yes   Comments regarding appointments  Pulm appt 10/14/21   Does the patient have a primary care provider?   Yes   Does the patient have an appointment with their PCP within 7 days of discharge?  No   What is preventing the patient from scheduling follow up appointments within 7 days of discharge?  Haven't had time   Nursing Interventions  Advised patient to make appointment   Has the patient kept scheduled appointments due by today?  N/A   Psychosocial issues?  No   Notified Case Management  Psychosocial (Non Urgent) [Pt needs a return to work note]   Did the patient receive a copy of their discharge instructions?  Yes   Nursing interventions  Reviewed instructions with patient   What is the patient's perception of their health status since discharge?  Improving   Is the patient/caregiver able to teach back signs and symptoms related to disease process for when to call PCP?  Yes   Is the patient/caregiver able to teach back signs and symptoms related to disease process for when to call 911?  Yes   Is the patient/caregiver able to teach back the hierarchy of who to call/visit for symptoms/problems? PCP, Specialist, Home health nurse, Urgent Care, ED, 911  Yes   If the patient is a current smoker, are they able to teach back resources for cessation?  Smoking cessation medications   Week 1 call  completed?  Yes          Luzma Black RN

## 2021-10-09 LAB
BACTERIA SPEC AEROBE CULT: NORMAL
BACTERIA SPEC AEROBE CULT: NORMAL

## 2021-10-18 ENCOUNTER — READMISSION MANAGEMENT (OUTPATIENT)
Dept: CALL CENTER | Facility: HOSPITAL | Age: 46
End: 2021-10-18

## 2021-10-18 NOTE — PROGRESS NOTES
Primary Care Provider  Martin Friedman MD     Referring Provider  Rick Hope DO     Chief Complaint  Pneumothorax, [facility] follow up, and Shortness of Breath    Subjective          History of Presenting Illness  Patient is a 46 y.o. male was recently hospitalized at [facility] from 10/5/2021 to 10/7/2021 with severe bullous emphysema, and left-sided pneumothorax who presents for follow-up visit today.  Patient does have a history of ongoing tobacco use who presented with shortness of breath and left-sided chest pain.  He had a recent hospitalization for left-sided pneumonia thorax requiring chest tube placement.  A CT chest revealed severe emphysematous bullous changes with persistent subpulmonic left-sided pneumothorax which was improved slightly from prior imaging.  Pulmonology was consulted and chest x-rays were repeated.  The size of the pneumothorax remain the same therefore chest tube was not indicated.  He remained on room air and was ambulating without distress.  Cleared by pulmonology with 1 week follow-up and repeat chest x-ray prior to this.  Discharged home in stable condition. Patient was told to have chest x-ray completed prior to office visit today unfortunately patient did not have this completed yet. Patient states that his shortness of air has improved since hospital stay. Patient states that he is taking albuterol inhaler as needed. Patient states that he uses albuterol inhaler about twice a week. Patient states that he has been on inhalers in the past for COPD, however he does not recall which inhalers he has taken in the past. Patient states he still smoking a pack of cigarettes a day and is not ready to quit at this time. Patient denies fever, chills, night sweats, swollen glands in the head and neck, unintentional weight loss, hemoptysis, purulent sputum production, dysphagia, chest pain, palpitations, chest tightness, abdominal pain, nausea, vomiting, and diarrhea.  Patient  also denies any myalgias, changes in sense of taste and/or smell, sore throat, any other coronavirus or flu-like symptoms.  Patient denies any leg swelling, orthopnea, paroxysmal nocturnal dyspnea.  Patient is able to perform activities of daily living.      Review of Systems   Constitutional: Negative for activity change, appetite change, chills, diaphoresis, fatigue, fever, unexpected weight gain and unexpected weight loss.        Negative for Insomnia   HENT: Negative for congestion (Nasal), mouth sores, nosebleeds, postnasal drip, sore throat, swollen glands and trouble swallowing.         Negative for Thrush  Negative for Hoarseness  Negative for Allergies/Hay Fever  Negative for Recent Head injury  Negative for Ear Fullness  Negative for Nasal or Sinus pain  Negative for Dry lips  Negative for Nasal discharge   Respiratory: Positive for shortness of breath (improved since hospital stay). Negative for apnea, cough, chest tightness and wheezing.         Negative for Hemoptysis  Negative for Pleuritic pain   Cardiovascular: Negative for chest pain, palpitations and leg swelling.        Negative for Claudication  Negative for Cyanosis  Negative for Dyspnea on exertion   Gastrointestinal: Negative for abdominal pain, diarrhea, nausea, vomiting and GERD.   Musculoskeletal: Negative for joint swelling and myalgias.        Negative for Joint pain  Negative for Joint stiffness   Skin: Negative for color change, dry skin, pallor and rash.   Neurological: Negative for syncope, weakness and headache.   Hematological: Negative for adenopathy. Does not bruise/bleed easily.        History reviewed. No pertinent family history.     Social History     Socioeconomic History   • Marital status:    Tobacco Use   • Smoking status: Current Every Day Smoker     Packs/day: 1.00     Years: 33.00     Pack years: 33.00     Types: Cigarettes   • Smokeless tobacco: Never Used   Vaping Use   • Vaping Use: Never used   Substance and  "Sexual Activity   • Alcohol use: Not Currently   • Drug use: Yes     Types: Amphetamines, Marijuana     Comment: stopped using 2 years   • Sexual activity: Not Currently     Partners: Female        Past Medical History:   Diagnosis Date   • COPD (chronic obstructive pulmonary disease) (HCC)    • Pneumothorax           There is no immunization history on file for this patient.    No Known Allergies       Current Outpatient Medications:   •  albuterol sulfate  (90 Base) MCG/ACT inhaler, Inhale 2 puffs Every 4 (Four) Hours As Needed for Wheezing or Shortness of Air., Disp: 8 g, Rfl: 0  •  tiotropium bromide-olodaterol (Stiolto Respimat) 2.5-2.5 MCG/ACT aerosol solution inhaler, Inhale 2 puffs Daily for 30 days., Disp: 1 each, Rfl: 5     Objective     Physical Exam  Vital Signs Reviewed  WDWN, Alert, NAD.    HEENT:  PERRL, EOMI.  OP, nares clear, no sinus tenderness  Neck:  Supple, no JVD, no thyromegaly.  Lymph: no axillary, cervical, supraclavicular lymphadenopathy noted bilaterally  Chest:  good aeration, clear to auscultation bilaterally, tympanic to percussion bilaterally, no work of breathing noted  CV: RRR, no MGR, pulses 2+, equal.  Abd:  Soft, NT, ND, + BS, no HSM  EXT:  no clubbing, no cyanosis, no edema, no joint tenderness  Neuro:  A&Ox3, CN grossly intact, no focal deficits.  Skin: No rashes or lesions noted.    Vital Signs:   /92   Pulse 81   Temp 97.8 °F (36.6 °C)   Resp 14   Ht 172.7 cm (68\")   Wt 63.1 kg (139 lb 3.2 oz)   SpO2 99% Comment: room air  BMI 21.17 kg/m²         Result Review :   I have personally reviewed discharge summary and imaging study reports from recent hospital stay. See scanned documents.         Assessment and Plan      Assessment:  1. Recurrent secondary left pneumothorax secondary to ruptured bleb.  2. Severe bullous emphysema.  3. Pulmonary fibrosis.  4. Severe emphysema without exacerbation.  5. Tobacco abuse.   6. Hx of amphetamine use.  7. Alpha-1 " antitrypsin 159 with phenotype MM.      Plan:  Rpt chest x-ray?  1. I will order a repeat chest x-ray. Patient is advised to have this completed today. Advised patient if chest x-ray is still showing a pneumothorax we will proceed with further intervention such as a noncontrast chest CT and refer patient to thoracic surgeon. Patient verbalized understanding.  2. We will start patient on Stiolto 2 puffs once daily.   3. Continue albuterol inhaler as needed.  4. Patient not wanting pulmonary function test at this time and would prefer to wait. Will discuss with patient at follow-up visit.  5. Patient refuses flu and all pneumonia vaccines. Patient states that he has a history of substance abuse with needles in the past. Patient does not want any vaccine due to needles. Risks of refusing vaccines discussed with the patient. Patient verbalized understanding. Patient declines COVID-19 vaccination.  Discussed with patient the benefits of vaccination including decreased risk of severe illness, hospitalization and death related to COVID-19.  Patient verbalized understanding.  Patient is advised to continue to follow CDC recommendations such as social distancing, wearing a mask, and washing hands for least 20 seconds.   6.  Patient to call the office, 911, or go to the ER with new or worsening symptoms.  7. I counseled the patient on smoking cessation.  I counseled the patient on the risks of continued smoking including the risk of lung cancer, head and neck cancer, renal cancer, heart disease, stroke, and early death.  Patient refuses nicotine replacement therapy or pharmacotherapy at this time.  Patient is advised to decrease the number of cigarettes they are smoking up until the point to where they can quit.  8. Follow-up in 4 weeks, sooner if needed.            Follow Up   Return in about 4 weeks (around 11/23/2021) for with Dr. Julio or MD.  Patient was given instructions and counseling regarding his condition or for  health maintenance advice. Please see specific information pulled into the AVS if appropriate.

## 2021-10-18 NOTE — OUTREACH NOTE
Medical Week 2 Survey      Responses   [facility] patient discharged from? [facility]   Does the patient have one of the following disease processes/diagnoses(primary or secondary)? Other   Week 2 attempt successful? No   Unsuccessful attempts Attempt 1   Discharge diagnosis Pneumothorax on left           Denise Logan RN

## 2021-10-18 NOTE — PATIENT INSTRUCTIONS
Pulmonary Fibrosis    Pulmonary fibrosis is a type of lung disease that causes scarring. Over time, the scar tissue builds up in the air sacs of your lungs (alveoli). This makes it hard for you to breathe. Less oxygen can get into your blood.  Scarring from pulmonary fibrosis gets worse over time. This damage is permanent and may lead to other serious health problems.  What are the causes?  There are many different causes of pulmonary fibrosis. Sometimes the cause is not known. This is called idiopathic pulmonary fibrosis. Other causes include:  · Exposure to chemicals and substances found in agricultural, farm, construction, or factory work. These include mold, asbestos, silica, metal dusts, and toxic fumes.  · Sarcoidosis. In this disease, areas of inflammatory cells (granulomas) form and most often affect the lungs.  · Autoimmune diseases. These include diseases such as rheumatoid arthritis, systemic sclerosis, or connective tissue disease.  · Taking certain medicines. These include drugs used in radiation therapy or used to treat seizures, heart problems, and some infections.  What increases the risk?  You are more likely to develop this condition if:  · You have a family history of the disease.  · You are older. The condition is more common in older adults.  · You have a history of smoking.  · You have a job that exposes you to certain chemicals.  · You have gastroesophageal reflux disease (GERD).  What are the signs or symptoms?  Symptoms of this condition include:  · Difficulty breathing that gets worse with activity.  · Shortness of breath (dyspnea).  · Dry, hacking cough.  · Rapid, shallow breathing during exercise or while at rest.  · Bluish skin and lips.  · Loss of appetite.  · Weakness.  · Weight loss and fatigue.  · Rounded and enlarged fingertips (clubbing).  How is this diagnosed?  This condition may be diagnosed based on:  · Your symptoms and medical history.  · A physical exam.  You may also have  tests, including:  · A test that involves looking inside your lungs with an instrument (bronchoscopy).  · Imaging studies of your lungs and heart.  · Tests to measure how well you are breathing (pulmonary function tests).  · Blood tests.  · Tests to see how well your lungs work while you are walking (pulmonary stress test).  · A procedure to remove a lung tissue sample to look at it under a microscope (biopsy).  How is this treated?  There is no cure for pulmonary fibrosis. Treatment focuses on managing symptoms and preventing scarring from getting worse. This may include:  · Medicines, such as:  ? Steroids to prevent permanent lung changes.  ? Medicines to suppress your body's defense system (immune system).  ? Medicines to help with lung function by reducing inflammation or scarring.  · Ongoing monitoring with X-rays and lab work.  · Oxygen therapy.  · Pulmonary rehabilitation.  · Surgery. In some cases, a lung transplant is possible.  Follow these instructions at home:         Medicines  · Take over-the-counter and prescription medicines only as told by your health care provider.  · Keep your vaccinations up to date as recommended by your health care provider.  General instructions  · Do not use any products that contain nicotine or tobacco, such as cigarettes and e-cigarettes. If you need help quitting, ask your health care provider.  · Get regular exercise, but do not overexert yourself. Ask your health care provider to suggest some activities that are safe for you to do.  ? If you have physical limitations, you may get exercise by walking, using a stationary bike, or doing chair exercises.  ? Ask your health care provider about using oxygen while exercising.  · If you are exposed to chemicals and substances at work, make sure that you wear a mask or respirator at all times.  · Join a pulmonary rehabilitation program or a support group for people with pulmonary fibrosis.  · Eat small meals often so you do not  get too full. Overeating can make breathing trouble worse.  · Maintain a healthy weight. Lose weight if you need to.  · Do breathing exercises as directed by your health care provider.  · Keep all follow-up visits as told by your health care provider. This is important.  Contact a health care provider if you:  · Have symptoms that do not get better with medicines.  · Are not able to be as active as usual.  · Have trouble taking a deep breath.  · Have a fever or chills.  · Have blue lips or skin.  · Have clubbing of your fingers.  Get help right away if you:  · Have a sudden worsening of your symptoms.  · Have chest pain.  · Cough up mucus that is dark in color.  · Have a lot of headaches.  · Get very confused or sleepy.  Summary  · Pulmonary fibrosis is a type of lung disease that causes scar tissue to build up in the air sacs of your lungs (alveoli) over time. Less oxygen can get into your blood. This makes it hard for you to breathe.  · Scarring from pulmonary fibrosis gets worse over time. This damage is permanent and may lead to other serious health problems.  · You are more likely to develop this condition if you have a family history of the condition or a job that exposes you to certain chemicals.  · There is no cure for pulmonary fibrosis. Treatment focuses on managing symptoms and preventing scarring from getting worse.  This information is not intended to replace advice given to you by your health care provider. Make sure you discuss any questions you have with your health care provider.  Document Revised: 01/23/2019 Document Reviewed: 01/23/2019  [employer] Patient Education © 2021 [employer]      Managing the Challenge of Quitting Smoking  Quitting smoking is a physical and mental challenge. You will face cravings, withdrawal symptoms, and temptation. Before quitting, work with your health care provider to make a plan that can help you manage quitting. Preparation can help you quit and keep you from  giving in.  How to manage lifestyle changes  Managing stress  Stress can make you want to smoke, and wanting to smoke may cause stress. It is important to find ways to manage your stress. You might try some of the following:  · Practice relaxation techniques.  ? Breathe slowly and deeply, in through your nose and out through your mouth.  ? Listen to music.  ? Soak in a bath or take a shower.  ? Imagine a peaceful place or vacation.  · Get some support.  ? Talk with family or friends about your stress.  ? Join a support group.  ? Talk with a counselor or therapist.  · Get some physical activity.  ? Go for a walk, run, or bike ride.  ? Play a favorite sport.  ? Practice yoga.    Medicines  Talk with your health care provider about medicines that might help you deal with cravings and make quitting easier for you.  Relationships  Social situations can be difficult when you are quitting smoking. To manage this, you can:  · Avoid parties and other social situations where people might be smoking.  · Avoid alcohol.  · Leave right away if you have the urge to smoke.  · Explain to your family and friends that you are quitting smoking. Ask for support and let them know you might be a bit grumpy.  · Plan activities where smoking is not an option.  General instructions  Be aware that many people gain weight after they quit smoking. However, not everyone does. To keep from gaining weight, have a plan in place before you quit and stick to the plan after you quit. Your plan should include:  · Having healthy snacks. When you have a craving, it may help to:  ? Eat popcorn, carrots, celery, or other cut vegetables.  ? Chew sugar-free gum.  · Changing how you eat.  ? Eat small portion sizes at meals.  ? Eat 4-6 small meals throughout the day instead of 1-2 large meals a day.  ? Be mindful when you eat. Do not watch television or do other things that might distract you as you eat.  · Exercising regularly.  ? Make time to exercise each  day. If you do not have time for a long workout, do short bouts of exercise for 5-10 minutes several times a day.  ? Do some form of strengthening exercise, such as weight lifting.  ? Do some exercise that gets your heart beating and causes you to breathe deeply, such as walking fast, running, swimming, or biking. This is very important.  · Drinking plenty of water or other low-calorie or no-calorie drinks. Drink 6-8 glasses of water daily.    How to recognize withdrawal symptoms  Your body and mind may experience discomfort as you try to get used to not having nicotine in your system. These effects are called withdrawal symptoms. They may include:  · Feeling hungrier than normal.  · Having trouble concentrating.  · Feeling irritable or restless.  · Having trouble sleeping.  · Feeling depressed.  · Craving a cigarette.  To manage withdrawal symptoms:  · Avoid places, people, and activities that trigger your cravings.  · Remember why you want to quit.  · Get plenty of sleep.  · Avoid coffee and other caffeinated drinks. These may worsen some of your symptoms.  These symptoms may surprise you. But be assured that they are normal to have when quitting smoking.  How to manage cravings  Come up with a plan for how to deal with your cravings. The plan should include the following:  · A definition of the specific situation you want to deal with.  · An alternative action you will take.  · A clear idea for how this action will help.  · The name of someone who might help you with this.  Cravings usually last for 5-10 minutes. Consider taking the following actions to help you with your plan to deal with cravings:  · Keep your mouth busy.  ? Chew sugar-free gum.  ? Suck on hard candies or a straw.  ? Brush your teeth.  · Keep your hands and body busy.  ? Change to a different activity right away.  ? Squeeze or play with a ball.  ? Do an activity or a hobby, such as making bead jewelry, practicing needlepoint, or working with  wood.  ? Mix up your normal routine.  ? Take a short exercise break. Go for a quick walk or run up and down stairs.  · Focus on doing something kind or helpful for someone else.  · Call a friend or family member to talk during a craving.  · Join a support group.  · Contact a quitline.  Where to find support  To get help or find a support group:  · Call the [employer] [place]'s Smoking Quitline: 6-318-QUIT NOW (211-6974)  · Visit the website of the Substance Abuse and Mental Health Services Administration: www.samhsa.gov  · Text QUIT to SmokefreeTXT: 280482  Where to find more information  Visit these websites to find more information on quitting smoking:  · [employer] [place]: www.smokefree.gov  · American Lung Association: www.lung.org  · American Cancer Society: www.cancer.org  · Centers for Disease Control and Prevention: www.cdc.gov  · American Heart Association: www.heart.org  Contact a health care provider if:  · You want to change your plan for quitting.  · The medicines you are taking are not helping.  · Your eating feels out of control or you cannot sleep.  Get help right away if:  · You feel depressed or become very anxious.  Summary  · Quitting smoking is a physical and mental challenge. You will face cravings, withdrawal symptoms, and temptation to smoke again. Preparation can help you as you go through these challenges.  · Try different techniques to manage stress, handle social situations, and prevent weight gain.  · You can deal with cravings by keeping your mouth busy (such as by chewing gum), keeping your hands and body busy, calling family or friends, or contacting a quitline for people who want to quit smoking.  · You can deal with withdrawal symptoms by avoiding places where people smoke, getting plenty of rest, and avoiding drinks with caffeine.  This information is not intended to replace advice given to you by your health care provider. Make sure you discuss any questions you  have with your health care provider.  Document Revised: 10/06/2020 Document Reviewed: 10/06/2020  [employer] Patient Education © 2021 [employer]

## 2021-10-21 LAB — QT INTERVAL: 357 MS

## 2021-10-22 ENCOUNTER — READMISSION MANAGEMENT (OUTPATIENT)
Dept: CALL CENTER | Facility: HOSPITAL | Age: 46
End: 2021-10-22

## 2021-10-22 NOTE — OUTREACH NOTE
Medical Week 3 Survey      Responses   [facility] patient discharged from? [facility]   Does the patient have one of the following disease processes/diagnoses(primary or secondary)? Other   Week 3 attempt successful? No   Unsuccessful attempts Attempt 1          Valencia Casas RN

## 2021-10-25 ENCOUNTER — READMISSION MANAGEMENT (OUTPATIENT)
Dept: CALL CENTER | Facility: HOSPITAL | Age: 46
End: 2021-10-25

## 2021-10-25 NOTE — OUTREACH NOTE
Medical Week 3 Survey      Responses   [facility] patient discharged from? [facility]   Does the patient have one of the following disease processes/diagnoses(primary or secondary)? Other   Week 3 attempt successful? No   Unsuccessful attempts Attempt 2          Soraida Ryan LPN

## 2021-10-26 ENCOUNTER — HOSPITAL ENCOUNTER (OUTPATIENT)
Dept: GENERAL RADIOLOGY | Facility: HOSPITAL | Age: 46
Discharge: HOME | End: 2021-10-26
Admitting: NURSE PRACTITIONER

## 2021-10-26 ENCOUNTER — OFFICE VISIT (OUTPATIENT)
Dept: PULMONOLOGY | Facility: CLINIC | Age: 46
End: 2021-10-26

## 2021-10-26 VITALS
HEART RATE: 81 BPM | WEIGHT: 139.2 LBS | DIASTOLIC BLOOD PRESSURE: 92 MMHG | HEIGHT: 68 IN | RESPIRATION RATE: 14 BRPM | TEMPERATURE: 97.8 F | OXYGEN SATURATION: 99 % | SYSTOLIC BLOOD PRESSURE: 120 MMHG | BODY MASS INDEX: 21.1 KG/M2

## 2021-10-26 DIAGNOSIS — J93.9 PNEUMOTHORAX ON LEFT: Primary | ICD-10-CM

## 2021-10-26 DIAGNOSIS — Z72.0 TOBACCO ABUSE: ICD-10-CM

## 2021-10-26 DIAGNOSIS — J84.10 PULMONARY FIBROSIS (HCC): ICD-10-CM

## 2021-10-26 DIAGNOSIS — J93.9 PNEUMOTHORAX ON LEFT: ICD-10-CM

## 2021-10-26 DIAGNOSIS — J43.9 BULLOUS EMPHYSEMA (HCC): ICD-10-CM

## 2021-10-26 DIAGNOSIS — J43.9 PULMONARY EMPHYSEMA, UNSPECIFIED EMPHYSEMA TYPE (HCC): ICD-10-CM

## 2021-10-26 PROCEDURE — 71046 X-RAY EXAM CHEST 2 VIEWS: CPT

## 2021-10-26 PROCEDURE — 99214 OFFICE O/P EST MOD 30 MIN: CPT | Performed by: NURSE PRACTITIONER

## 2021-10-26 RX ORDER — TIOTROPIUM BROMIDE AND OLODATEROL 3.124; 2.736 UG/1; UG/1
2 SPRAY, METERED RESPIRATORY (INHALATION)
Qty: 1 EACH | Refills: 5 | Status: SHIPPED | OUTPATIENT
Start: 2021-10-26 | End: 2021-11-25

## 2021-10-31 LAB — QT INTERVAL: 367 MS

## 2021-11-09 ENCOUNTER — APPOINTMENT (OUTPATIENT)
Dept: GENERAL RADIOLOGY | Facility: HOSPITAL | Age: 46
End: 2021-11-09

## 2021-11-09 ENCOUNTER — HOSPITAL ENCOUNTER (EMERGENCY)
Facility: HOSPITAL | Age: 46
Discharge: HOME | End: 2021-11-09
Attending: EMERGENCY MEDICINE | Admitting: EMERGENCY MEDICINE

## 2021-11-09 ENCOUNTER — APPOINTMENT (OUTPATIENT)
Dept: CT IMAGING | Facility: HOSPITAL | Age: 46
End: 2021-11-09

## 2021-11-09 VITALS
SYSTOLIC BLOOD PRESSURE: 100 MMHG | TEMPERATURE: 97.7 F | OXYGEN SATURATION: 94 % | DIASTOLIC BLOOD PRESSURE: 73 MMHG | BODY MASS INDEX: 21.12 KG/M2 | HEART RATE: 93 BPM | RESPIRATION RATE: 16 BRPM | HEIGHT: 68 IN | WEIGHT: 139.33 LBS

## 2021-11-09 DIAGNOSIS — R07.89 CHEST WALL PAIN: Primary | ICD-10-CM

## 2021-11-09 DIAGNOSIS — J44.1 COPD EXACERBATION (HCC): ICD-10-CM

## 2021-11-09 LAB
ALBUMIN SERPL-MCNC: 4.3 G/DL (ref 3.5–5.2)
ALBUMIN/GLOB SERPL: 1.9 G/DL
ALP SERPL-CCNC: 68 U/L (ref 39–117)
ALT SERPL W P-5'-P-CCNC: 21 U/L (ref 1–41)
ANION GAP SERPL CALCULATED.3IONS-SCNC: 9.7 MMOL/L (ref 5–15)
AST SERPL-CCNC: 23 U/L (ref 1–40)
BASOPHILS # BLD AUTO: 0.04 10*3/MM3 (ref 0–0.2)
BASOPHILS NFR BLD AUTO: 0.5 % (ref 0–1.5)
BILIRUB SERPL-MCNC: 0.2 MG/DL (ref 0–1.2)
BUN SERPL-MCNC: 15 MG/DL (ref 6–20)
BUN/CREAT SERPL: 13 (ref 7–25)
CALCIUM SPEC-SCNC: 9.2 MG/DL (ref 8.6–10.5)
CHLORIDE SERPL-SCNC: 111 MMOL/L (ref 98–107)
CO2 SERPL-SCNC: 23.3 MMOL/L (ref 22–29)
CREAT SERPL-MCNC: 1.15 MG/DL (ref 0.76–1.27)
DEPRECATED RDW RBC AUTO: 42.9 FL (ref 37–54)
EOSINOPHIL # BLD AUTO: 0.1 10*3/MM3 (ref 0–0.4)
EOSINOPHIL NFR BLD AUTO: 1.3 % (ref 0.3–6.2)
ERYTHROCYTE [DISTWIDTH] IN BLOOD BY AUTOMATED COUNT: 12.8 % (ref 12.3–15.4)
GFR SERPL CREATININE-BSD FRML MDRD: 68 ML/MIN/1.73
GLOBULIN UR ELPH-MCNC: 2.3 GM/DL
GLUCOSE SERPL-MCNC: 116 MG/DL (ref 65–99)
HCT VFR BLD AUTO: 44.7 % (ref 37.5–51)
HGB BLD-MCNC: 15.3 G/DL (ref 13–17.7)
HOLD SPECIMEN: NORMAL
HOLD SPECIMEN: NORMAL
IMM GRANULOCYTES # BLD AUTO: 0.03 10*3/MM3 (ref 0–0.05)
IMM GRANULOCYTES NFR BLD AUTO: 0.4 % (ref 0–0.5)
LYMPHOCYTES # BLD AUTO: 1.18 10*3/MM3 (ref 0.7–3.1)
LYMPHOCYTES NFR BLD AUTO: 15 % (ref 19.6–45.3)
MCH RBC QN AUTO: 31.5 PG (ref 26.6–33)
MCHC RBC AUTO-ENTMCNC: 34.2 G/DL (ref 31.5–35.7)
MCV RBC AUTO: 92.2 FL (ref 79–97)
MONOCYTES # BLD AUTO: 0.45 10*3/MM3 (ref 0.1–0.9)
MONOCYTES NFR BLD AUTO: 5.7 % (ref 5–12)
NEUTROPHILS NFR BLD AUTO: 6.06 10*3/MM3 (ref 1.7–7)
NEUTROPHILS NFR BLD AUTO: 77.1 % (ref 42.7–76)
NRBC BLD AUTO-RTO: 0 /100 WBC (ref 0–0.2)
NT-PROBNP SERPL-MCNC: 18.8 PG/ML (ref 0–450)
PLATELET # BLD AUTO: 224 10*3/MM3 (ref 140–450)
PMV BLD AUTO: 10.1 FL (ref 6–12)
POTASSIUM SERPL-SCNC: 4.2 MMOL/L (ref 3.5–5.2)
PROT SERPL-MCNC: 6.6 G/DL (ref 6–8.5)
QT INTERVAL: 339 MS
RBC # BLD AUTO: 4.85 10*6/MM3 (ref 4.14–5.8)
SODIUM SERPL-SCNC: 144 MMOL/L (ref 136–145)
TROPONIN T SERPL-MCNC: <0.01 NG/ML (ref 0–0.03)
WBC # BLD AUTO: 7.86 10*3/MM3 (ref 3.4–10.8)
WHOLE BLOOD HOLD SPECIMEN: NORMAL
WHOLE BLOOD HOLD SPECIMEN: NORMAL

## 2021-11-09 PROCEDURE — 93005 ELECTROCARDIOGRAM TRACING: CPT

## 2021-11-09 PROCEDURE — 85025 COMPLETE CBC W/AUTO DIFF WBC: CPT

## 2021-11-09 PROCEDURE — 93005 ELECTROCARDIOGRAM TRACING: CPT | Performed by: EMERGENCY MEDICINE

## 2021-11-09 PROCEDURE — 0 IOPAMIDOL PER 1 ML: Performed by: EMERGENCY MEDICINE

## 2021-11-09 PROCEDURE — 71275 CT ANGIOGRAPHY CHEST: CPT

## 2021-11-09 PROCEDURE — 36415 COLL VENOUS BLD VENIPUNCTURE: CPT

## 2021-11-09 PROCEDURE — 84484 ASSAY OF TROPONIN QUANT: CPT | Performed by: EMERGENCY MEDICINE

## 2021-11-09 PROCEDURE — 80053 COMPREHEN METABOLIC PANEL: CPT | Performed by: EMERGENCY MEDICINE

## 2021-11-09 PROCEDURE — 99283 EMERGENCY DEPT VISIT LOW MDM: CPT

## 2021-11-09 PROCEDURE — 93010 ELECTROCARDIOGRAM REPORT: CPT | Performed by: INTERNAL MEDICINE

## 2021-11-09 PROCEDURE — 71045 X-RAY EXAM CHEST 1 VIEW: CPT

## 2021-11-09 PROCEDURE — 83880 ASSAY OF NATRIURETIC PEPTIDE: CPT

## 2021-11-09 RX ORDER — PREDNISONE 20 MG/1
TABLET ORAL
Qty: 15 TABLET | Refills: 0 | Status: SHIPPED | OUTPATIENT
Start: 2021-11-09 | End: 2021-11-13 | Stop reason: HOSPADM

## 2021-11-09 RX ORDER — ALBUTEROL SULFATE 90 UG/1
2 AEROSOL, METERED RESPIRATORY (INHALATION) EVERY 4 HOURS PRN
Qty: 1 G | Refills: 0 | Status: SHIPPED | OUTPATIENT
Start: 2021-11-09

## 2021-11-09 RX ORDER — SODIUM CHLORIDE 0.9 % (FLUSH) 0.9 %
10 SYRINGE (ML) INJECTION AS NEEDED
Status: DISCONTINUED | OUTPATIENT
Start: 2021-11-09 | End: 2021-11-09 | Stop reason: HOSPADM

## 2021-11-09 RX ADMIN — IOPAMIDOL 100 ML: 755 INJECTION, SOLUTION INTRAVENOUS at 12:58

## 2021-11-09 NOTE — DISCHARGE INSTRUCTIONS
Drink plenty of fluids.  Do not smoke.  Take medications as directed.  Return for worsening symptoms.  Follow-up with your doctor in 2 days if no better.

## 2021-11-09 NOTE — ED NOTES
Pt presents with the complaint of SOA. Pt reports quite a respiratory hx, and that this weather has him in a flare up. Pt states that he just feel as if he is not getting enough o2. Pt was 90 on room air for RN, placed on 2L to bring him to 95%     Adelia Rausch RN  11/09/21 1002

## 2021-11-09 NOTE — ED PROVIDER NOTES
Subjective   This patient presents to the emergency room complaining of left chest pain.  The patient states that he woke up with left-sided chest pain which is sharp worse with inspiration and movement.  The patient states he had a pneumothorax in 2017 on the right side and this pain seems similar to that.  The patient has had no fever chills he has had a chronic cough which is associate with his bullous emphysema and COPD.  Patient continues to smoke.    The patient states that his primary care physician is Dr. PHYLLIS Friedman and also he sees Dr. ANTOINETTE Ayala for pulmonology.    The patient states that since he has been placed on oxygen his pain feels better and he has had no chest pressure no fever no vomiting no diarrhea.          Review of Systems   Constitutional: Negative.    HENT: Negative.    Eyes: Negative.    Respiratory: Positive for cough and shortness of breath. Negative for apnea, choking, chest tightness, wheezing and stridor.    Cardiovascular: Positive for chest pain. Negative for palpitations and leg swelling.   Gastrointestinal: Negative.    Endocrine: Negative.    Genitourinary: Negative.    Musculoskeletal: Negative.    Skin: Negative.    Allergic/Immunologic: Negative.    Neurological: Negative.    Hematological: Negative.    Psychiatric/Behavioral: Negative.        Past Medical History:   Diagnosis Date   • COPD (chronic obstructive pulmonary disease) (HCC)    • Pneumothorax        No Known Allergies    Past Surgical History:   Procedure Laterality Date   • CHEST TUBE INSERTION         History reviewed. No pertinent family history.    Social History     Socioeconomic History   • Marital status:    Tobacco Use   • Smoking status: Current Every Day Smoker     Packs/day: 1.00     Years: 33.00     Pack years: 33.00     Types: Cigarettes   • Smokeless tobacco: Never Used   Vaping Use   • Vaping Use: Never used   Substance and Sexual Activity   • Alcohol use: Not Currently   • Drug use: Yes      Types: Amphetamines, Marijuana     Comment: stopped using 2 years   • Sexual activity: Not Currently     Partners: Female           Objective   Physical Exam  Vitals and nursing note reviewed.   Constitutional:       Appearance: He is well-developed.   HENT:      Head: Normocephalic and atraumatic.      Mouth/Throat:      Mouth: Mucous membranes are moist.      Pharynx: Oropharynx is clear.   Eyes:      Extraocular Movements: Extraocular movements intact.      Pupils: Pupils are equal, round, and reactive to light.   Cardiovascular:      Rate and Rhythm: Regular rhythm. Tachycardia present.   Pulmonary:      Effort: Pulmonary effort is normal.      Breath sounds: Examination of the right-lower field reveals decreased breath sounds. Examination of the left-lower field reveals decreased breath sounds. Decreased breath sounds present.      Comments: Patient has prolonged expiratory phase bilaterally.  Chest:      Chest wall: No mass, deformity, tenderness, crepitus or edema. There is no dullness to percussion.   Abdominal:      General: Bowel sounds are normal.      Palpations: Abdomen is soft.   Musculoskeletal:         General: Normal range of motion.      Cervical back: Normal range of motion and neck supple.   Skin:     General: Skin is warm and dry.      Capillary Refill: Capillary refill takes less than 2 seconds.   Neurological:      General: No focal deficit present.      Mental Status: He is alert and oriented to person, place, and time.   Psychiatric:         Mood and Affect: Mood normal.         Behavior: Behavior normal.         Procedures           ED Course    Labs Reviewed   COMPREHENSIVE METABOLIC PANEL - Abnormal; Notable for the following components:       Result Value    Glucose 116 (*)     Chloride 111 (*)     All other components within normal limits    Narrative:     GFR Normal >60  Chronic Kidney Disease <60  Kidney Failure <15     CBC WITH AUTO DIFFERENTIAL - Abnormal; Notable for the following  components:    Neutrophil % 77.1 (*)     Lymphocyte % 15.0 (*)     All other components within normal limits   BNP (IN-HOUSE) - Normal    Narrative:     Among patients with dyspnea, NT-proBNP is highly sensitive for the detection of acute congestive heart failure. In addition NT-proBNP of <300 pg/ml effectively rules out acute congestive heart failure with 99% negative predictive value.    Results may be falsely decreased if patient taking Biotin.     TROPONIN (IN-HOUSE) - Normal    Narrative:     Troponin T Reference Range:  <= 0.03 ng/mL-   Negative for AMI  >0.03 ng/mL-     Abnormal for myocardial necrosis.  Clinicians would have to utilize clinical acumen, EKG, Troponin and serial changes to determine if it is an Acute Myocardial Infarction or myocardial injury due to an underlying chronic condition.       Results may be falsely decreased if patient taking Biotin.     RAINBOW DRAW    Narrative:     The following orders were created for panel order [facility] Draw.  Procedure                               Abnormality         Status                     ---------                               -----------         ------                     Green Top (Gel)[[id number]]                                  Final result               Lavender Top[[id number]]                                     Final result               Gold Top - SST[[id number]]                                   Final result               Light Blue Top[[id number]]                                   Final result                 Please view results for these tests on the individual orders.   CBC AND DIFFERENTIAL    Narrative:     The following orders were created for panel order CBC & Differential.  Procedure                               Abnormality         Status                     ---------                               -----------         ------                     CBC Auto Differential[[id number]]        Abnormal            Final result                 Please  view results for these tests on the individual orders.   GREEN TOP   LAVENDER TOP   GOLD TOP - SST   LIGHT BLUE TOP     XR Chest 2 View    Result Date: 10/26/2021  Narrative: PROCEDURE: XR CHEST 2 VW  COMPARISON: [facility], CR, XR CHEST PA AND LATERAL, 10/07/2021, 6:11[facility], CR, XR CHEST 2 VW, 10/01/2021, 6:23.  INDICATIONS: LEFT PNEUMOTHORAX, PATIENT STATES ABOUT 3-4 WEEKS AGO. STILL HAS SOME SHORTNESS OF BREATH. NO NEW COMPLAINTS TODAY.  FINDINGS:  Heart size within normal limits.  Emphysema with significant bullous disease.  No dense consolidation.  No pneumothorax.  The left basilar pneumothorax has resolved.  CONCLUSION: Emphysema with significant bullous disease.  No pneumothorax.     SHARON BOYCE MD       Electronically Signed and Approved By: SHARON BOYCE MD on 10/26/2021 at 15:28             XR Chest 1 View    Result Date: 11/9/2021  Narrative: PROCEDURE: XR CHEST 1 VW  COMPARISON: [facility], CR, XR CHEST 1 VW, 9/28/2021, 14:55.  [facility], CR, XR CHEST 1 VW, 10/06/2021, 4:28.  [facility], CR, CHEST PA/AP & LAT 2V, 8/08/2010, 5:48.  [facility], CT, CT CHEST WO CONTRAST DIAGNOSTIC, 9/29/2021, 14:16.  [facility], CR, XR CHEST 1 VW, 10/04/2021, 14:07.  [facility], CR, XR CHEST 1 VW, 10/05/2021, 2:20.  [facility], CT, CT CHEST WO CONTRAST DIAGNOSTIC, 10/05/2021, 3:00.  [facility], CR, XR CHEST PA AND LATERAL, 10/07/2021, 6:11.  [facility], CR, XR CHEST 2 VW, 10/26/2021, 14:44.  INDICATIONS: SOA Triage Protocol  FINDINGS:   Severe bullous emphysematous changes are present in the lung fields.  Questionable left basilar pneumothorax.  The heart and pulmonary vasculature are within normal limits.  No evidence of subcutaneous emphysema or mediastinal shift.  IMPRESSION: Severe bullous emphysema in the lung fields.  Questionable left basilar  pneumothorax.  No evidence of mediastinal shift.  Suggest a follow-up CT scan of the chest for further evaluation.  ARTHUR RIOS MD       Electronically Signed and Approved By: ARTHUR RIOS MD on 11/09/2021 at 11:19             CT Chest Pulmonary Embolism    Result Date: 11/9/2021  Narrative: PROCEDURE: CT CHEST PULMONARY EMBOLISM W CONTRAST  COMPARISON:  [facility], CT, CT CHEST WO CONTRAST DIAGNOSTIC, 10/05/2021, 3:00. INDICATIONS: L chest pain , tachycardia, shortness of breath and prior pneumothorax  TECHNIQUE: After obtaining the patient's consent, CT images were obtained with non-ionic intravenous contrast material.   PROTOCOL:   Standard imaging protocol performed    RADIATION:   DLP: 239.5mGy*cm   Automated exposure control was utilized to minimize radiation dose.  FINDINGS:  There is advanced bolus emphysematous lung disease.  There is no mediastinal or hilar lymphadenopathy.  There are no pulmonary artery filling defects to suggest the diagnosis of pulmonary embolism.  There is mild enlargement of the central pulmonary arteries.  The ascending aorta is mildly dilated measuring 3.7 cm.  This is unchanged from the patient's last scan.  The upper abdomen appears normal.   CONCLUSION:  1. Advanced bullous emphysematous lung disease 2. No evidence of pulmonary embolism 3. Mild ectasia of the ascending aorta with a maximal diameter 3.7 cm     Candelario Jay MD       Electronically Signed and Approved By: Candelario Jay MD on 11/09/2021 at 13:12                                                        MDM  Number of Diagnoses or Management Options     Amount and/or Complexity of Data Reviewed  Clinical lab tests: reviewed  Tests in the radiology section of CPT®: reviewed  Tests in the medicine section of CPT®: reviewed  Decide to obtain previous medical records or to obtain history from someone other than the patient: yes  Review and summarize past medical records: yes  Independent  visualization of images, tracings, or specimens: yes    Patient Progress  Patient progress: improved      Final diagnoses:   Chest wall pain   COPD exacerbation (HCC)       ED Disposition  ED Disposition     ED Disposition Condition Comment    Discharge Stable           Martin Friedman MD  [address]  [facility] [id number] [phone]    In 2 days  If no better         Medication List      New Prescriptions    predniSONE 20 MG tablet  Commonly known as: DELTASONE  Take 3 p.o. daily for 5 days.        Changed    * albuterol sulfate  (90 Base) MCG/ACT inhaler  Commonly known as: PROVENTIL HFA;VENTOLIN HFA;PROAIR HFA  Inhale 2 puffs Every 4 (Four) Hours As Needed for Wheezing or Shortness of Air.  What changed: Another medication with the same name was added. Make sure you understand how and when to take each.     * albuterol sulfate  (90 Base) MCG/ACT inhaler  Commonly known as: PROVENTIL HFA;VENTOLIN HFA;PROAIR HFA  Inhale 2 puffs Every 4 (Four) Hours As Needed for Wheezing.  What changed: You were already taking a medication with the same name, and this prescription was added. Make sure you understand how and when to take each.         * This list has 2 medication(s) that are the same as other medications prescribed for you. Read the directions carefully, and ask your doctor or other care provider to review them with you.               Where to Get Your Medications      These medications were sent to [id number] - [place], [place] - [address]  AT [facility] [address] - [phone]  - [phone]   [address] [address]    Phone: [phone]   · albuterol sulfate  (90 Base) MCG/ACT inhaler  · predniSONE 20 MG tablet          Carlos Perez DO  11/09/21 1916

## 2021-11-10 ENCOUNTER — HOSPITAL ENCOUNTER (INPATIENT)
Facility: HOSPITAL | Age: 46
LOS: 3 days | Discharge: HOME | End: 2021-11-13
Attending: EMERGENCY MEDICINE | Admitting: INTERNAL MEDICINE

## 2021-11-10 ENCOUNTER — APPOINTMENT (OUTPATIENT)
Dept: GENERAL RADIOLOGY | Facility: HOSPITAL | Age: 46
End: 2021-11-10

## 2021-11-10 DIAGNOSIS — J44.1 COPD EXACERBATION (HCC): ICD-10-CM

## 2021-11-10 DIAGNOSIS — J18.9 PNEUMONIA DUE TO INFECTIOUS ORGANISM, UNSPECIFIED LATERALITY, UNSPECIFIED PART OF LUNG: ICD-10-CM

## 2021-11-10 DIAGNOSIS — J96.01 ACUTE RESPIRATORY FAILURE WITH HYPOXIA (HCC): ICD-10-CM

## 2021-11-10 DIAGNOSIS — J44.1 COPD WITH ACUTE EXACERBATION (HCC): Primary | ICD-10-CM

## 2021-11-10 LAB
ALBUMIN SERPL-MCNC: 4.6 G/DL (ref 3.5–5.2)
ALBUMIN/GLOB SERPL: 1.9 G/DL
ALP SERPL-CCNC: 71 U/L (ref 39–117)
ALT SERPL W P-5'-P-CCNC: 26 U/L (ref 1–41)
ANION GAP SERPL CALCULATED.3IONS-SCNC: 16.3 MMOL/L (ref 5–15)
APTT PPP: 22.9 SECONDS (ref 22.2–34.2)
ARTERIAL PATENCY WRIST A: POSITIVE
AST SERPL-CCNC: 26 U/L (ref 1–40)
BASE EXCESS BLDA CALC-SCNC: -2.6 MMOL/L (ref -2–2)
BASOPHILS # BLD AUTO: 0.02 10*3/MM3 (ref 0–0.2)
BASOPHILS NFR BLD AUTO: 0.1 % (ref 0–1.5)
BDY SITE: ABNORMAL
BILIRUB SERPL-MCNC: 0.2 MG/DL (ref 0–1.2)
BUN SERPL-MCNC: 18 MG/DL (ref 6–20)
BUN/CREAT SERPL: 17.6 (ref 7–25)
CA-I BLDA-SCNC: 1.23 MMOL/L (ref 1.13–1.32)
CALCIUM SPEC-SCNC: 9.4 MG/DL (ref 8.6–10.5)
CHLORIDE BLDA-SCNC: 108 MMOL/L (ref 98–106)
CHLORIDE SERPL-SCNC: 105 MMOL/L (ref 98–107)
CO2 SERPL-SCNC: 19.7 MMOL/L (ref 22–29)
COHGB MFR BLD: 2.5 % (ref 0–1.5)
CREAT SERPL-MCNC: 1.02 MG/DL (ref 0.76–1.27)
D-LACTATE SERPL-SCNC: 1.2 MMOL/L (ref 0.5–2)
D-LACTATE SERPL-SCNC: 3.8 MMOL/L (ref 0.5–2)
DEPRECATED RDW RBC AUTO: 42.5 FL (ref 37–54)
EOSINOPHIL # BLD AUTO: 0.01 10*3/MM3 (ref 0–0.4)
EOSINOPHIL NFR BLD AUTO: 0.1 % (ref 0.3–6.2)
ERYTHROCYTE [DISTWIDTH] IN BLOOD BY AUTOMATED COUNT: 12.9 % (ref 12.3–15.4)
FHHB: 9.5 % (ref 0–5)
FLUAV AG NPH QL: NEGATIVE
FLUBV AG NPH QL IA: NEGATIVE
GAS FLOW AIRWAY: 4.5 LPM
GFR SERPL CREATININE-BSD FRML MDRD: 78 ML/MIN/1.73
GLOBULIN UR ELPH-MCNC: 2.4 GM/DL
GLUCOSE BLDA-MCNC: 132 MMOL/L (ref 70–99)
GLUCOSE SERPL-MCNC: 157 MG/DL (ref 65–99)
HCO3 BLDA-SCNC: 21 MMOL/L (ref 22–26)
HCT VFR BLD AUTO: 42 % (ref 37.5–51)
HGB BLD-MCNC: 14.4 G/DL (ref 13–17.7)
HGB BLDA-MCNC: 14.7 G/DL (ref 13.8–16.4)
HOLD SPECIMEN: NORMAL
HOLD SPECIMEN: NORMAL
IMM GRANULOCYTES # BLD AUTO: 0.05 10*3/MM3 (ref 0–0.05)
IMM GRANULOCYTES NFR BLD AUTO: 0.4 % (ref 0–0.5)
INHALED O2 CONCENTRATION: 38 %
INR PPP: 0.99 (ref 2–3)
LACTATE BLDA-SCNC: 2.17 MMOL/L (ref 0.5–2)
LYMPHOCYTES # BLD AUTO: 0.43 10*3/MM3 (ref 0.7–3.1)
LYMPHOCYTES NFR BLD AUTO: 3.2 % (ref 19.6–45.3)
MAGNESIUM SERPL-MCNC: 1.8 MG/DL (ref 1.6–2.6)
MCH RBC QN AUTO: 31.1 PG (ref 26.6–33)
MCHC RBC AUTO-ENTMCNC: 34.3 G/DL (ref 31.5–35.7)
MCV RBC AUTO: 90.7 FL (ref 79–97)
METHGB BLD QL: 0.2 % (ref 0–1.5)
MODALITY: ABNORMAL
MONOCYTES # BLD AUTO: 0.45 10*3/MM3 (ref 0.1–0.9)
MONOCYTES NFR BLD AUTO: 3.3 % (ref 5–12)
NEUTROPHILS NFR BLD AUTO: 12.61 10*3/MM3 (ref 1.7–7)
NEUTROPHILS NFR BLD AUTO: 92.9 % (ref 42.7–76)
NOTE: ABNORMAL
NRBC BLD AUTO-RTO: 0 /100 WBC (ref 0–0.2)
NT-PROBNP SERPL-MCNC: 83.6 PG/ML (ref 0–450)
OXYHGB MFR BLDV: 87.8 % (ref 94–99)
PCO2 BLDA: 33.3 MM HG (ref 35–45)
PH BLDA: 7.42 PH UNITS (ref 7.35–7.45)
PLATELET # BLD AUTO: 235 10*3/MM3 (ref 140–450)
PMV BLD AUTO: 10.2 FL (ref 6–12)
PO2 BLD: 155 MM[HG] (ref 0–500)
PO2 BLDA: 58.9 MM HG (ref 80–100)
POTASSIUM BLDA-SCNC: 4.21 MMOL/L (ref 3.5–5)
POTASSIUM SERPL-SCNC: 4.2 MMOL/L (ref 3.5–5.2)
PROT SERPL-MCNC: 7 G/DL (ref 6–8.5)
PROTHROMBIN TIME: 10.4 SECONDS (ref 9.4–12)
QT INTERVAL: 335 MS
RBC # BLD AUTO: 4.63 10*6/MM3 (ref 4.14–5.8)
SAO2 % BLDCOA: 90.2 % (ref 95–99)
SARS-COV-2 N GENE RESP QL NAA+PROBE: NOT DETECTED
SODIUM BLDA-SCNC: 138 MMOL/L (ref 136–146)
SODIUM SERPL-SCNC: 141 MMOL/L (ref 136–145)
TROPONIN T SERPL-MCNC: <0.01 NG/ML (ref 0–0.03)
WBC # BLD AUTO: 13.57 10*3/MM3 (ref 3.4–10.8)
WHOLE BLOOD HOLD SPECIMEN: NORMAL
WHOLE BLOOD HOLD SPECIMEN: NORMAL

## 2021-11-10 PROCEDURE — 93005 ELECTROCARDIOGRAM TRACING: CPT | Performed by: EMERGENCY MEDICINE

## 2021-11-10 PROCEDURE — 94799 UNLISTED PULMONARY SVC/PX: CPT

## 2021-11-10 PROCEDURE — 85730 THROMBOPLASTIN TIME PARTIAL: CPT | Performed by: EMERGENCY MEDICINE

## 2021-11-10 PROCEDURE — 83735 ASSAY OF MAGNESIUM: CPT | Performed by: EMERGENCY MEDICINE

## 2021-11-10 PROCEDURE — 82375 ASSAY CARBOXYHB QUANT: CPT | Performed by: EMERGENCY MEDICINE

## 2021-11-10 PROCEDURE — 71045 X-RAY EXAM CHEST 1 VIEW: CPT

## 2021-11-10 PROCEDURE — 25010000002 METHYLPREDNISOLONE PER 125 MG: Performed by: INTERNAL MEDICINE

## 2021-11-10 PROCEDURE — 85025 COMPLETE CBC W/AUTO DIFF WBC: CPT | Performed by: EMERGENCY MEDICINE

## 2021-11-10 PROCEDURE — 84484 ASSAY OF TROPONIN QUANT: CPT | Performed by: EMERGENCY MEDICINE

## 2021-11-10 PROCEDURE — 94760 N-INVAS EAR/PLS OXIMETRY 1: CPT

## 2021-11-10 PROCEDURE — 99285 EMERGENCY DEPT VISIT HI MDM: CPT

## 2021-11-10 PROCEDURE — 25010000002 VANCOMYCIN 5 G RECONSTITUTED SOLUTION: Performed by: EMERGENCY MEDICINE

## 2021-11-10 PROCEDURE — 82805 BLOOD GASES W/O2 SATURATION: CPT | Performed by: EMERGENCY MEDICINE

## 2021-11-10 PROCEDURE — 83605 ASSAY OF LACTIC ACID: CPT | Performed by: EMERGENCY MEDICINE

## 2021-11-10 PROCEDURE — 80053 COMPREHEN METABOLIC PANEL: CPT | Performed by: EMERGENCY MEDICINE

## 2021-11-10 PROCEDURE — 25010000002 METHYLPREDNISOLONE PER 125 MG: Performed by: EMERGENCY MEDICINE

## 2021-11-10 PROCEDURE — 94640 AIRWAY INHALATION TREATMENT: CPT

## 2021-11-10 PROCEDURE — 36415 COLL VENOUS BLD VENIPUNCTURE: CPT

## 2021-11-10 PROCEDURE — 87804 INFLUENZA ASSAY W/OPTIC: CPT | Performed by: EMERGENCY MEDICINE

## 2021-11-10 PROCEDURE — 87040 BLOOD CULTURE FOR BACTERIA: CPT | Performed by: EMERGENCY MEDICINE

## 2021-11-10 PROCEDURE — 93010 ELECTROCARDIOGRAM REPORT: CPT | Performed by: SPECIALIST

## 2021-11-10 PROCEDURE — 25010000002 ENOXAPARIN PER 10 MG: Performed by: INTERNAL MEDICINE

## 2021-11-10 PROCEDURE — 83050 HGB METHEMOGLOBIN QUAN: CPT | Performed by: EMERGENCY MEDICINE

## 2021-11-10 PROCEDURE — 93005 ELECTROCARDIOGRAM TRACING: CPT

## 2021-11-10 PROCEDURE — 85610 PROTHROMBIN TIME: CPT | Performed by: EMERGENCY MEDICINE

## 2021-11-10 PROCEDURE — 25010000002 CEFEPIME PER 500 MG: Performed by: EMERGENCY MEDICINE

## 2021-11-10 PROCEDURE — 87635 SARS-COV-2 COVID-19 AMP PRB: CPT | Performed by: EMERGENCY MEDICINE

## 2021-11-10 PROCEDURE — 36600 WITHDRAWAL OF ARTERIAL BLOOD: CPT | Performed by: EMERGENCY MEDICINE

## 2021-11-10 PROCEDURE — 83880 ASSAY OF NATRIURETIC PEPTIDE: CPT | Performed by: EMERGENCY MEDICINE

## 2021-11-10 RX ORDER — IPRATROPIUM BROMIDE AND ALBUTEROL SULFATE 2.5; .5 MG/3ML; MG/3ML
3 SOLUTION RESPIRATORY (INHALATION)
Status: DISCONTINUED | OUTPATIENT
Start: 2021-11-10 | End: 2021-11-11

## 2021-11-10 RX ORDER — BISACODYL 10 MG
10 SUPPOSITORY, RECTAL RECTAL DAILY PRN
Status: DISCONTINUED | OUTPATIENT
Start: 2021-11-10 | End: 2021-11-13 | Stop reason: HOSPADM

## 2021-11-10 RX ORDER — ONDANSETRON 2 MG/ML
4 INJECTION INTRAMUSCULAR; INTRAVENOUS EVERY 6 HOURS PRN
Status: DISCONTINUED | OUTPATIENT
Start: 2021-11-10 | End: 2021-11-13 | Stop reason: HOSPADM

## 2021-11-10 RX ORDER — METHYLPREDNISOLONE SODIUM SUCCINATE 125 MG/2ML
60 INJECTION, POWDER, LYOPHILIZED, FOR SOLUTION INTRAMUSCULAR; INTRAVENOUS EVERY 8 HOURS
Status: COMPLETED | OUTPATIENT
Start: 2021-11-10 | End: 2021-11-11

## 2021-11-10 RX ORDER — ALUMINA, MAGNESIA, AND SIMETHICONE 2400; 2400; 240 MG/30ML; MG/30ML; MG/30ML
15 SUSPENSION ORAL EVERY 6 HOURS PRN
Status: DISCONTINUED | OUTPATIENT
Start: 2021-11-10 | End: 2021-11-13 | Stop reason: HOSPADM

## 2021-11-10 RX ORDER — ALPRAZOLAM 0.25 MG/1
0.5 TABLET ORAL EVERY 8 HOURS PRN
Status: DISCONTINUED | OUTPATIENT
Start: 2021-11-10 | End: 2021-11-13 | Stop reason: HOSPADM

## 2021-11-10 RX ORDER — CHOLECALCIFEROL (VITAMIN D3) 125 MCG
5 CAPSULE ORAL NIGHTLY PRN
Status: DISCONTINUED | OUTPATIENT
Start: 2021-11-10 | End: 2021-11-13 | Stop reason: HOSPADM

## 2021-11-10 RX ORDER — ACETAMINOPHEN 650 MG/1
650 SUPPOSITORY RECTAL EVERY 4 HOURS PRN
Status: DISCONTINUED | OUTPATIENT
Start: 2021-11-10 | End: 2021-11-13 | Stop reason: HOSPADM

## 2021-11-10 RX ORDER — BISACODYL 5 MG/1
5 TABLET, DELAYED RELEASE ORAL DAILY PRN
Status: DISCONTINUED | OUTPATIENT
Start: 2021-11-10 | End: 2021-11-13 | Stop reason: HOSPADM

## 2021-11-10 RX ORDER — HYDROCODONE BITARTRATE AND ACETAMINOPHEN 10; 325 MG/1; MG/1
1 TABLET ORAL EVERY 4 HOURS PRN
Status: DISCONTINUED | OUTPATIENT
Start: 2021-11-10 | End: 2021-11-13 | Stop reason: HOSPADM

## 2021-11-10 RX ORDER — METHYLPREDNISOLONE SODIUM SUCCINATE 125 MG/2ML
125 INJECTION, POWDER, LYOPHILIZED, FOR SOLUTION INTRAMUSCULAR; INTRAVENOUS ONCE
Status: COMPLETED | OUTPATIENT
Start: 2021-11-10 | End: 2021-11-10

## 2021-11-10 RX ORDER — ARFORMOTEROL TARTRATE 15 UG/2ML
15 SOLUTION RESPIRATORY (INHALATION)
Status: DISCONTINUED | OUTPATIENT
Start: 2021-11-10 | End: 2021-11-13 | Stop reason: HOSPADM

## 2021-11-10 RX ORDER — ACETAMINOPHEN 325 MG/1
650 TABLET ORAL EVERY 4 HOURS PRN
Status: DISCONTINUED | OUTPATIENT
Start: 2021-11-10 | End: 2021-11-13 | Stop reason: HOSPADM

## 2021-11-10 RX ORDER — AMOXICILLIN 250 MG
2 CAPSULE ORAL 2 TIMES DAILY
Status: DISCONTINUED | OUTPATIENT
Start: 2021-11-10 | End: 2021-11-13 | Stop reason: HOSPADM

## 2021-11-10 RX ORDER — IPRATROPIUM BROMIDE AND ALBUTEROL SULFATE 2.5; .5 MG/3ML; MG/3ML
3 SOLUTION RESPIRATORY (INHALATION) ONCE
Status: COMPLETED | OUTPATIENT
Start: 2021-11-10 | End: 2021-11-10

## 2021-11-10 RX ORDER — POLYETHYLENE GLYCOL 3350 17 G/17G
17 POWDER, FOR SOLUTION ORAL DAILY PRN
Status: DISCONTINUED | OUTPATIENT
Start: 2021-11-10 | End: 2021-11-13 | Stop reason: HOSPADM

## 2021-11-10 RX ORDER — HYDROCODONE BITARTRATE AND ACETAMINOPHEN 5; 325 MG/1; MG/1
1 TABLET ORAL EVERY 4 HOURS PRN
Status: DISCONTINUED | OUTPATIENT
Start: 2021-11-10 | End: 2021-11-13 | Stop reason: HOSPADM

## 2021-11-10 RX ORDER — FAMOTIDINE 20 MG/1
40 TABLET, FILM COATED ORAL DAILY
Status: DISCONTINUED | OUTPATIENT
Start: 2021-11-10 | End: 2021-11-13 | Stop reason: HOSPADM

## 2021-11-10 RX ORDER — SODIUM CHLORIDE 0.9 % (FLUSH) 0.9 %
10 SYRINGE (ML) INJECTION AS NEEDED
Status: DISCONTINUED | OUTPATIENT
Start: 2021-11-10 | End: 2021-11-13 | Stop reason: HOSPADM

## 2021-11-10 RX ORDER — ACETAMINOPHEN 160 MG/5ML
650 SOLUTION ORAL EVERY 4 HOURS PRN
Status: DISCONTINUED | OUTPATIENT
Start: 2021-11-10 | End: 2021-11-13 | Stop reason: HOSPADM

## 2021-11-10 RX ORDER — NALOXONE HCL 0.4 MG/ML
0.4 VIAL (ML) INJECTION
Status: DISCONTINUED | OUTPATIENT
Start: 2021-11-10 | End: 2021-11-13 | Stop reason: HOSPADM

## 2021-11-10 RX ORDER — CEFEPIME 1 G/50ML
2 INJECTION, SOLUTION INTRAVENOUS ONCE
Status: COMPLETED | OUTPATIENT
Start: 2021-11-10 | End: 2021-11-10

## 2021-11-10 RX ADMIN — FAMOTIDINE 40 MG: 20 TABLET, FILM COATED ORAL at 08:48

## 2021-11-10 RX ADMIN — CEFEPIME 2 G: 1 INJECTION, SOLUTION INTRAVENOUS at 04:57

## 2021-11-10 RX ADMIN — IPRATROPIUM BROMIDE AND ALBUTEROL SULFATE 3 ML: 2.5; .5 SOLUTION RESPIRATORY (INHALATION) at 04:26

## 2021-11-10 RX ADMIN — ARFORMOTEROL TARTRATE 15 MCG: 15 SOLUTION RESPIRATORY (INHALATION) at 18:52

## 2021-11-10 RX ADMIN — METHYLPREDNISOLONE SODIUM SUCCINATE 125 MG: 125 INJECTION, POWDER, FOR SOLUTION INTRAMUSCULAR; INTRAVENOUS at 04:23

## 2021-11-10 RX ADMIN — ENOXAPARIN SODIUM 30 MG: 30 INJECTION SUBCUTANEOUS at 12:09

## 2021-11-10 RX ADMIN — IPRATROPIUM BROMIDE AND ALBUTEROL SULFATE 3 ML: .5; 2.5 SOLUTION RESPIRATORY (INHALATION) at 18:52

## 2021-11-10 RX ADMIN — IPRATROPIUM BROMIDE AND ALBUTEROL SULFATE 3 ML: .5; 2.5 SOLUTION RESPIRATORY (INHALATION) at 16:13

## 2021-11-10 RX ADMIN — METHYLPREDNISOLONE SODIUM SUCCINATE 60 MG: 125 INJECTION, POWDER, FOR SOLUTION INTRAMUSCULAR; INTRAVENOUS at 15:38

## 2021-11-10 RX ADMIN — DOCUSATE SODIUM 50MG AND SENNOSIDES 8.6MG 2 TABLET: 8.6; 5 TABLET, FILM COATED ORAL at 08:48

## 2021-11-10 RX ADMIN — METHYLPREDNISOLONE SODIUM SUCCINATE 60 MG: 125 INJECTION, POWDER, FOR SOLUTION INTRAMUSCULAR; INTRAVENOUS at 08:48

## 2021-11-10 RX ADMIN — VANCOMYCIN HYDROCHLORIDE 1250 MG: 5 INJECTION, POWDER, LYOPHILIZED, FOR SOLUTION INTRAVENOUS at 05:29

## 2021-11-10 NOTE — PLAN OF CARE
Goal Outcome Evaluation: Patient admitted from ED today. Rests comfortably on 2L NC. Wheezing has improved. Will continue to monitor.

## 2021-11-10 NOTE — ED NOTES
PT CALLED EMS FOR SOA, PT WAS 74% ON EMS ARRIVAL. THEY GAVE HIM A BREATHING TREATMENT AND PT O2 SATURATION INCREASED TO 98%. PT ROOM AIR SAURATION UPON ARRIVAL WAS 75%, ON 5L PT ALYCIA TO 92     Jean-Pierre Will RN  11/10/21 0259

## 2021-11-10 NOTE — ED PROVIDER NOTES
Time: 3:11 AM EST  Arrived by: ambulance  Chief Complaint:   Chief Complaint   Patient presents with   • Shortness of Breath     History provided by: patient  History is limited by: N/A     History of Present Illness:  Patient is a 46 y.o. year old male that presents to the emergency department with shortness of breath that started this morning. Pt has had a dry cough and mild chest pain.     Pt is not vaccinated for COVID-19.   Pt is not on home oxygen.   Pt has a history of COPD.       Shortness of Breath  Severity:  Mild  Onset quality:  Gradual  Duration:  1 day  Timing:  Constant  Progression:  Worsening  Chronicity:  New  Relieved by: breathing treatment.  Worsened by:  Nothing  Associated symptoms: chest pain and cough    Associated symptoms: no fever, no neck pain, no rash, no sputum production and no vomiting        Similar Symptoms Previously: no  Recently seen: yes      Patient Care Team  Primary Care Provider: Martin Friedman MD  Pulmonologist: ANTOINETTE Ayala    Past Medical History:     No Known Allergies  Past Medical History:   Diagnosis Date   • COPD (chronic obstructive pulmonary disease) (HCC)    • Empyema (HCC)    • Pneumothorax    • Pulmonary fibrosis (HCC)      Past Surgical History:   Procedure Laterality Date   • CHEST TUBE INSERTION       History reviewed. No pertinent family history.    Home Medications:  Prior to Admission medications    Medication Sig Start Date End Date Taking? Authorizing Provider   albuterol sulfate  (90 Base) MCG/ACT inhaler Inhale 2 puffs Every 4 (Four) Hours As Needed for Wheezing or Shortness of Air. 10/1/21   Mary Carter MD   albuterol sulfate  (90 Base) MCG/ACT inhaler Inhale 2 puffs Every 4 (Four) Hours As Needed for Wheezing. 11/9/21   Carlos Perez, DO   predniSONE (DELTASONE) 20 MG tablet Take 3 p.o. daily for 5 days. 11/9/21   Carlos Perez, DO   tiotropium bromide-olodaterol (Stiolto Respimat) 2.5-2.5 MCG/ACT aerosol  "solution inhaler Inhale 2 puffs Daily for 30 days. 10/26/21 11/25/21  Salina Dixon APRN        Social History:   Social History     Tobacco Use   • Smoking status: Current Every Day Smoker     Packs/day: 0.50     Years: 33.00     Pack years: 16.50     Types: Cigarettes   • Smokeless tobacco: Never Used   Vaping Use   • Vaping Use: Never used   Substance Use Topics   • Alcohol use: Not Currently   • Drug use: Yes     Types: Amphetamines, Marijuana     Comment: stopped using 2 years     Recent travel: no     Review of Systems:  Review of Systems   Constitutional: Negative for chills and fever.   HENT: Negative for nosebleeds.    Eyes: Negative for redness.   Respiratory: Positive for cough and shortness of breath. Negative for sputum production.    Cardiovascular: Positive for chest pain.   Gastrointestinal: Negative for diarrhea and vomiting.   Genitourinary: Negative for dysuria and frequency.   Musculoskeletal: Negative for back pain and neck pain.   Skin: Negative for rash.   Neurological: Negative for seizures.        Physical Exam:  /74   Pulse 103   Temp 98.4 °F (36.9 °C) (Oral)   Resp 17   Ht 172.7 cm (68\")   Wt 62.8 kg (138 lb 7.2 oz)   SpO2 95%   BMI 21.05 kg/m²     Physical Exam  Vitals and nursing note reviewed.   Constitutional:       General: He is not in acute distress.     Appearance: Normal appearance. He is not toxic-appearing.      Interventions: Nasal cannula in place.   HENT:      Head: Normocephalic and atraumatic.      Nose: Nose normal.      Mouth/Throat:      Mouth: Mucous membranes are moist.   Eyes:      General: Lids are normal. No scleral icterus.     Conjunctiva/sclera: Conjunctivae normal.   Cardiovascular:      Rate and Rhythm: Regular rhythm. Tachycardia present.      Pulses: Normal pulses.      Heart sounds: Normal heart sounds. No murmur heard.      Pulmonary:      Effort: Pulmonary effort is normal. No respiratory distress.      Breath sounds: Examination of the " right-upper field reveals decreased breath sounds. Examination of the left-upper field reveals decreased breath sounds. Examination of the right-middle field reveals decreased breath sounds. Examination of the left-middle field reveals decreased breath sounds. Examination of the right-lower field reveals decreased breath sounds. Examination of the left-lower field reveals decreased breath sounds. Decreased breath sounds present. No wheezing, rhonchi or rales.   Chest:      Chest wall: No tenderness.   Abdominal:      Palpations: Abdomen is soft.      Tenderness: There is no abdominal tenderness. There is no guarding or rebound.   Musculoskeletal:         General: No tenderness. Normal range of motion.      Cervical back: Normal range of motion and neck supple.      Right lower leg: No edema.      Left lower leg: No edema.   Skin:     General: Skin is warm and dry.      Findings: No rash.   Neurological:      Mental Status: He is alert and oriented to person, place, and time. Mental status is at baseline.      Sensory: Sensation is intact.      Motor: Motor function is intact.   Psychiatric:         Behavior: Behavior normal.                Medications in the Emergency Department:  Medications   sodium chloride 0.9 % flush 10 mL (has no administration in time range)   ipratropium-albuterol (DUO-NEB) nebulizer solution 3 mL (3 mL Nebulization Given 11/10/21 0426)   methylPREDNISolone sodium succinate (SOLU-Medrol) injection 125 mg (125 mg Intravenous Given 11/10/21 0423)   vancomycin 1250 mg/250 mL 0.9% NS IVPB ([facility]) (1,250 mg Intravenous New Bag 11/10/21 0529)   cefepime (MAXIPIME) IVPB 2 g (premix) in 0.9% NaCl (0 g Intravenous Stopped 11/10/21 0528)        Labs  Lab Results (last 24 hours)     Procedure Component Value Units Date/Time    CBC & Differential [[id number]]  (Abnormal) Collected: 11/09/21 1038    Specimen: Blood from Arm, Left Updated: 11/09/21 1052    Narrative:      The following orders were created  for panel order CBC & Differential.  Procedure                               Abnormality         Status                     ---------                               -----------         ------                     CBC Auto Differential[[id number]]        Abnormal            Final result                 Please view results for these tests on the individual orders.    Comprehensive Metabolic Panel [[id number]]  (Abnormal) Collected: 11/09/21 1038    Specimen: Blood from Arm, Left Updated: 11/09/21 1121     Glucose 116 mg/dL      BUN 15 mg/dL      Creatinine 1.15 mg/dL      Sodium 144 mmol/L      Potassium 4.2 mmol/L      Chloride 111 mmol/L      CO2 23.3 mmol/L      Calcium 9.2 mg/dL      Total Protein 6.6 g/dL      Albumin 4.30 g/dL      ALT (SGPT) 21 U/L      AST (SGOT) 23 U/L      Alkaline Phosphatase 68 U/L      Total Bilirubin 0.2 mg/dL      eGFR Non African Amer 68 mL/min/1.73      Globulin 2.3 gm/dL      A/G Ratio 1.9 g/dL      BUN/Creatinine Ratio 13.0     Anion Gap 9.7 mmol/L     Narrative:      GFR Normal >60  Chronic Kidney Disease <60  Kidney Failure <15      BNP [[id number]]  (Normal) Collected: 11/09/21 1038    Specimen: Blood from Arm, Left Updated: 11/09/21 1114     proBNP 18.8 pg/mL     Narrative:      Among patients with dyspnea, NT-proBNP is highly sensitive for the detection of acute congestive heart failure. In addition NT-proBNP of <300 pg/ml effectively rules out acute congestive heart failure with 99% negative predictive value.    Results may be falsely decreased if patient taking Biotin.      Troponin [[id number]]  (Normal) Collected: 11/09/21 1038    Specimen: Blood from Arm, Left Updated: 11/09/21 1121     Troponin T <0.010 ng/mL     Narrative:      Troponin T Reference Range:  <= 0.03 ng/mL-   Negative for AMI  >0.03 ng/mL-     Abnormal for myocardial necrosis.  Clinicians would have to utilize clinical acumen, EKG, Troponin and serial changes to determine if it is an Acute Myocardial Infarction  or myocardial injury due to an underlying chronic condition.       Results may be falsely decreased if patient taking Biotin.      CBC Auto Differential [[id number]]  (Abnormal) Collected: 11/09/21 1038    Specimen: Blood from Arm, Left Updated: 11/09/21 1052     WBC 7.86 10*3/mm3      RBC 4.85 10*6/mm3      Hemoglobin 15.3 g/dL      Hematocrit 44.7 %      MCV 92.2 fL      MCH 31.5 pg      MCHC 34.2 g/dL      RDW 12.8 %      RDW-SD 42.9 fl      MPV 10.1 fL      Platelets 224 10*3/mm3      Neutrophil % 77.1 %      Lymphocyte % 15.0 %      Monocyte % 5.7 %      Eosinophil % 1.3 %      Basophil % 0.5 %      Immature Grans % 0.4 %      Neutrophils, Absolute 6.06 10*3/mm3      Lymphocytes, Absolute 1.18 10*3/mm3      Monocytes, Absolute 0.45 10*3/mm3      Eosinophils, Absolute 0.10 10*3/mm3      Basophils, Absolute 0.04 10*3/mm3      Immature Grans, Absolute 0.03 10*3/mm3      nRBC 0.0 /100 WBC     CBC & Differential [[id number]]  (Abnormal) Collected: 11/10/21 0300    Specimen: Blood Updated: 11/10/21 0310    Narrative:      The following orders were created for panel order CBC & Differential.  Procedure                               Abnormality         Status                     ---------                               -----------         ------                     CBC Auto Differential[[id number]]        Abnormal            Final result                 Please view results for these tests on the individual orders.    Comprehensive Metabolic Panel [[id number]]  (Abnormal) Collected: 11/10/21 0300    Specimen: Blood Updated: 11/10/21 0340     Glucose 157 mg/dL      BUN 18 mg/dL      Creatinine 1.02 mg/dL      Sodium 141 mmol/L      Potassium 4.2 mmol/L      Chloride 105 mmol/L      CO2 19.7 mmol/L      Calcium 9.4 mg/dL      Total Protein 7.0 g/dL      Albumin 4.60 g/dL      ALT (SGPT) 26 U/L      AST (SGOT) 26 U/L      Alkaline Phosphatase 71 U/L      Total Bilirubin 0.2 mg/dL      eGFR Non African Amer 78 mL/min/1.73       Globulin 2.4 gm/dL      A/G Ratio 1.9 g/dL      BUN/Creatinine Ratio 17.6     Anion Gap 16.3 mmol/L     Narrative:      GFR Normal >60  Chronic Kidney Disease <60  Kidney Failure <15      BNP [[id number]]  (Normal) Collected: 11/10/21 0300    Specimen: Blood Updated: 11/10/21 0339     proBNP 83.6 pg/mL     Narrative:      Among patients with dyspnea, NT-proBNP is highly sensitive for the detection of acute congestive heart failure. In addition NT-proBNP of <300 pg/ml effectively rules out acute congestive heart failure with 99% negative predictive value.    Results may be falsely decreased if patient taking Biotin.      Troponin [[id number]]  (Normal) Collected: 11/10/21 0300    Specimen: Blood Updated: 11/10/21 0340     Troponin T <0.010 ng/mL     Narrative:      Troponin T Reference Range:  <= 0.03 ng/mL-   Negative for AMI  >0.03 ng/mL-     Abnormal for myocardial necrosis.  Clinicians would have to utilize clinical acumen, EKG, Troponin and serial changes to determine if it is an Acute Myocardial Infarction or myocardial injury due to an underlying chronic condition.       Results may be falsely decreased if patient taking Biotin.      CBC Auto Differential [[id number]]  (Abnormal) Collected: 11/10/21 0300    Specimen: Blood Updated: 11/10/21 0310     WBC 13.57 10*3/mm3      RBC 4.63 10*6/mm3      Hemoglobin 14.4 g/dL      Hematocrit 42.0 %      MCV 90.7 fL      MCH 31.1 pg      MCHC 34.3 g/dL      RDW 12.9 %      RDW-SD 42.5 fl      MPV 10.2 fL      Platelets 235 10*3/mm3      Neutrophil % 92.9 %      Lymphocyte % 3.2 %      Monocyte % 3.3 %      Eosinophil % 0.1 %      Basophil % 0.1 %      Immature Grans % 0.4 %      Neutrophils, Absolute 12.61 10*3/mm3      Lymphocytes, Absolute 0.43 10*3/mm3      Monocytes, Absolute 0.45 10*3/mm3      Eosinophils, Absolute 0.01 10*3/mm3      Basophils, Absolute 0.02 10*3/mm3      Immature Grans, Absolute 0.05 10*3/mm3      nRBC 0.0 /100 WBC     Magnesium [[id number]]  (Normal)  Collected: 11/10/21 0300    Specimen: Blood Updated: 11/10/21 0345     Magnesium 1.8 mg/dL     COVID-19,CEPHEID/AHMET/BDMAX,COR/LEONARD/PAD/ELAN IN-HOUSE(OR EMERGENT/ADD-ON),NP SWAB IN TRANSPORT MEDIA 3-4 HR TAT, RT-PCR - Swab, Nasopharynx [[id number]] Collected: 11/10/21 0322    Specimen: Swab from Nasopharynx Updated: 11/10/21 0325    Influenza Antigen, Rapid - Swab, Nasopharynx [[id number]]  (Normal) Collected: 11/10/21 0322    Specimen: Swab from Nasopharynx Updated: 11/10/21 0350     Influenza A Ag, EIA Negative     Influenza B Ag, EIA Negative    Lactic Acid, Plasma [[id number]]  (Abnormal) Collected: 11/10/21 0326    Specimen: Blood from Arm, Left Updated: 11/10/21 0437     Lactate 3.8 mmol/L     Blood Culture - Blood, Arm, Left [[id number]] Collected: 11/10/21 0326    Specimen: Blood from Arm, Left Updated: 11/10/21 0345    Protime-INR [[id number]]  (Abnormal) Collected: 11/10/21 0408    Specimen: Blood Updated: 11/10/21 0422     Protime 10.4 Seconds      INR 0.99    Narrative:      Suggested Therapeutic Ranges For Oral Anticoagulant Therapy:  Level of Therapy                      INR Target Range  Standard Dose                            2.0-3.0  High Dose                                2.5-3.5  Patients not receiving anticoagulant  Therapy Normal Range                     0.6-1.2    aPTT [[id number]]  (Normal) Collected: 11/10/21 0408    Specimen: Blood Updated: 11/10/21 0422     PTT 22.9 seconds     ABG with Co-Ox and Electrolytes [[id number]]  (Abnormal) Collected: 11/10/21 0457    Specimen: Arterial Blood from Arm, Right Updated: 11/10/21 0458     pH, Arterial 7.418 pH units      pCO2, Arterial 33.3 mm Hg      pO2, Arterial 58.9 mm Hg      HCO3, Arterial 21.0 mmol/L      Base Excess, Arterial -2.6 mmol/L      O2 Saturation, Arterial 90.2 %      Hemoglobin, Blood Gas 14.7 g/dL      Carboxyhemoglobin 2.5 %      Methemoglobin 0.20 %      Oxyhemoglobin 87.8 %      FHHB 9.5 %      Paddy's Test Positive     Note --      Site Arterial: right radial     Modality Nasal Cannula     FIO2 38 %      Flow Rate 4.5 lpm      Sodium, Arterial 138.0 mmol/L      Potassium, Arterial 4.21 mmol/L      Ionized Calcium, Arterial 1.23 mmol/L      Chloride, Arterial 108 mmol/L      Glucose, Arterial 132 mmol/L      Lactate, Arterial 2.17 mmol/L      PO2/FIO2 155    STAT Lactic Acid, Reflex [[id number]] Collected: 11/10/21 0633    Specimen: Blood Updated: 11/10/21 0635           Imaging:  XR Chest 1 View    Result Date: 11/10/2021  PROCEDURE: XR CHEST 1 VW  COMPARISON: [facility], CR, XR CHEST 1 VW, 11/09/2021, 10:59.  INDICATIONS: SHORTNESS OF BREATH.  FINDINGS: Two AP upright portable views of the chest reveal severe bullous emphysematous changes of the lungs, seen previously.  Chronic pleural-parenchymal scarring and/or atelectasis may be present in the right lung base, accounting for the chronic blunting of the right lateral costophrenic sulcus.  Probably no change in the subpulmonic left-sided pneumothorax versus a large bulla.  No pneumothorax is suggested elsewhere.  There is mild gaseous distention of the stomach.  No cardiac enlargement.  CONCLUSION: Overall, little interval change is suggested radiographically since the prior study from 11/9/2021, allowing for projectional differences.       NARCISO CRENSHAW JR, MD       Electronically Signed and Approved By: NARCISO CRENSHAW JR, MD on 11/10/2021 at 3:42             XR Chest 1 View    Result Date: 11/9/2021  PROCEDURE: XR CHEST 1 VW  COMPARISON: [facility], CR, XR CHEST 1 VW, 9/28/2021, 14:55.  [facility], CR, XR CHEST 1 VW, 10/06/2021, 4:28.  [facility], CR, CHEST PA/AP & LAT 2V, 8/08/2010, 5:48.  [facility], CT, CT CHEST WO CONTRAST DIAGNOSTIC, 9/29/2021, 14:16.  [facility], CR, XR CHEST 1 VW, 10/04/2021, 14:07.  [facility], CR, XR CHEST 1 VW, 10/05/2021, 2:20.  [facility], CT, CT  CHEST WO CONTRAST DIAGNOSTIC, 10/05/2021, 3:00.  [facility], CR, XR CHEST PA AND LATERAL, 10/07/2021, 6:11.  [facility], CR, XR CHEST 2 VW, 10/26/2021, 14:44.  INDICATIONS: SOA Triage Protocol  FINDINGS:   Severe bullous emphysematous changes are present in the lung fields.  Questionable left basilar pneumothorax.  The heart and pulmonary vasculature are within normal limits.  No evidence of subcutaneous emphysema or mediastinal shift.  IMPRESSION: Severe bullous emphysema in the lung fields.  Questionable left basilar pneumothorax.  No evidence of mediastinal shift.  Suggest a follow-up CT scan of the chest for further evaluation.  ARTHUR RIOS MD       Electronically Signed and Approved By: ARTHUR RIOS MD on 11/09/2021 at 11:19             CT Chest Pulmonary Embolism    Result Date: 11/9/2021  PROCEDURE: CT CHEST PULMONARY EMBOLISM W CONTRAST  COMPARISON:  [facility], CT, CT CHEST WO CONTRAST DIAGNOSTIC, 10/05/2021, 3:00. INDICATIONS: L chest pain , tachycardia, shortness of breath and prior pneumothorax  TECHNIQUE: After obtaining the patient's consent, CT images were obtained with non-ionic intravenous contrast material.   PROTOCOL:   Standard imaging protocol performed    RADIATION:   DLP: 239.5mGy*cm   Automated exposure control was utilized to minimize radiation dose.  FINDINGS:  There is advanced bolus emphysematous lung disease.  There is no mediastinal or hilar lymphadenopathy.  There are no pulmonary artery filling defects to suggest the diagnosis of pulmonary embolism.  There is mild enlargement of the central pulmonary arteries.  The ascending aorta is mildly dilated measuring 3.7 cm.  This is unchanged from the patient's last scan.  The upper abdomen appears normal.   CONCLUSION:  1. Advanced bullous emphysematous lung disease 2. No evidence of pulmonary embolism 3. Mild ectasia of the ascending aorta with a maximal diameter 3.7 cm     Candelario KO  MD Pierre       Electronically Signed and Approved By: Candelario Jay MD on 11/09/2021 at 13:12               Procedures:  Procedures    Progress  ED Course as of 11/10/21 0659   Wed Nov 10, 2021   0248 ECG 12 Lead  Sinus tachycardia with rate of 119. LAD. QRS normal. UT interval normal. QTc interval is normal. No ST elevation or depression. No T wave abnormalities. No change when compared to miranda. This EKG was interpreted by me.  [LD]      ED Course User Index  [LD] Jluis Denny MD                            Medical Decision Making:  MDM  Number of Diagnoses or Management Options  Acute respiratory failure with hypoxia (HCC)  COPD with acute exacerbation (HCC)  Pneumonia due to infectious organism, unspecified laterality, unspecified part of lung  Diagnosis management comments: Patient presented to the emergency department with shortness of breath. On arrival O2 sats 75%. He was placed on supplemental to oxygen with O2 sat improving. He is not on oxygen at baseline at home. He states he has history of COPD, pulmonary fibrosis and bullous emphysema. Chest x-ray showed no acute changes. Patient has decreased breath sounds bilateral. He was given a nebulizer treatment. Labs were obtained that showed a PO2 of 58.9. Labs showed elevated lactic acid.  With this finding patient was started on antibiotics.  Discussed patient with hospitalist and he will be admitted for further care.       Amount and/or Complexity of Data Reviewed  Clinical lab tests: ordered and reviewed  Tests in the radiology section of CPT®: ordered and reviewed  Obtain history from someone other than the patient: yes (ED nurse states pt was 74% on room air for EMS which improved after administering a breathing treatment. )  Review and summarize past medical records: (Pt was seen in the ED on 11/9 for chest wall pain and COPD exacerbation. Pt was discharged home on Prednisone and Albuterol. )  Independent visualization of images, tracings,  or specimens: yes    Risk of Complications, Morbidity, and/or Mortality  Presenting problems: moderate  Management options: moderate    Critical Care  Total time providing critical care: 30-74 minutes (I spent 32 minutes providing critical care exclusive of procedures.   Time includes: direct patient care, patient reassessment, coordination of patient care, interpretation of data (laboratory data and imaging), review of patient's medical records, medical consultation, family consultation regarding treatment decisions and documentation of patient care.)       Final diagnoses:   COPD with acute exacerbation (HCC)   Acute respiratory failure with hypoxia (HCC)   Pneumonia due to infectious organism, unspecified laterality, unspecified part of lung        Disposition:  ED Disposition     ED Disposition Condition Comment    Decision to Admit  Level of Care: Telemetry [5]   Diagnosis: COPD with acute exacerbation (HCC) [678818]   Admitting Physician: THANH ROMO [[id number]]   Certification: I Certify That Inpatient Hospital Services Are Medically Necessary For Greater Than 2 Midnights            This medical record created using voice recognition software and may contain unintended errors.    Documentation assistance provided by Yuliya Carrillo acting as scribe for Jluis Denny MD. Information recorded by the scribe was done at my direction and has been verified and validated by me.          Yuliya Carrillo  11/10/21 0317       Jluis Denny MD  11/10/21 5080

## 2021-11-10 NOTE — H&P
[facility]   HISTORY AND PHYSICAL    Patient Name: Yogesh Vicente  : 1975  MRN: [id number]  Primary Care Physician:  Martin Friedman MD  Date of admission: 11/10/2021    Subjective   Subjective     Chief Complaint: Shortness of breath    HPI:    Yogesh Vicente is a 46 y.o. male with past medical history significant for COPD came into the emergency room complaining of increasing shortness of breath and wheezing and rattling in his chest and was unable to walk more than few yards and because of that reason patient came to the emergency room where he was found to be hypoxic and because of that reason is decided to admit him.  I saw the patient in the emergency room he is in mild respiratory distress he denies fever chills headache, chest pain    Review of Systems  Constitutional:        Weakness tiredness fatigue  Eyes:                       No blurry vision, eye discharge, eye irritation, eye pain  HEENT:                   No acute hair loss, earache and discharge, nasal congestion or discharge, sore throat, postnasal drip  Respiratory:           Shortness of breath   cardiovascular:     No chest pain, orthopnea, PND, dizziness, palpitation, lower extremity edema  Gastrointestinal:   No nausea vomiting diarrhea abdominal pain constipation  Genitourinary:       No urinary incontinence, hesitancy, frequency, urgency, dysuria  Neurological:        No confusion, headache, focal weakness, numbness, dysphasia  Hematologic:         No bruising, bleeding, pallor, lymphadenopathy  Endocrine:            No coldness, hot flashes, polyuria, abnormal hair growth  Musculoskeletal:    No body pains, aches, arthritic pains, muscle pain ,muscle wasting  Psychiatric:          No low or high mood, anxiety, hallucinations, delusions  Skin.                      No rash, ulcers, bruising, itching    Personal History     Past Medical History:   Diagnosis Date   • COPD (chronic obstructive pulmonary disease) (HCC)    • Empyema  (HCC)    • Pneumothorax    • Pulmonary fibrosis (HCC)        Past Surgical History:   Procedure Laterality Date   • CHEST TUBE INSERTION         Family History: family history is not on file. Otherwise pertinent FHx was reviewed and not pertinent to current issue.    Social History:  reports that he has been smoking cigarettes. He has a 16.50 pack-year smoking history. He has never used smokeless tobacco. He reports previous alcohol use. He reports current drug use. Drugs: Amphetamines and Marijuana.    Home Medications:  albuterol sulfate HFA, predniSONE, and tiotropium bromide-olodaterol      Allergies:  No Known Allergies    Objective   Objective     Vitals:   Temp:  [98.4 °F (36.9 °C)] 98.4 °F (36.9 °C)  Heart Rate:  [] 103  Resp:  [17-30] 17  BP: (100-110)/(74-78) 102/74  Flow (L/min):  [5] 5  Physical Exam               Constitutional:         Awake, alert responsive, conversant, no obvious distress   Eyes:                       PERRLA, sclerae anicteric, no conjunctival injection   HEENT:                   Moist mucous membranes, no nasal or eye discharge, no throat congestion   Neck:                      Supple, no thyromegaly, no lymphadenopathy, trachea midline, no elevated JVD   Respiratory:           Bilateral wheezing   cardiovascular:     RRR, no murmurs, rubs, or gallops, palpable pedal pulses bilaterally,No bilateral ankle edema   Gastrointestinal:   Positive bowel sounds, soft, nontender, nondistended, no organomegaly   Musculoskeletal:   No clubbing or cyanosis to extremities, muscle wasting, joint swelling, muscle weakness   Psychiatric:             Appropriate affect, cooperative   Neurologic:            Awake alert ,oriented x 3, strength symmetric in all extremities, Cranial Nerves grossly intact to confrontation, speech clear   Skin:                      No rashes, bruising, skin ulcers, petechiae or ecchymosis    Result Review    Result Review:  I have personally reviewed the results  from the time of this admission to 11/10/2021 07:47 EST and agree with these findings:  []  Laboratory  []  Microbiology  []  Radiology  []  EKG/Telemetry   []  Cardiology/Vascular   []  Pathology  []  Old records  []  Other:    Assessment/Plan   Assessment / Plan     Active Hospital Problems:  Active Hospital Problems    Diagnosis    • COPD with acute exacerbation (HCC)        Plan:   Bronchodilator therapy  IV steroids  Oxygen    DVT prophylaxis:  Medical DVT prophylaxis orders are present.    CODE STATUS:    Code Status (Patient has no pulse and is not breathing): CPR (Attempt to Resuscitate)  Medical Interventions (Patient has pulse or is breathing): Full Support    Admission Status:  I believe this patient meets inpatient status.    Electronically signed by Latoya Castro MD, 11/10/21, 7:47 AM EST.

## 2021-11-11 ENCOUNTER — APPOINTMENT (OUTPATIENT)
Dept: GENERAL RADIOLOGY | Facility: HOSPITAL | Age: 46
End: 2021-11-11

## 2021-11-11 LAB
BILIRUB UR QL STRIP: NEGATIVE
CLARITY UR: CLEAR
COLOR UR: YELLOW
GLUCOSE UR STRIP-MCNC: NEGATIVE MG/DL
HGB UR QL STRIP.AUTO: NEGATIVE
KETONES UR QL STRIP: NEGATIVE
LEUKOCYTE ESTERASE UR QL STRIP.AUTO: NEGATIVE
NITRITE UR QL STRIP: NEGATIVE
PH UR STRIP.AUTO: 6 [PH] (ref 5–8)
PROT UR QL STRIP: ABNORMAL
SP GR UR STRIP: >1.03 (ref 1–1.03)
UROBILINOGEN UR QL STRIP: ABNORMAL

## 2021-11-11 PROCEDURE — 71045 X-RAY EXAM CHEST 1 VIEW: CPT

## 2021-11-11 PROCEDURE — 99223 1ST HOSP IP/OBS HIGH 75: CPT | Performed by: INTERNAL MEDICINE

## 2021-11-11 PROCEDURE — 25010000002 METHYLPREDNISOLONE PER 125 MG: Performed by: INTERNAL MEDICINE

## 2021-11-11 PROCEDURE — 94799 UNLISTED PULMONARY SVC/PX: CPT

## 2021-11-11 PROCEDURE — 94760 N-INVAS EAR/PLS OXIMETRY 1: CPT

## 2021-11-11 PROCEDURE — 25010000002 METHYLPREDNISOLONE PER 125 MG: Performed by: NURSE PRACTITIONER

## 2021-11-11 PROCEDURE — 25010000002 ENOXAPARIN PER 10 MG: Performed by: INTERNAL MEDICINE

## 2021-11-11 PROCEDURE — 81003 URINALYSIS AUTO W/O SCOPE: CPT | Performed by: EMERGENCY MEDICINE

## 2021-11-11 RX ORDER — IPRATROPIUM BROMIDE AND ALBUTEROL SULFATE 2.5; .5 MG/3ML; MG/3ML
3 SOLUTION RESPIRATORY (INHALATION) EVERY 4 HOURS PRN
Status: DISCONTINUED | OUTPATIENT
Start: 2021-11-11 | End: 2021-11-13 | Stop reason: HOSPADM

## 2021-11-11 RX ORDER — BUDESONIDE 0.5 MG/2ML
0.5 INHALANT ORAL
Status: DISCONTINUED | OUTPATIENT
Start: 2021-11-11 | End: 2021-11-13 | Stop reason: HOSPADM

## 2021-11-11 RX ADMIN — IPRATROPIUM BROMIDE AND ALBUTEROL SULFATE 3 ML: .5; 2.5 SOLUTION RESPIRATORY (INHALATION) at 07:32

## 2021-11-11 RX ADMIN — ARFORMOTEROL TARTRATE 15 MCG: 15 SOLUTION RESPIRATORY (INHALATION) at 07:32

## 2021-11-11 RX ADMIN — IPRATROPIUM BROMIDE AND ALBUTEROL SULFATE 3 ML: .5; 2.5 SOLUTION RESPIRATORY (INHALATION) at 12:06

## 2021-11-11 RX ADMIN — IPRATROPIUM BROMIDE AND ALBUTEROL SULFATE 3 ML: .5; 3 SOLUTION RESPIRATORY (INHALATION) at 15:50

## 2021-11-11 RX ADMIN — IPRATROPIUM BROMIDE AND ALBUTEROL SULFATE 3 ML: .5; 2.5 SOLUTION RESPIRATORY (INHALATION) at 03:44

## 2021-11-11 RX ADMIN — FAMOTIDINE 40 MG: 20 TABLET, FILM COATED ORAL at 09:07

## 2021-11-11 RX ADMIN — BUDESONIDE 0.5 MG: 0.5 SUSPENSION RESPIRATORY (INHALATION) at 19:05

## 2021-11-11 RX ADMIN — METHYLPREDNISOLONE SODIUM SUCCINATE 60 MG: 125 INJECTION, POWDER, FOR SOLUTION INTRAMUSCULAR; INTRAVENOUS at 09:07

## 2021-11-11 RX ADMIN — METHYLPREDNISOLONE SODIUM SUCCINATE 60 MG: 125 INJECTION, POWDER, FOR SOLUTION INTRAMUSCULAR; INTRAVENOUS at 16:08

## 2021-11-11 RX ADMIN — ARFORMOTEROL TARTRATE 15 MCG: 15 SOLUTION RESPIRATORY (INHALATION) at 19:05

## 2021-11-11 RX ADMIN — METHYLPREDNISOLONE SODIUM SUCCINATE 60 MG: 125 INJECTION, POWDER, FOR SOLUTION INTRAMUSCULAR; INTRAVENOUS at 00:24

## 2021-11-11 NOTE — PROGRESS NOTES
[facility]     Progress Note    Patient Name: Yogesh Vicente  : 1975  MRN: [id number]  Primary Care Physician:  Martin Friedman MD  Date of admission: 11/10/2021    Subjective   Patient was feeling fine on room air but when I ambulated him his O2 sat dropped down into 70s.He was complaining of shortness of breath  Review of Systems  Constitutional:        Weakness tiredness fatigue  Eyes:                       No blurry vision, eye discharge, eye irritation, eye pain  HEENT:                   No acute hair loss, earache and discharge, nasal congestion or discharge, sore throat, postnasal drip  Respiratory:           Shortness of breath  Cardiovascular:     No chest pain, orthopnea, PND, dizziness, palpitation, lower extremity edema  Gastrointestinal:   No nausea vomiting diarrhea abdominal pain constipation  Genitourinary:       No urinary incontinence, hesitancy, frequency, urgency, dysuria  Hematologic:         No bruising, bleeding, pallor, lymphadenopathy  Endocrine:            No coldness, hot flashes, polyuria, abnormal hair growth  Musculoskeletal:    No body pains, aches, arthritic pains, muscle pain ,muscle wasting  Psychiatric:          No low or high mood, anxiety, hallucinations, delusions  Skin.                      No rash, ulcers, bruising, itching  Neurological:        No confusion, headache, focal weakness, numbness, dysphasia    Objective   Objective     Vitals:   Temp:  [97 °F (36.1 °C)-98.2 °F (36.8 °C)] 97 °F (36.1 °C)  Heart Rate:  [] 88  Resp:  [18-24] 18  BP: (105-122)/(58-86) 119/84  Flow (L/min):  [1-4] 1  Physical Exam    Constitutional: Awake, alert responsive, conversant, no obvious distress              Psychiatric:  Appropriate affect, cooperative   Neurologic:  Awake alert ,oriented x 3, strength symmetric in all extremities, Cranial Nerves grossly intact to confrontation, speech clear   Eyes:   PERRLA, sclerae anicteric, no conjunctival  injection   HEENT:  Moist mucous membranes, no nasal or eye discharge, no throat congestion   Neck:   Supple, no thyromegaly, no lymphadenopathy, trachea midline, no elevated JVD   Respiratory:  Very poor air movement   Cardiovascular: RRR, no murmurs, rubs, or gallops, palpable pedal pulses bilaterally, No bilateral ankle edema   Gastrointestinal: Positive bowel sounds, soft, nontender, nondistended, no organomegaly   Musculoskeletal:  No clubbing or cyanosis to extremities,muscle wasting, joint swelling, muscle weakness             Skin:                      No rashes, bruising, skin ulcers, petechiae or ecchymosis    Result Review    Result Review:  I have personally reviewed the results from the time of this admission to 11/11/2021 12:44 EST and agree with these findings:  []  Laboratory  []  Microbiology  []  Radiology  []  EKG/Telemetry   []  Cardiology/Vascular   []  Pathology  []  Old records  []  Other:    Assessment/Plan   Assessment / Plan       Active Hospital Problems:  Active Hospital Problems    Diagnosis    • COPD with acute exacerbation (HCC)      Pulmonary embolism negative         Plan:   Continue steroids,Patient may require oxygen at home bronchodilator therapy         Electronically signed by Latoya Castro MD, 11/11/21, 12:44 PM EST.

## 2021-11-11 NOTE — PLAN OF CARE
Goal Outcome Evaluation:     O2 saturation dropped to 76% during hallway ambulation.  Incentive spirometer provided and ambulation plan devised.  Susan Gomez RN

## 2021-11-11 NOTE — CONSULTS
Pulmonary / Critical Care Consult Note      Patient Name: Yogesh Vicente  : 1975  MRN: [id number]  Primary Care Physician:  Martin Friedman MD  Referring Physician: No ref. provider found  Date of admission: 11/10/2021    Subjective   Subjective     Reason for Consult/ Chief Complaint: shortness of breath, COPD exacerbation    HPI:  Yogesh Vicente is a 46 y.o. male with past medical history of severe bullous emphysema, amphetamine use now in remission, and chronic smoking who presented to ED with complaints of sudden onset of shortness of breath while at work.  States he was up walking at work when he became short of breath.  He sat down to rest which did not help so he called EMS.  In the ED CT chest revealed advanced bullous emphysematous lung, no PE, ascending aorta dilated at 3.7 cm.  He was admitted for COPD exacerbation.  This morning he was up walking in the halls and O2 sats dropped in the 70s.  O2 was applied at 4 L NC and our services was consulted for further evaluation and treatment.  Upon exam, he is lying in bed on 4 L NC.  He appears to be resting comfortably.  He feels fine now, but short of breath with exertion.  He has a nonproductive cough.  He  Has chest pain intermittently with deep breathing.  He denies any hemoptysis, nausea, vomiting, or diarrhea.  He continues to smoke 1/2 PPD.  Of note, he was hospitalized approximately one month ago for ruptured bleb which required chest tube placement.  He presented back one week later and had needle decompression by EMS.    Review of Systems  Constitutional symptoms:  Denied complaints   Ear, nose, throat: Denied complaints  Cardiovascular:  chest pain, PEDRO, otherwise denied complaints  Respiratory: dyspnea, nonproductive cough, otherwise denied complaints  Gastrointestinal: Denied complaints  Musculoskeletal: Denied complaints  Genitourinary: Denied complaints  Allergy / Immunology: Denied complaints  Hematologic: Denied  complaints  Neurologic: Denied complaints  Skin: Denied complaints  Endocrine: Denied complaints  Psychiatric: PTSD, otherwise denied complaints    Personal History     Past Medical History:   Diagnosis Date   • COPD (chronic obstructive pulmonary disease) (HCC)    • Empyema (HCC)    • Pneumothorax    • Pulmonary fibrosis (HCC)        Past Surgical History:   Procedure Laterality Date   • CHEST TUBE INSERTION     2017 VATS vs bullectomy at [facility]    Family History: family history is not on file. Otherwise pertinent FHx was reviewed and not pertinent to current issue. Father,  at 60 with drug and alcohol abuse, lung cancer.  Mother, alive, unknown health history.  Uncle on fathers side has COPD.    Social History:  reports that he has been smoking cigarettes. He has a 16.50 pack-year smoking history. He has never used smokeless tobacco. He reports previous alcohol use. He reports current drug use. Drugs: Amphetamines and Marijuana.  . Current smoker with 33 pack year history. Smokes 1/2 PPD.  History of amphetamine and marijuana use.  Has been clean for 2 years.  Home Medications:  albuterol sulfate HFA, predniSONE, and tiotropium bromide-olodaterol    Allergies:  No Known Allergies    Objective    Objective     Vitals:   Temp:  [97 °F (36.1 °C)-97.9 °F (36.6 °C)] 97 °F (36.1 °C)  Heart Rate:  [] 88  Resp:  [18-24] 18  BP: (105-122)/(58-84) 119/84  Flow (L/min):  [1-4] 1    Physical Exam:  Vital Signs Reviewed   General: Thin male, Awake and Alert, NAD on 4 L NC    HEENT:  PERRL, EOMI.  OP, nares clear, no sinus tenderness  Neck:  Supple, no JVD, no thyromegaly  Lymph: no axillary, cervical, supraclavicular lymphadenopathy noted bilaterally  Chest:  poor aeration, barrel chested, diminished to auscultation bilaterally, tympanic to percussion bilaterally, no work of breathing noted  CV: RRR, no MGR, pulses 2+, equal  Abd:  Soft, NT, ND, + BS, no HSM  EXT:  no clubbing, no cyanosis, no edema, no joint  tenderness  Neuro:  A&Ox3, CN grossly intact, no focal deficits  Skin: No rashes or lesions noted    Result Review    Result Review:  I have personally reviewed the results from the time of this admission to 11/11/2021 14:03 EST and agree with these findings:  [x]  Laboratory  [x]  Microbiology  [x]  Radiology  []  EKG/Telemetry   []  Cardiology/Vascular   []  Pathology  [x]  Old records  []  Other:  Most notable findings include: WBC 13.57, K 4.2, Cr 1.02, proBNP 83.6, troponin 0.01    11/10 0400 ABG 7.4, 33, 58, 21 on 4.5 L    Flu negative  COVID negative  Blood cultures no growth at 24 hours    11/9 CT Chest advanced bullous emphysematous lung, no PE, ascending aorta dilated at 3.7 cm    11/10 CXR severe bullous emphysematous changes    Assessment/Plan   Assessment / Plan     Active Hospital Problems:  Active Hospital Problems    Diagnosis    • COPD with acute exacerbation (HCC)      Pulmonary embolism negative       Impression:  COPD exacerbation  Severe bullous emphysema  Tobacco abuse  Hx of amphetamine use    Plan:  Reviewed CT of chest with patient.  Discussed the severity of his emphysema.  Explained the need to quit smoking.  He will need to quit smoking for 6 months prior to being able to have lung transplant consult.  Obtain CXR.  Start Pulmicort nebulizer.  Continue Brovana nebulizer.  Will add Spirivia inhaler.  Start Prednisone 50 mg daily and would recommend taper over the next 15 days.  Will need 6MWT prior to discharge.  Will add bronchopulmonary hygiene.  Encourage IS use.  Smoking cessation.  Encourage activity.  Up to chair/ambulate as tolerated.  Consult RT CM for smoking cessation and COPD education.  Will need nebulizer with Brovana and Pulmicort for home.  Also will need Spiriva inhaler for home.      Labs, microbiology, radiology, medications, and provider notes personally reviewed.  Discussed with primary services and bedside RN.    Thank you for this consult and allowing me to participate  in the care of    I, Perla RING , am scribing for Dr. Barnett on 11/11/21.     Portions of this note were scribed by,  Perla RING, who was present on rounds with me. I personally reviewed the entirety of the documentation & made changes where appropriate. The documentation, as is signed by myself, accurately reflects my involvement in the patients care today.    Electronically signed by CATHIE Jimenez, 11/11/21, 3:45 PM EST.    Electronically signed by J Carlos Barnett MD, 11/11/2021, 14:03 EST.

## 2021-11-11 NOTE — NURSING NOTE
Exercise Oximetry    Patient Name:Yogesh Vicente   MRN: [id number]   Date: 11/11/21             ROOM AIR BASELINE   SpO2% 88   Heart Rate 95   Blood Pressure 115/76     EXERCISE ON ROOM AIR SpO2% EXERCISE ON O2 @4  LPM SpO2%   1 MINUTE 86 1 MINUTE 90   2 MINUTES  2 MINUTES 85   3 MINUTES  3 MINUTES    4 MINUTES  4 MINUTES    5 MINUTES  5 MINUTES    6 MINUTES  6 MINUTES               Distance Walked  0=10 feet Distance Walked     224 feet   Dyspnea (Flavio Scale)   Dyspnea (Flavio Scale)   Fatigue (Flavio Scale)   Fatigue (Flavio Scale)   SpO2% Post Exercise   SpO2% Post Exercise   HR Post Exercise   HR Post Exercise   Time to Recovery   Time to Recovery     Comments:

## 2021-11-11 NOTE — PLAN OF CARE
Goal Outcome Evaluation:      Bronchoscope scheduled today produced patient anxiety.  Susan Gomez RN

## 2021-11-12 PROBLEM — J44.1 COPD EXACERBATION: Status: ACTIVE | Noted: 2021-11-12

## 2021-11-12 PROCEDURE — 94799 UNLISTED PULMONARY SVC/PX: CPT

## 2021-11-12 PROCEDURE — 99232 SBSQ HOSP IP/OBS MODERATE 35: CPT | Performed by: INTERNAL MEDICINE

## 2021-11-12 PROCEDURE — 63710000001 PREDNISONE PER 5 MG: Performed by: NURSE PRACTITIONER

## 2021-11-12 RX ADMIN — ARFORMOTEROL TARTRATE 15 MCG: 15 SOLUTION RESPIRATORY (INHALATION) at 19:18

## 2021-11-12 RX ADMIN — BUDESONIDE 0.5 MG: 0.5 SUSPENSION RESPIRATORY (INHALATION) at 19:18

## 2021-11-12 RX ADMIN — DOCUSATE SODIUM 50MG AND SENNOSIDES 8.6MG 2 TABLET: 8.6; 5 TABLET, FILM COATED ORAL at 09:10

## 2021-11-12 RX ADMIN — FAMOTIDINE 40 MG: 20 TABLET, FILM COATED ORAL at 09:10

## 2021-11-12 RX ADMIN — ARFORMOTEROL TARTRATE 15 MCG: 15 SOLUTION RESPIRATORY (INHALATION) at 07:52

## 2021-11-12 RX ADMIN — BUDESONIDE 0.5 MG: 0.5 SUSPENSION RESPIRATORY (INHALATION) at 07:52

## 2021-11-12 RX ADMIN — PREDNISONE 50 MG: 20 TABLET ORAL at 09:10

## 2021-11-12 NOTE — PLAN OF CARE
Goal Outcome Evaluation:      No acute events overnight. Remains on 4L O2. Day-shift reported walked hallway three times yesterday. Plan for discharge today.

## 2021-11-12 NOTE — CONSULTS
Perla Shah consulted RT CM to arrange for nebulizer, home O2, spiriva, budesonide and brovana on discharge.  Pt will need six minute walk test prior to discharge to determine home oxygen settings.  Home nebulizer orders put in for cosign by attending.  Pt has passport insurance which will require a PA for Brovana and Budesonide. Budesonide is generally only approved for pt's with age limitations.  Spiriva is on the Passport  Formulary, but must only in the handihaler form.   Perla Shah alerted of this situation.  RT did submit PA for Brovana and budesonide and is awaiting decision.  Follow up with pulmonary has been arranged.   Benny alerted of pt's pending oxygen and nebulizer orders.     INSURANCE ASKING FOR ADDITIONAL INFORMATION FOR HERRERA PA; RT CM WILL NEED SPIROMETRY RESULTS; ORDER PLACED WITH RESPIRATORY THERAPY FOR FORCED VITAL CAPACITY

## 2021-11-12 NOTE — PLAN OF CARE
Goal Outcome Evaluation:  Plan of Care Reviewed With: patient        Progress: improving  Outcome Summary: plan is to discharge tomorrow with oxygen. VSS. Patient has no complaints at this time.

## 2021-11-12 NOTE — PROGRESS NOTES
Pulmonary / Critical Care Progress Note      Patient Name: Yogesh Vicente  : 1975  MRN: [id number]  Primary Care Physician:  Martin Friedman MD  Date of admission: 11/10/2021    Subjective   Subjective   Follow-up for COPD exacerbation.    Over past 24 hours, continues on steroids and nebulizers.    No acute events overnight.     This morning,   Lying in bed on room air  Wears O2 as needed  Dyspnea unchanged  Nonproductive cough  No chest pain  No fever or chills  Feels about the same    Review of Systems  Constitutional symptoms:  Denied complaints   Ear, nose, throat: Denied complaints  Cardiovascular:  chest pain, PEDRO, otherwise denied complaints  Respiratory: dyspnea, nonproductive cough, otherwise denied complaints  Gastrointestinal: Denied complaints  Musculoskeletal: Denied complaints  Psychiatric: PTSD, otherwise denied complaints    Objective   Objective     Vitals:   Temp:  [96.8 °F (36 °C)-97.6 °F (36.4 °C)] 97.6 °F (36.4 °C)  Heart Rate:  [] 68  Resp:  [16-20] 18  BP: (104-122)/(60-90) 122/90  Flow (L/min):  [1-4] 4    Physical Exam   Vital Signs Reviewed   General: Thin male, Awake and Alert, NAD on room air    HEENT:  PERRL, EOMI.  OP, nares clear, no sinus tenderness  Neck:  Supple, no JVD, no thyromegaly  Lymph: no axillary, cervical, supraclavicular lymphadenopathy noted bilaterally  Chest:  poor aeration, barrel chested, diminished to auscultation bilaterally, tympanic to percussion bilaterally, no work of breathing noted  CV: RRR, no MGR, pulses 2+, equal  Abd:  Soft, NT, ND, + BS, no HSM  EXT:  no clubbing, no cyanosis, no edema, no joint tenderness  Neuro:  A&Ox3, CN grossly intact, no focal deficits  Skin: No rashes or lesions noted    Result Review    Result Review:  I have personally reviewed the results from the time of this admission to 2021 06:56 EST and agree with these findings:  [x]  Laboratory  [x]  Microbiology  [x]  Radiology  [x]  EKG/Telemetry   []   Cardiology/Vascular   []  Pathology  []  Old records  []  Other:  Most notable findings include: No am labs    11/11 CXR with marked emphysematous changes, chronic atelectasis, stable subpulmonic pneumothorax on left    Assessment/Plan   Assessment / Plan     Active Hospital Problems:  Active Hospital Problems    Diagnosis    • COPD with acute exacerbation (HCC)      Pulmonary embolism negative       Impression:   COPD exacerbation  Severe bullous emphysema  Tobacco abuse  Hx of amphetamine use    Plan:   Continue supplemental to keep sats >90%.  Failed 6MWT.  Will need oxygen set up at discharge.  Continue Prednisone 50 mg daily and would recommend taper over the next 15 days.  Continue Pulmicort and Brovana nebulizers.  Continue Spirivia inhaler.  Continue bronchopulmonary hygiene.  Encourage IS use.  Smoking cessation.  Encourage activity.  Up to chair/ambulate as tolerated.  Consult RT CM for smoking cessation and COPD education and check insurance coverage on nebulizers.     From pulmonary standpoint ok to discharge home with oxygen set up, nebulizer, pulmicort and brovana neblizer, and spiriva inhaler.  Will need to follow up in our clinic in 2 weeks.    DVT prophylaxis:  Medical DVT prophylaxis orders are present.    CODE STATUS:   Code Status (Patient has no pulse and is not breathing): CPR (Attempt to Resuscitate)  Medical Interventions (Patient has pulse or is breathing): Full Support    Labs, microbiology, radiology, medications, and provider notes personally reviewed.  Discussed with primary services and bedside RN.    Electronically signed by CATHIE Jimenez, 11/12/21, 9:51 AM EST.    Electronically signed by J Carlos Barnett MD, 11/12/2021, 06:56 EST.

## 2021-11-12 NOTE — PROGRESS NOTES
[facility]     Progress Note    Patient Name: Yogesh Vicente  : 1975  MRN: [id number]  Primary Care Physician:  Martin Friedman MD  Date of admission: 11/10/2021    Subjective   Patient states he failed the walk test yesterday.  Has been feeling better.  No other concerns  Review of Systems  Constitutional:        Weakness tiredness fatigue  Eyes:                       No blurry vision, eye discharge, eye irritation, eye pain  HEENT:                   No acute hair loss, earache and discharge, nasal congestion or discharge, sore throat, postnasal drip  Respiratory:           No shortness of breath coughing sputum production wheezing hemoptysis pleuritic chest pain  Cardiovascular:     No chest pain, orthopnea, PND, dizziness, palpitation, lower extremity edema  Gastrointestinal:   No nausea vomiting diarrhea abdominal pain constipation  Genitourinary:       No urinary incontinence, hesitancy, frequency, urgency, dysuria  Hematologic:         No bruising, bleeding, pallor, lymphadenopathy  Endocrine:            No coldness, hot flashes, polyuria, abnormal hair growth  Musculoskeletal:    No body pains, aches, arthritic pains, muscle pain ,muscle wasting  Psychiatric:          No low or high mood, anxiety, hallucinations, delusions  Skin.                      No rash, ulcers, bruising, itching  Neurological:        No confusion, headache, focal weakness, numbness, dysphasia    Objective   Objective     Vitals:   Temp:  [96.8 °F (36 °C)-97.6 °F (36.4 °C)] 97.6 °F (36.4 °C)  Heart Rate:  [68-98] 91  Resp:  [16-20] 18  BP: (104-125)/(74-90) 125/82  Flow (L/min):  [1-4] 4  Physical Exam    Constitutional: Awake, alert responsive, conversant, no obvious distress              Psychiatric:  Appropriate affect, cooperative   Neurologic:  Awake alert ,oriented x 3, strength symmetric in all extremities, Cranial Nerves grossly intact to confrontation, speech clear   Eyes:   PERRLA, sclerae anicteric, no  conjunctival injection   HEENT:  Moist mucous membranes, no nasal or eye discharge, no throat congestion   Neck:   Supple, no thyromegaly, no lymphadenopathy, trachea midline, no elevated JVD   Respiratory:  Clear to auscultation bilaterally, nonlabored respirations    Cardiovascular: RRR, no murmurs, rubs, or gallops, palpable pedal pulses bilaterally, No bilateral ankle edema   Gastrointestinal: Positive bowel sounds, soft, nontender, nondistended, no organomegaly   Musculoskeletal:  No clubbing or cyanosis to extremities,muscle wasting, joint swelling, muscle weakness             Skin:                      No rashes, bruising, skin ulcers, petechiae or ecchymosis    Result Review    Result Review:  I have personally reviewed the results from the time of this admission to 11/12/2021 14:23 EST and agree with these findings:  [x]  Laboratory  [x]  Microbiology  []  Radiology  []  EKG/Telemetry   []  Cardiology/Vascular   []  Pathology  []  Old records  []  Other:    Assessment/Plan   Assessment / Plan       Active Hospital Problems:  Active Hospital Problems    Diagnosis    • COPD exacerbation (HCC)    • COPD with acute exacerbation (HCC)      Pulmonary embolism negative         Plan:   Discharge tomorrow with home oxygen   D/c with nebulizer  Continue supportive care  O2 sat dropped down to 74% on walking  I have reviewed the information and agree with the discharge tomorrow  Electronically signed by CATHIE Sanches, 11/12/21, 2:23 PM EST.

## 2021-11-13 ENCOUNTER — READMISSION MANAGEMENT (OUTPATIENT)
Dept: CALL CENTER | Facility: HOSPITAL | Age: 46
End: 2021-11-13

## 2021-11-13 VITALS
BODY MASS INDEX: 19.95 KG/M2 | DIASTOLIC BLOOD PRESSURE: 68 MMHG | HEART RATE: 72 BPM | HEIGHT: 68 IN | OXYGEN SATURATION: 97 % | RESPIRATION RATE: 16 BRPM | WEIGHT: 131.61 LBS | SYSTOLIC BLOOD PRESSURE: 129 MMHG | TEMPERATURE: 97.4 F

## 2021-11-13 PROCEDURE — 94640 AIRWAY INHALATION TREATMENT: CPT

## 2021-11-13 PROCEDURE — 94799 UNLISTED PULMONARY SVC/PX: CPT

## 2021-11-13 PROCEDURE — 99232 SBSQ HOSP IP/OBS MODERATE 35: CPT | Performed by: INTERNAL MEDICINE

## 2021-11-13 PROCEDURE — 63710000001 PREDNISONE PER 1 MG: Performed by: NURSE PRACTITIONER

## 2021-11-13 PROCEDURE — 63710000001 PREDNISONE PER 5 MG: Performed by: NURSE PRACTITIONER

## 2021-11-13 RX ORDER — ARFORMOTEROL TARTRATE 15 UG/2ML
15 SOLUTION RESPIRATORY (INHALATION)
Qty: 120 ML | Refills: 0 | Status: SHIPPED | OUTPATIENT
Start: 2021-11-13 | End: 2022-01-13

## 2021-11-13 RX ORDER — BUDESONIDE 0.5 MG/2ML
0.5 INHALANT ORAL
Qty: 244 ML | Refills: 0 | Status: SHIPPED | OUTPATIENT
Start: 2021-11-13 | End: 2022-01-13

## 2021-11-13 RX ORDER — PREDNISONE 50 MG/1
50 TABLET ORAL
Qty: 5 TABLET | Refills: 0 | Status: SHIPPED | OUTPATIENT
Start: 2021-11-14 | End: 2021-11-19

## 2021-11-13 RX ADMIN — FAMOTIDINE 40 MG: 20 TABLET, FILM COATED ORAL at 08:35

## 2021-11-13 RX ADMIN — PREDNISONE 50 MG: 20 TABLET ORAL at 08:35

## 2021-11-13 RX ADMIN — ARFORMOTEROL TARTRATE 15 MCG: 15 SOLUTION RESPIRATORY (INHALATION) at 07:11

## 2021-11-13 RX ADMIN — BUDESONIDE 0.5 MG: 0.5 SUSPENSION RESPIRATORY (INHALATION) at 07:11

## 2021-11-13 RX ADMIN — TIOTROPIUM BROMIDE INHALATION SPRAY 2 PUFF: 3.12 SPRAY, METERED RESPIRATORY (INHALATION) at 07:11

## 2021-11-13 NOTE — PROGRESS NOTES
Pulmonary / Critical Care Progress Note      Patient Name: Yogesh Vicente  : 1975  MRN: [id number]  Primary Care Physician:  Martin Friedman MD  Date of admission: 11/10/2021    Subjective   Subjective   Follow-up for COPD exacerbation.    Over past 24 hours, continues on steroids and nebulizers.    No acute events overnight.     This morning,   Lying in bed on room air  On 2 LNC  Dyspnea unchanged  Cough is more productive after breathing treatments, otherwise mostly nonproductive  Ready to go home     Review of Systems  General: Fatigue, weakness; otherwise denied complaints  HEENT: Denied complaints  Respiratory: Shortness of breath, PEDRO, cough; otherwise denied complaints  Cardiovascular: PEDRO; otherwise denied complaints  GI: Denied complaints  Neurologic: Denied complaints  Skin: Denied complaints  Psychiatric: PTSD, otherwise denied complaints    Objective   Objective     Vitals:   Temp:  [96.8 °F (36 °C)-98.2 °F (36.8 °C)] 97.9 °F (36.6 °C)  Heart Rate:  [59-91] 69  Resp:  [18-20] 20  BP: (105-130)/(74-97) 130/88  Flow (L/min):  [2-3] 2    Physical Exam   Vital Signs Reviewed   General: Thin male, Awake and Alert, NAD on room air    HEENT:  PERRL, EOMI.  OP, nares clear  Neck:  Supple, no JVD, no thyromegaly  Chest:  poor aeration, barrel chested, diminished to auscultation bilaterally, no work of breathing noted at rest  CV: RRR, no MGR, pulses 2+, equal  Abd:  Soft, NT, ND, + BS, no HSM  EXT:  no clubbing, no cyanosis, no edema, no joint tenderness  Neuro:  A&Ox3, CN grossly intact, no focal deficits  Skin: No rashes or lesions noted    Result Review    Result Review:  I have personally reviewed the results from the time of this admission to 2021 08:30 EST and agree with these findings:  [x]  Laboratory  [x]  Microbiology  [x]  Radiology  [x]  EKG/Telemetry   []  Cardiology/Vascular   []  Pathology  []  Old records  []  Other:  Most notable findings include:      CXR with marked  emphysematous changes, chronic atelectasis, stable subpulmonic pneumothorax on left    Assessment/Plan   Assessment / Plan     Active Hospital Problems:  Active Hospital Problems    Diagnosis    • COPD exacerbation (HCC)    • COPD with acute exacerbation (HCC)      Pulmonary embolism negative       Impression:   COPD exacerbation  Severe bullous emphysema  Tobacco abuse  Hx of amphetamine use    Plan:   Continue supplemental to keep sats >90%.  Will need O2 set up at discharge.  RT  arranged for breathing treatments and O2 at home upon discharge  Continue Prednisone 50 mg daily and would recommend taper over the next 15 days.  Continue Pulmicort and Brovana nebulizers.  Continue Spirivia inhaler.  Continue bronchopulmonary hygiene.  Encourage IS use.  Smoking cessation.  Encourage activity.  Up to chair/ambulate as tolerated.  Consult RT CM for smoking cessation and COPD education and check insurance coverage on nebulizers.     From pulmonary standpoint ok to discharge home with oxygen set up, nebulizer, pulmicort and brovana neblizer, and spiriva inhaler.  Will need to follow up in our clinic in 2 weeks.    DVT prophylaxis:  Medical DVT prophylaxis orders are present.    CODE STATUS:   Code Status (Patient has no pulse and is not breathing): CPR (Attempt to Resuscitate)  Medical Interventions (Patient has pulse or is breathing): Full Support    Labs, microbiology, radiology, medications, and provider notes personally reviewed.  Discussed with primary services and bedside RN.    Electronically signed by SAI Frias, 11/13/21, 12:44 PM EST.  Electronically signed by Peter Julio MD, 11/13/21, 3:37 PM EST.

## 2021-11-13 NOTE — DISCHARGE INSTR - APPOINTMENTS
Call Dr. Friedman's office at [phone] to schedule a follow-up appointment for 3-10 days after discharge

## 2021-11-13 NOTE — DISCHARGE SUMMARY
[facility]   DISCHARGE SUMMARY    Patient Name: Yogesh Vicente  : 1975  MRN: [id number]    Date of Admission: 11/10/2021  Date of Discharge: 2021  Primary Care Physician: Martin Friedman MD    Consults     Date and Time Order Name Status Description    11/10/2021  4:13 AM IP General Consult (Use specialty-specific consult if known)            Hospital Course     Presenting Problem:   COPD with acute exacerbation (HCC) [J44.1]  Acute respiratory failure with hypoxia (HCC) [J96.01]  Pneumonia due to infectious organism, unspecified laterality, unspecified part of lung [J18.9]    Active and resolved problems  Active Problems:    COPD with acute exacerbation (HCC)  Overview:    COPD exacerbation (HCC)  Overview:  Resolved Problems:    * No resolved hospital problems. *       Hospital Course:  Yogesh Vicente is a 46 y.o. male with a PMHof COPD and tobacco use  came into the emergency room complaining of increasing shortness of breath and wheezing and rattling in his chest and was unable to walk more than few yards and because of that reason patient came to the emergency room where he was found to be hypoxic and was admitted.  Pulmonary was consulted for ED CT that showed advanced bullous emphysematous lung, no PE, ascending aorta dilated at 3.7 cm.  A walk test was performed by MD and he dropped to the 70s without oxygen. Pulmonary cleared patient and he will go home with home oxygen, pulmicort and brovana neblizer, and spiriva inhaler.  Patient will follow with pulmonary in 2 weeks and follow with Dr. Friedman in 3 days.  I have personally reviewed discharge summary and agree with the plan      Vital Signs:  Temp:  [96.8 °F (36 °C)-98.2 °F (36.8 °C)] 97.3 °F (36.3 °C)  Heart Rate:  [59-87] 67  Resp:  [18-20] 18  BP: (105-130)/(74-97) 120/78  Flow (L/min):  [2-3] 2      Discharge Details        Discharge Medications      New Medications      Instructions Start Date   !PATIENT HOME MEDICATIONS STORED IN  PHARMACY   Does not apply, 2 Times Daily      arformoterol 15 MCG/2ML nebulizer solution  Commonly known as: BROVANA   15 mcg, Nebulization, 2 Times Daily - RT      budesonide 0.5 MG/2ML nebulizer solution  Commonly known as: PULMICORT   0.5 mg, Nebulization, 2 Times Daily - RT         Continue These Medications      Instructions Start Date   Stiolto Respimat 2.5-2.5 MCG/ACT aerosol solution inhaler  Generic drug: tiotropium bromide-olodaterol   2 puffs, Inhalation, Daily - RT         Stop These Medications    predniSONE 20 MG tablet  Commonly known as: DELTASONE        ASK your doctor about these medications      Instructions Start Date   albuterol sulfate  (90 Base) MCG/ACT inhaler  Commonly known as: PROVENTIL HFA;VENTOLIN HFA;PROAIR HFA  Ask about: Which instructions should I use?   2 puffs, Inhalation, Every 4 Hours PRN             No Known Allergies      Discharge Disposition:  Home or Self Care  Stop smoking  Home oxygen and nebulizers  Diet:  resume normal duet      Discharge Activity:   resume normal activity      CODE STATUS:    Code Status and Medical Interventions:   Ordered at: 11/10/21 0746     Code Status (Patient has no pulse and is not breathing):    CPR (Attempt to Resuscitate)     Medical Interventions (Patient has pulse or is breathing):    Full Support         Future Appointments   Date Time Provider Department Center   11/19/2021  1:30 PM James Morales MD [facility]           Time spent on Discharge including face to face service:  40 minutes    Electronically signed by CATHIE Sanches, 11/13/21, 10:10 AM EST.

## 2021-11-13 NOTE — SIGNIFICANT NOTE
11/13/21 3445   Plan   Plan Comments Pt to discharge home today. pt failed walk test requiring 2 liters of oxygen. Referral has already been sent to [facility] for home oxygen and neb. SW will provide pt with both DME items at discharge.   Final Discharge Disposition Code 01 - home or self-care

## 2021-11-13 NOTE — NURSING NOTE
Exercise Oximetry    Patient Name:Yogesh Vicente   MRN: [id number]   Date: 11/13/21             ROOM AIR BASELINE   SpO2% 90 on room air   Heart Rate  79   Blood Pressure 129/84     EXERCISE ON ROOM AIR SpO2% EXERCISE ON O2 @ LPM SpO2%   1 MINUTE 90 1 MINUTE    2 MINUTES 86 2 MINUTES    3 MINUTES 84 3 MINUTES    4 MINUTES 74 4 MINUTES    5 MINUTES  5 MINUTES    6 MINUTES  6 MINUTES               Distance Walked  Partially around 4mtu Distance Walked   Dyspnea (Flavio Scale)   Dyspnea (Flavio Scale)   Fatigue (Flavio Scale)   Fatigue (Flavio Scale)   SpO2% Post Exercise  74% on room air SpO2% Post Exercise   HR Post Exercise  HR Post Exercise   Time to Recovery  Multiple minutes Time to Recovery     Comments: patient was unable to complete the 6 minutes.  Patient is now wearing 2 liters via nasal cannula.

## 2021-11-13 NOTE — PLAN OF CARE
Goal Outcome Evaluation:  Plan of Care Reviewed With: patient           Outcome Summary: Potentially going home today, no acute issues presented.me

## 2021-11-14 NOTE — OUTREACH NOTE
Prep Survey      Responses   [facility]   Is LACE score < 7 ? No   Emergency Room discharge w/ pulse ox? No   Eligibility Readm Mgmt   Discharge diagnosis COPD with acute exacerbation    Does the patient have one of the following disease processes/diagnoses(primary or secondary)? COPD/Pneumonia   Does the patient have Home health ordered? No   Is there a DME ordered? Yes   What DME was ordered? [facility]   Prep survey completed? Yes          Fang Kaplan RN

## 2021-11-15 ENCOUNTER — HOSPITAL ENCOUNTER (OUTPATIENT)
Facility: HOSPITAL | Age: 46
Setting detail: OBSERVATION
Discharge: HOME | End: 2021-11-15
Attending: EMERGENCY MEDICINE | Admitting: INTERNAL MEDICINE

## 2021-11-15 ENCOUNTER — READMISSION MANAGEMENT (OUTPATIENT)
Dept: CALL CENTER | Facility: HOSPITAL | Age: 46
End: 2021-11-15

## 2021-11-15 ENCOUNTER — APPOINTMENT (OUTPATIENT)
Dept: GENERAL RADIOLOGY | Facility: HOSPITAL | Age: 46
End: 2021-11-15

## 2021-11-15 VITALS
OXYGEN SATURATION: 92 % | BODY MASS INDEX: 20.25 KG/M2 | TEMPERATURE: 98 F | DIASTOLIC BLOOD PRESSURE: 72 MMHG | HEIGHT: 68 IN | WEIGHT: 133.6 LBS | RESPIRATION RATE: 19 BRPM | SYSTOLIC BLOOD PRESSURE: 128 MMHG | HEART RATE: 80 BPM

## 2021-11-15 DIAGNOSIS — J44.1 ACUTE EXACERBATION OF CHRONIC OBSTRUCTIVE PULMONARY DISEASE (COPD) (HCC): ICD-10-CM

## 2021-11-15 DIAGNOSIS — J96.21 ACUTE ON CHRONIC RESPIRATORY FAILURE WITH HYPOXIA (HCC): Primary | ICD-10-CM

## 2021-11-15 LAB
ALBUMIN SERPL-MCNC: 4.3 G/DL (ref 3.5–5.2)
ALBUMIN/GLOB SERPL: 1.8 G/DL
ALP SERPL-CCNC: 74 U/L (ref 39–117)
ALT SERPL W P-5'-P-CCNC: 30 U/L (ref 1–41)
ANION GAP SERPL CALCULATED.3IONS-SCNC: 12.8 MMOL/L (ref 5–15)
APTT PPP: 22.6 SECONDS (ref 22.2–34.2)
AST SERPL-CCNC: 24 U/L (ref 1–40)
BACTERIA SPEC AEROBE CULT: NORMAL
BASOPHILS # BLD AUTO: 0.01 10*3/MM3 (ref 0–0.2)
BASOPHILS NFR BLD AUTO: 0.1 % (ref 0–1.5)
BILIRUB SERPL-MCNC: 0.2 MG/DL (ref 0–1.2)
BUN SERPL-MCNC: 16 MG/DL (ref 6–20)
BUN/CREAT SERPL: 15.5 (ref 7–25)
CALCIUM SPEC-SCNC: 8.7 MG/DL (ref 8.6–10.5)
CHLORIDE SERPL-SCNC: 105 MMOL/L (ref 98–107)
CO2 SERPL-SCNC: 24.2 MMOL/L (ref 22–29)
CREAT SERPL-MCNC: 1.03 MG/DL (ref 0.76–1.27)
D-LACTATE SERPL-SCNC: 1.7 MMOL/L (ref 0.5–2)
D-LACTATE SERPL-SCNC: 2.6 MMOL/L (ref 0.5–2)
DEPRECATED RDW RBC AUTO: 41 FL (ref 37–54)
EOSINOPHIL # BLD AUTO: 0.01 10*3/MM3 (ref 0–0.4)
EOSINOPHIL NFR BLD AUTO: 0.1 % (ref 0.3–6.2)
ERYTHROCYTE [DISTWIDTH] IN BLOOD BY AUTOMATED COUNT: 12.3 % (ref 12.3–15.4)
GFR SERPL CREATININE-BSD FRML MDRD: 77 ML/MIN/1.73
GLOBULIN UR ELPH-MCNC: 2.4 GM/DL
GLUCOSE SERPL-MCNC: 151 MG/DL (ref 65–99)
HCT VFR BLD AUTO: 46.4 % (ref 37.5–51)
HGB BLD-MCNC: 16.1 G/DL (ref 13–17.7)
HOLD SPECIMEN: NORMAL
HOLD SPECIMEN: NORMAL
IMM GRANULOCYTES # BLD AUTO: 0.05 10*3/MM3 (ref 0–0.05)
IMM GRANULOCYTES NFR BLD AUTO: 0.6 % (ref 0–0.5)
INR PPP: 0.95 (ref 2–3)
LYMPHOCYTES # BLD AUTO: 0.81 10*3/MM3 (ref 0.7–3.1)
LYMPHOCYTES NFR BLD AUTO: 9.9 % (ref 19.6–45.3)
MCH RBC QN AUTO: 31.7 PG (ref 26.6–33)
MCHC RBC AUTO-ENTMCNC: 34.7 G/DL (ref 31.5–35.7)
MCV RBC AUTO: 91.3 FL (ref 79–97)
MONOCYTES # BLD AUTO: 0.24 10*3/MM3 (ref 0.1–0.9)
MONOCYTES NFR BLD AUTO: 2.9 % (ref 5–12)
NEUTROPHILS NFR BLD AUTO: 7.04 10*3/MM3 (ref 1.7–7)
NEUTROPHILS NFR BLD AUTO: 86.4 % (ref 42.7–76)
NRBC BLD AUTO-RTO: 0 /100 WBC (ref 0–0.2)
NT-PROBNP SERPL-MCNC: 57 PG/ML (ref 0–450)
PLATELET # BLD AUTO: 249 10*3/MM3 (ref 140–450)
PMV BLD AUTO: 11.2 FL (ref 6–12)
POTASSIUM SERPL-SCNC: 4.1 MMOL/L (ref 3.5–5.2)
PROT SERPL-MCNC: 6.7 G/DL (ref 6–8.5)
PROTHROMBIN TIME: 10 SECONDS (ref 9.4–12)
QT INTERVAL: 324 MS
RBC # BLD AUTO: 5.08 10*6/MM3 (ref 4.14–5.8)
SODIUM SERPL-SCNC: 142 MMOL/L (ref 136–145)
TROPONIN T SERPL-MCNC: <0.01 NG/ML (ref 0–0.03)
WBC # BLD AUTO: 8.16 10*3/MM3 (ref 3.4–10.8)
WHOLE BLOOD HOLD SPECIMEN: NORMAL
WHOLE BLOOD HOLD SPECIMEN: NORMAL

## 2021-11-15 PROCEDURE — 96374 THER/PROPH/DIAG INJ IV PUSH: CPT

## 2021-11-15 PROCEDURE — 85730 THROMBOPLASTIN TIME PARTIAL: CPT | Performed by: EMERGENCY MEDICINE

## 2021-11-15 PROCEDURE — G0378 HOSPITAL OBSERVATION PER HR: HCPCS

## 2021-11-15 PROCEDURE — 99285 EMERGENCY DEPT VISIT HI MDM: CPT

## 2021-11-15 PROCEDURE — 94640 AIRWAY INHALATION TREATMENT: CPT

## 2021-11-15 PROCEDURE — 94760 N-INVAS EAR/PLS OXIMETRY 1: CPT

## 2021-11-15 PROCEDURE — 85610 PROTHROMBIN TIME: CPT | Performed by: EMERGENCY MEDICINE

## 2021-11-15 PROCEDURE — 85025 COMPLETE CBC W/AUTO DIFF WBC: CPT | Performed by: EMERGENCY MEDICINE

## 2021-11-15 PROCEDURE — 94799 UNLISTED PULMONARY SVC/PX: CPT

## 2021-11-15 PROCEDURE — 80053 COMPREHEN METABOLIC PANEL: CPT | Performed by: EMERGENCY MEDICINE

## 2021-11-15 PROCEDURE — 71045 X-RAY EXAM CHEST 1 VIEW: CPT

## 2021-11-15 PROCEDURE — 99214 OFFICE O/P EST MOD 30 MIN: CPT | Performed by: INTERNAL MEDICINE

## 2021-11-15 PROCEDURE — 36415 COLL VENOUS BLD VENIPUNCTURE: CPT

## 2021-11-15 PROCEDURE — 93010 ELECTROCARDIOGRAM REPORT: CPT | Performed by: INTERNAL MEDICINE

## 2021-11-15 PROCEDURE — 93005 ELECTROCARDIOGRAM TRACING: CPT | Performed by: EMERGENCY MEDICINE

## 2021-11-15 PROCEDURE — 84484 ASSAY OF TROPONIN QUANT: CPT | Performed by: EMERGENCY MEDICINE

## 2021-11-15 PROCEDURE — 83605 ASSAY OF LACTIC ACID: CPT | Performed by: EMERGENCY MEDICINE

## 2021-11-15 PROCEDURE — 25010000002 METHYLPREDNISOLONE PER 125 MG: Performed by: EMERGENCY MEDICINE

## 2021-11-15 PROCEDURE — 87040 BLOOD CULTURE FOR BACTERIA: CPT | Performed by: EMERGENCY MEDICINE

## 2021-11-15 PROCEDURE — 83880 ASSAY OF NATRIURETIC PEPTIDE: CPT | Performed by: EMERGENCY MEDICINE

## 2021-11-15 RX ORDER — ACETAMINOPHEN 650 MG/1
650 SUPPOSITORY RECTAL EVERY 4 HOURS PRN
Status: DISCONTINUED | OUTPATIENT
Start: 2021-11-15 | End: 2021-11-15 | Stop reason: HOSPADM

## 2021-11-15 RX ORDER — NALOXONE HCL 0.4 MG/ML
0.4 VIAL (ML) INJECTION
Status: DISCONTINUED | OUTPATIENT
Start: 2021-11-15 | End: 2021-11-15 | Stop reason: HOSPADM

## 2021-11-15 RX ORDER — BISACODYL 5 MG/1
5 TABLET, DELAYED RELEASE ORAL DAILY PRN
Status: DISCONTINUED | OUTPATIENT
Start: 2021-11-15 | End: 2021-11-15 | Stop reason: HOSPADM

## 2021-11-15 RX ORDER — ACETAMINOPHEN 160 MG/5ML
650 SOLUTION ORAL EVERY 4 HOURS PRN
Status: DISCONTINUED | OUTPATIENT
Start: 2021-11-15 | End: 2021-11-15 | Stop reason: HOSPADM

## 2021-11-15 RX ORDER — POLYETHYLENE GLYCOL 3350 17 G/17G
17 POWDER, FOR SOLUTION ORAL DAILY PRN
Status: DISCONTINUED | OUTPATIENT
Start: 2021-11-15 | End: 2021-11-15 | Stop reason: HOSPADM

## 2021-11-15 RX ORDER — FAMOTIDINE 20 MG/1
40 TABLET, FILM COATED ORAL DAILY
Status: DISCONTINUED | OUTPATIENT
Start: 2021-11-15 | End: 2021-11-15 | Stop reason: HOSPADM

## 2021-11-15 RX ORDER — ONDANSETRON 2 MG/ML
4 INJECTION INTRAMUSCULAR; INTRAVENOUS EVERY 6 HOURS PRN
Status: DISCONTINUED | OUTPATIENT
Start: 2021-11-15 | End: 2021-11-15 | Stop reason: HOSPADM

## 2021-11-15 RX ORDER — ALPRAZOLAM 0.25 MG/1
0.5 TABLET ORAL EVERY 8 HOURS PRN
Status: DISCONTINUED | OUTPATIENT
Start: 2021-11-15 | End: 2021-11-15 | Stop reason: HOSPADM

## 2021-11-15 RX ORDER — BISACODYL 10 MG
10 SUPPOSITORY, RECTAL RECTAL DAILY PRN
Status: DISCONTINUED | OUTPATIENT
Start: 2021-11-15 | End: 2021-11-15 | Stop reason: HOSPADM

## 2021-11-15 RX ORDER — HYDROCODONE BITARTRATE AND ACETAMINOPHEN 5; 325 MG/1; MG/1
1 TABLET ORAL EVERY 4 HOURS PRN
Status: DISCONTINUED | OUTPATIENT
Start: 2021-11-15 | End: 2021-11-15 | Stop reason: HOSPADM

## 2021-11-15 RX ORDER — AMOXICILLIN 250 MG
2 CAPSULE ORAL 2 TIMES DAILY
Status: DISCONTINUED | OUTPATIENT
Start: 2021-11-15 | End: 2021-11-15 | Stop reason: HOSPADM

## 2021-11-15 RX ORDER — IPRATROPIUM BROMIDE AND ALBUTEROL SULFATE 2.5; .5 MG/3ML; MG/3ML
3 SOLUTION RESPIRATORY (INHALATION)
Status: DISCONTINUED | OUTPATIENT
Start: 2021-11-15 | End: 2021-11-15 | Stop reason: HOSPADM

## 2021-11-15 RX ORDER — ACETAMINOPHEN 325 MG/1
650 TABLET ORAL EVERY 4 HOURS PRN
Status: DISCONTINUED | OUTPATIENT
Start: 2021-11-15 | End: 2021-11-15 | Stop reason: HOSPADM

## 2021-11-15 RX ORDER — HYDROCODONE BITARTRATE AND ACETAMINOPHEN 10; 325 MG/1; MG/1
1 TABLET ORAL EVERY 4 HOURS PRN
Status: DISCONTINUED | OUTPATIENT
Start: 2021-11-15 | End: 2021-11-15 | Stop reason: HOSPADM

## 2021-11-15 RX ORDER — CHOLECALCIFEROL (VITAMIN D3) 125 MCG
5 CAPSULE ORAL NIGHTLY PRN
Status: DISCONTINUED | OUTPATIENT
Start: 2021-11-15 | End: 2021-11-15 | Stop reason: HOSPADM

## 2021-11-15 RX ORDER — METHYLPREDNISOLONE SODIUM SUCCINATE 125 MG/2ML
125 INJECTION, POWDER, LYOPHILIZED, FOR SOLUTION INTRAMUSCULAR; INTRAVENOUS ONCE
Status: COMPLETED | OUTPATIENT
Start: 2021-11-15 | End: 2021-11-15

## 2021-11-15 RX ORDER — IPRATROPIUM BROMIDE AND ALBUTEROL SULFATE 2.5; .5 MG/3ML; MG/3ML
3 SOLUTION RESPIRATORY (INHALATION)
Status: COMPLETED | OUTPATIENT
Start: 2021-11-15 | End: 2021-11-15

## 2021-11-15 RX ORDER — SODIUM CHLORIDE 0.9 % (FLUSH) 0.9 %
10 SYRINGE (ML) INJECTION AS NEEDED
Status: DISCONTINUED | OUTPATIENT
Start: 2021-11-15 | End: 2021-11-15 | Stop reason: HOSPADM

## 2021-11-15 RX ORDER — ALUMINA, MAGNESIA, AND SIMETHICONE 2400; 2400; 240 MG/30ML; MG/30ML; MG/30ML
15 SUSPENSION ORAL EVERY 6 HOURS PRN
Status: DISCONTINUED | OUTPATIENT
Start: 2021-11-15 | End: 2021-11-15 | Stop reason: HOSPADM

## 2021-11-15 RX ADMIN — METHYLPREDNISOLONE SODIUM SUCCINATE 125 MG: 125 INJECTION, POWDER, FOR SOLUTION INTRAMUSCULAR; INTRAVENOUS at 01:43

## 2021-11-15 RX ADMIN — IPRATROPIUM BROMIDE AND ALBUTEROL SULFATE 3 ML: 2.5; .5 SOLUTION RESPIRATORY (INHALATION) at 01:33

## 2021-11-15 RX ADMIN — IPRATROPIUM BROMIDE AND ALBUTEROL SULFATE 3 ML: 2.5; .5 SOLUTION RESPIRATORY (INHALATION) at 01:26

## 2021-11-15 RX ADMIN — IPRATROPIUM BROMIDE AND ALBUTEROL SULFATE 3 ML: 2.5; .5 SOLUTION RESPIRATORY (INHALATION) at 01:21

## 2021-11-15 RX ADMIN — IPRATROPIUM BROMIDE AND ALBUTEROL SULFATE 3 ML: .5; 2.5 SOLUTION RESPIRATORY (INHALATION) at 13:14

## 2021-11-15 RX ADMIN — FAMOTIDINE 40 MG: 20 TABLET, FILM COATED ORAL at 09:30

## 2021-11-15 NOTE — DISCHARGE SUMMARY
[facility]   DISCHARGE SUMMARY    Patient Name: Yogesh Vicente  : 1975  MRN: [id number]    Date of Admission: 11/15/2021  Date of Discharge: 11/15/2021  Primary Care Physician: Martin Friedman MD    Consults     Date and Time Order Name Status Description    11/15/2021  2:49 AM General MD Inpatient Consult            Hospital Course     Presenting Problem:   Acute exacerbation of chronic obstructive pulmonary disease (COPD) (HCC) [J44.1]  Acute on chronic respiratory failure with hypoxia (HCC) [J96.21]    Active and resolved problems  Active Problems:    COPD with acute exacerbation (HCC)  Overview:    Acute on chronic respiratory failure with hypoxia (HCC)  Overview:  Resolved Problems:    * No resolved hospital problems. *       Hospital Course:  Yogesh Vicente is a 46 y.o. male admitted for acute on chronic COPD exacerbation because patient did not have any medications over the weekend.  Patient was admitted as observation status      Vital Signs:  Temp:  [93 °F (33.9 °C)-98 °F (36.7 °C)] 97.7 °F (36.5 °C)  Heart Rate:  [] 80  Resp:  [18-34] 18  BP: (116-138)/(83-95) 116/83  Flow (L/min):  [2-6] 2      Discharge Details        Discharge Medications      Continue These Medications      Instructions Start Date   !PATIENT HOME MEDICATIONS STORED IN PHARMACY   Does not apply, 2 Times Daily      albuterol sulfate  (90 Base) MCG/ACT inhaler  Commonly known as: PROVENTIL HFA;VENTOLIN HFA;PROAIR HFA   2 puffs, Inhalation, Every 4 Hours PRN      arformoterol 15 MCG/2ML nebulizer solution  Commonly known as: BROVANA   15 mcg, Nebulization, 2 Times Daily - RT      budesonide 0.5 MG/2ML nebulizer solution  Commonly known as: PULMICORT   0.5 mg, Nebulization, 2 Times Daily - RT      predniSONE 50 MG tablet  Commonly known as: DELTASONE   50 mg, Oral, Daily With Breakfast      Stiolto Respimat 2.5-2.5 MCG/ACT aerosol solution inhaler  Generic drug: tiotropium bromide-olodaterol   2 puffs,  Inhalation, Daily - RT             No Known Allergies      Discharge Disposition:  Home or Self Care    Diet:        Discharge Activity:         CODE STATUS:    There are no questions and answers to display.         Future Appointments   Date Time Provider Department Center   11/19/2021  1:30 PM James Morales MD [facility]           Time spent on Discharge including face to face service: 10 minutes    Electronically signed by Latoya Castro MD, 11/15/21, 8:18 AM EST.

## 2021-11-15 NOTE — OUTREACH NOTE
COPD/PN Week 1 Survey      Responses   [facility] patient discharged from? [facility]   Does the patient have one of the following disease processes/diagnoses(primary or secondary)? COPD/Pneumonia   Week 1 attempt successful? No   Revoke Readmitted          Fang Kaplan RN

## 2021-11-15 NOTE — ED PROVIDER NOTES
Time: 1:07 AM EST  Arrived by: ambulance  Chief Complaint: shortness of breath  History provided by: patient  History is limited by: N/A     History of Present Illness:  Patient is a 46 y.o. year old male that presents to the emergency department with shortness of breath.  The patient was discharged charge from the hospital and the pt has the nebulizer but was unable to get the medication for it. Pt has an inhaler that he's been using. Pt states he was sleeping and woke up experiencing shortness of breath. Pt is on home oxygen.     Pt has a history of COPD.       Shortness of Breath  Severity:  Moderate  Onset quality:  Sudden  Duration:  1 hour  Timing:  Constant  Progression:  Worsening  Chronicity:  New  Relieved by:  Nothing  Worsened by:  Nothing  Associated symptoms: no chest pain, no cough, no fever, no neck pain, no rash and no vomiting        Similar Symptoms Previously: yes  Recently seen: yes      Patient Care Team  Primary Care Provider: Martin Friedman MD  Pulmonologist: RANJITH Ayala    Past Medical History:     No Known Allergies  Past Medical History:   Diagnosis Date   • COPD (chronic obstructive pulmonary disease) (HCC)    • Empyema (HCC)    • Pneumothorax    • Pulmonary fibrosis (HCC)      Past Surgical History:   Procedure Laterality Date   • CHEST TUBE INSERTION       History reviewed. No pertinent family history.    Home Medications:  Prior to Admission medications    Medication Sig Start Date End Date Taking? Authorizing Provider   !PATIENT HOME MEDICATIONS STORED IN PHARMACY 2 (Two) Times a Day. 11/13/21   Toña Ling APRN   albuterol sulfate  (90 Base) MCG/ACT inhaler Inhale 2 puffs Every 4 (Four) Hours As Needed for Wheezing. 11/9/21   Carlos Perez DO   arformoterol (BROVANA) 15 MCG/2ML nebulizer solution Take 2 mL by nebulization 2 (Two) Times a Day for 61 days. 11/13/21 1/13/22  Toña Ling APRN   budesonide (PULMICORT) 0.5 MG/2ML nebulizer solution Take 2 mL by  "nebulization 2 (Two) Times a Day for 61 days. 11/13/21 1/13/22  Toña Ling APRN   predniSONE (DELTASONE) 50 MG tablet Take 1 tablet by mouth Daily With Breakfast for 5 days. 11/14/21 11/19/21  Toña Ling APRN   tiotropium bromide-olodaterol (Stiolto Respimat) 2.5-2.5 MCG/ACT aerosol solution inhaler Inhale 2 puffs Daily for 30 days. 10/26/21 11/25/21  Salina Dixon APRN        Social History:   Social History     Tobacco Use   • Smoking status: Current Every Day Smoker     Packs/day: 0.50     Years: 33.00     Pack years: 16.50     Types: Cigarettes   • Smokeless tobacco: Never Used   Vaping Use   • Vaping Use: Never used   Substance Use Topics   • Alcohol use: Not Currently   • Drug use: Yes     Types: Amphetamines, Marijuana     Comment: stopped using 2.5 years         Review of Systems:  Review of Systems   Constitutional: Negative for chills and fever.   HENT: Negative for nosebleeds.    Eyes: Negative for redness.   Respiratory: Positive for shortness of breath. Negative for cough.    Cardiovascular: Negative for chest pain.   Gastrointestinal: Negative for diarrhea and vomiting.   Genitourinary: Negative for dysuria and frequency.   Musculoskeletal: Negative for back pain and neck pain.   Skin: Negative for rash.   Neurological: Negative for seizures.        Physical Exam:  /95 (BP Location: Right arm, Patient Position: Sitting)   Pulse 88   Temp 98 °F (36.7 °C) (Oral)   Resp 22   Ht 172.7 cm (68\")   Wt 63.5 kg (140 lb)   SpO2 94%   BMI 21.29 kg/m²     Physical Exam  Vitals and nursing note reviewed.   Constitutional:       General: He is in acute distress (mild respiratory).      Appearance: Normal appearance.      Interventions: Nasal cannula in place.   HENT:      Head: Normocephalic and atraumatic.      Nose: Nose normal.      Mouth/Throat:      Pharynx: Oropharynx is clear.   Eyes:      General: Lids are normal. No scleral icterus.     Conjunctiva/sclera: Conjunctivae normal. "   Cardiovascular:      Rate and Rhythm: Regular rhythm. Tachycardia present.      Pulses: Normal pulses.      Heart sounds: Normal heart sounds. No murmur heard.      Pulmonary:      Effort: Accessory muscle usage, respiratory distress (mild) and retractions present.      Breath sounds: Examination of the right-upper field reveals decreased breath sounds and wheezing. Examination of the left-upper field reveals decreased breath sounds and wheezing. Examination of the right-middle field reveals decreased breath sounds and wheezing. Examination of the left-middle field reveals decreased breath sounds and wheezing. Examination of the right-lower field reveals decreased breath sounds and wheezing. Examination of the left-lower field reveals decreased breath sounds and wheezing. Decreased breath sounds (moderate to severe) and wheezing present. No rhonchi or rales.      Comments: Pt SATS are 88% on 7L nasal cannula.   Chest:      Chest wall: No tenderness.   Abdominal:      Palpations: Abdomen is soft.      Tenderness: There is no abdominal tenderness. There is no guarding or rebound.      Comments: No rigidity.   Musculoskeletal:         General: No tenderness. Normal range of motion.      Cervical back: Normal range of motion and neck supple.      Right lower leg: No edema.      Left lower leg: No edema.   Skin:     General: Skin is warm and dry.      Findings: No rash.   Neurological:      Mental Status: He is alert and oriented to person, place, and time. Mental status is at baseline.      Sensory: Sensation is intact.      Motor: Motor function is intact.   Psychiatric:         Mood and Affect: Mood normal.         Behavior: Behavior normal.                Medications in the Emergency Department:  Medications   sodium chloride 0.9 % flush 10 mL (has no administration in time range)   ipratropium-albuterol (DUO-NEB) nebulizer solution 3 mL (3 mL Nebulization Given 11/15/21 0133)   methylPREDNISolone sodium succinate  (SOLU-Medrol) injection 125 mg (125 mg Intravenous Given 11/15/21 0143)        Labs  Lab Results (last 24 hours)     Procedure Component Value Units Date/Time    CBC & Differential [[id number]]  (Abnormal) Collected: 11/15/21 0124    Specimen: Blood Updated: 11/15/21 0155    Narrative:      The following orders were created for panel order CBC & Differential.  Procedure                               Abnormality         Status                     ---------                               -----------         ------                     CBC Auto Differential[[id number]]        Abnormal            Final result                 Please view results for these tests on the individual orders.    Comprehensive Metabolic Panel [[id number]]  (Abnormal) Collected: 11/15/21 0124    Specimen: Blood Updated: 11/15/21 0220     Glucose 151 mg/dL      BUN 16 mg/dL      Creatinine 1.03 mg/dL      Sodium 142 mmol/L      Potassium 4.1 mmol/L      Comment: Slight hemolysis detected by analyzer. Results may be affected.        Chloride 105 mmol/L      CO2 24.2 mmol/L      Calcium 8.7 mg/dL      Total Protein 6.7 g/dL      Albumin 4.30 g/dL      ALT (SGPT) 30 U/L      AST (SGOT) 24 U/L      Comment: Slight hemolysis detected by analyzer. Results may be affected.        Alkaline Phosphatase 74 U/L      Total Bilirubin 0.2 mg/dL      eGFR Non African Amer 77 mL/min/1.73      Globulin 2.4 gm/dL      A/G Ratio 1.8 g/dL      BUN/Creatinine Ratio 15.5     Anion Gap 12.8 mmol/L     Narrative:      GFR Normal >60  Chronic Kidney Disease <60  Kidney Failure <15      CBC Auto Differential [[id number]]  (Abnormal) Collected: 11/15/21 0124    Specimen: Blood Updated: 11/15/21 0155     WBC 8.16 10*3/mm3      RBC 5.08 10*6/mm3      Hemoglobin 16.1 g/dL      Hematocrit 46.4 %      MCV 91.3 fL      MCH 31.7 pg      MCHC 34.7 g/dL      RDW 12.3 %      RDW-SD 41.0 fl      MPV 11.2 fL      Platelets 249 10*3/mm3      Neutrophil % 86.4 %      Lymphocyte % 9.9 %       Monocyte % 2.9 %      Eosinophil % 0.1 %      Basophil % 0.1 %      Immature Grans % 0.6 %      Neutrophils, Absolute 7.04 10*3/mm3      Lymphocytes, Absolute 0.81 10*3/mm3      Monocytes, Absolute 0.24 10*3/mm3      Eosinophils, Absolute 0.01 10*3/mm3      Basophils, Absolute 0.01 10*3/mm3      Immature Grans, Absolute 0.05 10*3/mm3      nRBC 0.0 /100 WBC     Protime-INR [[id number]]  (Abnormal) Collected: 11/15/21 0124    Specimen: Blood Updated: 11/15/21 0149     Protime 10.0 Seconds      INR 0.95    Narrative:      Suggested Therapeutic Ranges For Oral Anticoagulant Therapy:  Level of Therapy                      INR Target Range  Standard Dose                            2.0-3.0  High Dose                                2.5-3.5  Patients not receiving anticoagulant  Therapy Normal Range                     0.6-1.2    BNP [[id number]]  (Normal) Collected: 11/15/21 0124    Specimen: Blood Updated: 11/15/21 0208     proBNP 57.0 pg/mL     Narrative:      Among patients with dyspnea, NT-proBNP is highly sensitive for the detection of acute congestive heart failure. In addition NT-proBNP of <300 pg/ml effectively rules out acute congestive heart failure with 99% negative predictive value.    Results may be falsely decreased if patient taking Biotin.      Troponin [[id number]]  (Normal) Collected: 11/15/21 0124    Specimen: Blood Updated: 11/15/21 0211     Troponin T <0.010 ng/mL     Narrative:      Troponin T Reference Range:  <= 0.03 ng/mL-   Negative for AMI  >0.03 ng/mL-     Abnormal for myocardial necrosis.  Clinicians would have to utilize clinical acumen, EKG, Troponin and serial changes to determine if it is an Acute Myocardial Infarction or myocardial injury due to an underlying chronic condition.       Results may be falsely decreased if patient taking Biotin.      Lactic Acid, Plasma [[id number]]  (Abnormal) Collected: 11/15/21 0124    Specimen: Blood Updated: 11/15/21 0218     Lactate 2.6 mmol/L     Blood  Culture - Blood, Arm, Left [[id number]] Collected: 11/15/21 0124    Specimen: Blood from Arm, Left Updated: 11/15/21 0136    Blood Culture - Blood, Arm, Right [[id number]] Collected: 11/15/21 0124    Specimen: Blood from Arm, Right Updated: 11/15/21 0136    aPTT [[id number]]  (Normal) Collected: 11/15/21 0223    Specimen: Blood Updated: 11/15/21 0251     PTT 22.6 seconds            Imaging:  XR Chest 1 View    Result Date: 11/15/2021  PROCEDURE: XR CHEST 1 VW  COMPARISONS: [facility], CR, XR CHEST 1 VW, 9/28/2021, 14:55.  [facility], CT, CT CHEST, 11/09/2021, 13:03.   [facility], CR, XR CHEST 1 VW, 11/11/2021, 17:44.   [facility], CR, XR CHEST 1 VW, 11/10/2021, 2:29.   [facility], CR, XR CHEST 1 VW, 11/09/2021, 10:59.  INDICATIONS: SHORTNESS OF BREATH.  FINDINGS: Two AP upright portable views of the chest are provided for review.  Severe bullous emphysematous changes are seen, as before.  Probably no pneumothorax is identified.  No cardiac enlargement.  Chronic pleural-parenchymal scarring involves the inferior right thorax.  No acute infiltrate is suspected.  External artifacts obscure detail.  CONCLUSION: No significant interval change is suggested radiographic since the prior study from 11/11/2021.      NARCISO CRENSHAW JR, MD       Electronically Signed and Approved By: NARCISO CRENSHAW JR, MD on 11/15/2021 at 1:53               Procedures:  Procedures    Progress  ED Course as of 11/15/21 0259   Mon Nov 15, 2021   0114 EKG:    Rhythm: Sinus tachycardia  Rate: 117  Intervals: Normal NM and QT interval  T-wave: Nonspecific T wave flattening in I, aVL, III  ST Segment: Nonspecific ST segment in V3, V4, slight respiratory artifact    EKG Comparison: No change in the QRS and ST morphology from EKG performed November 10,021    Interpreted by me   [SD]      ED Course User Index  [SD] Sourav Rogers DO                            Medical Decision  Making:  MDM  Number of Diagnoses or Management Options  Acute exacerbation of chronic obstructive pulmonary disease (COPD) (HCC)  Acute on chronic respiratory failure with hypoxia (HCC)  Diagnosis management comments:       At the time of admission, the patient is resting more comfortably.  The patient's breath sounds are improved.  The patient presented today and upon arrival the patient's sats were 88% on 7 L.  We are able to wean the patient down to 3 L and patient's pulse ox is currently between 88 and 89%.  The patient is still not down to his baseline 2 L.  The patient's breath sounds are still moderately diminished and he has faint wheezing in the bases.  Patient has slight accessory muscle use but no intercostal retractions.  Subjectively, the patient states that he feels much better.  Due to the fact the patient is not at his baseline and still hypoxic on 3 L patient subsequently be admitted to the hospital for an observation status for pulmonary care.  During this time will also try to arrange so that the patient can get his home Nebules.  The patient is currently being admitted in improved condition.       Amount and/or Complexity of Data Reviewed  Clinical lab tests: reviewed  Tests in the radiology section of CPT®: reviewed  Tests in the medicine section of CPT®: reviewed  Review and summarize past medical records: yes (Pt was seen in the ED on 11/10 and admitted for COPD exacerbation. Pt was discharged home on 11/13. )  Discuss the patient with other providers: yes (I discussed the case with Dr. Castro who is agreeable for a 23-hour observation due to the patient's acute on chronic respiratory failure with hypoxia)                 Final diagnoses:   Acute on chronic respiratory failure with hypoxia (HCC)   Acute exacerbation of chronic obstructive pulmonary disease (COPD) (HCC)        Disposition:  ED Disposition     ED Disposition Condition Comment    Decision to Admit  Level of Care: Telemetry [5]    Diagnosis: Acute on chronic respiratory failure with hypoxia (HCC) [[id number]]   Admitting Physician: THANH ROMO [[id number]]   Attending Physician: THANH ROMO [[id number]]            This medical record created using voice recognition software and may contain unintended errors.    Documentation assistance provided by Yuliya Carrillo acting as scribe for Sourav Rogers DO. Information recorded by the scribe was done at my direction and has been verified and validated by me.          Yuliya Carrillo  11/15/21 0112       Sourav Rogers DO  11/15/21 0623

## 2021-11-15 NOTE — H&P
[facility]   HISTORY AND PHYSICAL    Patient Name: Yogesh Vicente  : 1975  MRN: [id number]  Primary Care Physician:  Martin Friedman MD  Date of admission: 11/15/2021    Subjective   Subjective     Chief Complaint: Shortness of breath    HPI:    Yogesh Vicente is a 46 y.o. male who was discharged on Saturday and he came back on  because he could not pick his medications from the pharmacy and he started feeling short of breath and decided to come to the emergency room where he was hypoxic and given bronchodilator therapy and then was requested to admit him as observation and hopefully we can [facility]  I spoke with , who will also see the patient and make sure patient has his nebulizers and home oxygen which was arranged over the weekend.  Apparently he did not get his supplies over the weekend    Review of Systems  Constitutional:        Weakness tiredness fatigue  Eyes:                       No blurry vision, eye discharge, eye irritation, eye pain  HEENT:                   No acute hair loss, earache and discharge, nasal congestion or discharge, sore throat, postnasal drip  Respiratory:           Shortness of breath and wheezing  Cardiovascular:     No chest pain, orthopnea, PND, dizziness, palpitation, lower extremity edema  Gastrointestinal:   No nausea vomiting diarrhea abdominal pain constipation  Genitourinary:       No urinary incontinence, hesitancy, frequency, urgency, dysuria  Neurological:        No confusion, headache, focal weakness, numbness, dysphasia  Hematologic:         No bruising, bleeding, pallor, lymphadenopathy  Endocrine:            No coldness, hot flashes, polyuria, abnormal hair growth  Musculoskeletal:    No body pains, aches, arthritic pains, muscle pain ,muscle wasting  Psychiatric:          No low or high mood, anxiety, hallucinations, delusions  Skin.                      No rash, ulcers, bruising,  itching    Personal History     Past Medical History:   Diagnosis Date   • COPD (chronic obstructive pulmonary disease) (HCC)    • Empyema (HCC)    • Pneumothorax    • Pulmonary fibrosis (HCC)        Past Surgical History:   Procedure Laterality Date   • CHEST TUBE INSERTION         Family History: family history is not on file. Otherwise pertinent FHx was reviewed and not pertinent to current issue.    Social History:  reports that he has been smoking cigarettes. He has a 16.50 pack-year smoking history. He has never used smokeless tobacco. He reports previous alcohol use. He reports current drug use. Drugs: Amphetamines and Marijuana.    Home Medications:  !PATIENT HOME MEDICATIONS STORED IN PHARMACY, albuterol sulfate HFA, arformoterol, budesonide, predniSONE, and tiotropium bromide-olodaterol      Allergies:  No Known Allergies    Objective   Objective     Vitals:   Temp:  [93 °F (33.9 °C)-98 °F (36.7 °C)] 97.7 °F (36.5 °C)  Heart Rate:  [] 80  Resp:  [18-34] 18  BP: (116-138)/(83-95) 116/83  Flow (L/min):  [2-6] 2  Physical Exam               Constitutional:         Awake, alert responsive, conversant, no obvious distress   Eyes:                       PERRLA, sclerae anicteric, no conjunctival injection   HEENT:                   Moist mucous membranes, no nasal or eye discharge, no throat congestion   Neck:                      Supple, no thyromegaly, no lymphadenopathy, trachea midline, no elevated JVD   Respiratory:           Bilateral wheezing   cardiovascular:     RRR, no murmurs, rubs, or gallops, palpable pedal pulses bilaterally,No bilateral ankle edema   Gastrointestinal:   Positive bowel sounds, soft, nontender, nondistended, no organomegaly   Musculoskeletal:   No clubbing or cyanosis to extremities, muscle wasting, joint swelling, muscle weakness   Psychiatric:             Appropriate affect, cooperative   Neurologic:            Awake alert ,oriented x 3, strength symmetric in all extremities,  Cranial Nerves grossly intact to confrontation, speech clear   Skin:                      No rashes, bruising, skin ulcers, petechiae or ecchymosis    Result Review    Result Review:  I have personally reviewed the results from the time of this admission to 11/15/2021 08:13 EST and agree with these findings:  []  Laboratory  []  Microbiology  []  Radiology  []  EKG/Telemetry   []  Cardiology/Vascular   []  Pathology  []  Old records  []  Other:    Assessment/Plan   Assessment / Plan     Active Hospital Problems:  Active Hospital Problems    Diagnosis    • Acute on chronic respiratory failure with hypoxia (HCC)        Plan:   Bronchodilator therapy  IV steroids  Oxygen supplementation    DVT prophylaxis:  No DVT prophylaxis order currently exists.    CODE STATUS:       Admission Status:  I believe this patient meets obs status.    Electronically signed by Latoya Castro MD, 11/15/21, 8:13 AM EST.

## 2021-11-15 NOTE — PLAN OF CARE
Problem: Adult Inpatient Plan of Care  Goal: Plan of Care Review  Outcome: Met  Goal: Patient-Specific Goal (Individualized)  Outcome: Met  Goal: Absence of Hospital-Acquired Illness or Injury  Outcome: Met  Intervention: Identify and Manage Fall Risk  Recent Flowsheet Documentation  Taken 11/15/2021 1143 by Tee Paez RN  Safety Promotion/Fall Prevention: safety round/check completed  Taken 11/15/2021 0940 by Tee Paez RN  Safety Promotion/Fall Prevention: safety round/check completed  Taken 11/15/2021 0750 by Tee Paez RN  Safety Promotion/Fall Prevention: safety round/check completed  Intervention: Prevent Infection  Recent Flowsheet Documentation  Taken 11/15/2021 0750 by Tee Paez RN  Infection Prevention:   personal protective equipment utilized   hand hygiene promoted   single patient room provided  Goal: Optimal Comfort and Wellbeing  Outcome: Met  Goal: Readiness for Transition of Care  Outcome: Met     Problem: Gas Exchange Impaired  Goal: Optimal Gas Exchange  Outcome: Met     Problem: Breathing Pattern Ineffective  Goal: Effective Breathing Pattern  Outcome: Met     Problem: COPD Comorbidity  Goal: Maintenance of COPD Symptom Control  Outcome: Met     Problem: Hypertension Comorbidity  Goal: Blood Pressure in Desired Range  Outcome: Met   Goal Outcome Evaluation:

## 2021-11-15 NOTE — CASE MANAGEMENT/SOCIAL WORK
Pt was discharged over the weekend and readmitted Sunday stating he was not able to get all of his medications from the pharmacy this past weekend.  Pt did receive his home oxygen, nebulizer machine, stiolto respimat.   RT CM submitted information with [employer] to [employer] for [facility] for Brovana and budesonide nebs.  [facility] is requesting further information (spirometry) to authorize these medications.  Spirometry was not available late Friday afternoon.  RT CM performed bedside spirometry this AM and will fax results to [employer].  RT CM attempted to phone [facility] pharmacy to confirm /status of budesonide, but received a message stating pharmacy was closed.      Spirometry results revealed very sever obstruction.  FVC is 2.4 L, 51% of predicted, and FEV1 is 1.04L, 28% of predicted.  Pt will need nebulized medication on discharge due to the severity of his COPD.

## 2021-11-15 NOTE — ED NOTES
Pt to ED from home per EMS with reports of SOA, minimal relief with duo neb per EMS.    Pt audibly wheezing, in moderate respiratory distress upon arrival to ED.  Pt unable to speak more than 1-2 words r/t dyspnea.  Pt reports he was dx with pulmonary fibrosis and COPD two weeks ago with recent admission.     Billie Cleveland, RN  11/15/21 0111

## 2021-11-15 NOTE — PLAN OF CARE
"Goal Outcome Evaluation:  Plan of Care Reviewed With: patient        Progress: improving  Outcome Summary: patient arrived from ED with SOA and cough. diagnosed 2 weeks prior with pulmonary fibrosis and COPD. several recent hospital admissions. pt ambulatory. minimal desaturation with exerttion. pt HR maaintained at 100, but tachycardic in 130s with exertion. Pt stated \"it felt like my heart was trying to jump out of my chest. it does that sometimes, but then stops.\" pt reported having been positive for Covid-19 in august 2021. pt reported that nebulizer treatments have been the most helpful when SOA, but pt recently unable to get nebulizer refilled at pharmacy. lactate of 2.6. patient seemingly anxious. 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 currently living [facility]. no orders placed since pt arrival on the floor. pt in no current distress.  "

## 2021-11-15 NOTE — CONSULTS
Pulmonary / Critical Care Consult Note      Patient Name: Yogesh Vicente  : 1975  MRN: [id number]  Primary Care Physician:  Martin Friedman MD  Referring Physician: No ref. provider found  Date of admission: 11/15/2021    Subjective   Subjective     Reason for Consult/ Chief Complaint: COPD, emphysema    HPI:  Yogesh Vicente is a 46 y.o. male with past medical history of severe bullous emphysema, amphetamine use now in remission, and chronic smoking.  He was discharged home on nebulizers, steroids and oxygen over the weekend.  He did not receive all the nebulizer medication, was having worsening shortness of breath.  He presented to the emergency room overnight and was given breathing treatment and admitted for observation.  He has some shortness of breath.  Pulmonary service is consulted for assistance with arrangement of nebulizer medication.  He has cough, still has significant desaturation at times.  Has increased work of breathing.  No chest pain or chest tightness.  No nausea or vomiting.      Review of Systems  Constitutional symptoms:  Denied complaints   Ear, nose, throat: Denied complaints  Cardiovascular:  chest pain, PEDRO, otherwise denied complaints  Respiratory: dyspnea, nonproductive cough, otherwise denied complaints  Gastrointestinal: Denied complaints  Musculoskeletal: Denied complaints  Genitourinary: Denied complaints  Allergy / Immunology: Denied complaints  Hematologic: Denied complaints  Neurologic: Denied complaints  Skin: Denied complaints  Endocrine: Denied complaints  Psychiatric: PTSD, otherwise denied complaints      Personal History     Past Medical History:   Diagnosis Date   • COPD (chronic obstructive pulmonary disease) (HCC)    • Empyema (HCC)    • Pneumothorax    • Pulmonary fibrosis (HCC)        Past Surgical History:   Procedure Laterality Date   • CHEST TUBE INSERTION         Family History:   No family history of chronic lung disease or lung cancer.      Social  History:  reports that he has been smoking cigarettes. He has a 16.50 pack-year smoking history. He has never used smokeless tobacco. He reports previous alcohol use. He reports current drug use. Drugs: Amphetamines and Marijuana.    Home Medications:  !PATIENT HOME MEDICATIONS STORED IN PHARMACY, albuterol sulfate HFA, arformoterol, budesonide, predniSONE, and tiotropium bromide-olodaterol    Allergies:  No Known Allergies    Objective    Objective     Vitals:   Temp:  [93 °F (33.9 °C)-98 °F (36.7 °C)] 97.7 °F (36.5 °C)  Heart Rate:  [] 80  Resp:  [18-34] 18  BP: (116-138)/(83-95) 116/83  Flow (L/min):  [2-6] 2    Physical Exam:  Vital Signs Reviewed   General: Thin male, Awake and Alert, NAD on 2 L NC    HEENT:  PERRL, EOMI.  OP, nares clear, no sinus tenderness  Neck:  Supple, no JVD, no thyromegaly  Lymph: no axillary, cervical, supraclavicular lymphadenopathy noted bilaterally  Chest:  poor aeration, barrel chested, diminished to auscultation bilaterally, tympanic to percussion bilaterally, no work of breathing noted  CV: RRR, no MGR, pulses 2+, equal  Abd:  Soft, NT, ND, + BS, no HSM  EXT:  no clubbing, no cyanosis, no edema, no joint tenderness  Neuro:  A&Ox3, CN grossly intact, no focal deficits  Skin: No rashes or lesions noted      Result Review    Result Review:  I have personally reviewed the results from the time of this admission to 11/15/2021 11:22 EST and agree with these findings:  [x]  Laboratory  [x]  Microbiology  [x]  Radiology  [x]  EKG/Telemetry   []  Cardiology/Vascular   []  Pathology  []  Old records  []  Other:  Most notable findings include: NT proBNP was normal.  Serum lactate 2.6.  WBC count 8.16.  Serum creatinine 1.03.  Chest x-ray with severe bullous emphysema, no significant change compared to last admission.    Assessment/Plan   Assessment / Plan     Active Hospital Problems:  Active Hospital Problems    Diagnosis    • Acute on chronic respiratory failure with hypoxia (HCC)     • COPD with acute exacerbation (HCC)      Pulmonary embolism negative           Impression:  COPD exacerbation  Severe bullous emphysema  Tobacco abuse  Hx of amphetamine use    Plan:  Reviewed CT of chest with patient.  Discussed the severity of his emphysema.   Continue Brovana and Pulmicort.   Continue Spirivia inhaler.  We will check bedside spirometry for confirmation of his severe obstructive airway disease.  This is required for his insurance to approve his nebulizer medications.  Continue prednisone taper over the next 15 days.  If medications could be approved today, he can likely go home with nebulizers and steroids.  Follow-up with pulmonary service in next 1 to 2 weeks.    Reviewed labs, imaging and provider notes.  Discussed with Dr. Castro.  Thank you for allowing me to participate in care of Ms Vicente.    Electronically signed by J Carlos Barnett MD, 11/15/2021, 11:22 EST.

## 2021-11-16 ENCOUNTER — TELEPHONE (OUTPATIENT)
Dept: SOCIAL WORK | Facility: HOSPITAL | Age: 46
End: 2021-11-16

## 2021-11-16 ENCOUNTER — READMISSION MANAGEMENT (OUTPATIENT)
Dept: CALL CENTER | Facility: HOSPITAL | Age: 46
End: 2021-11-16

## 2021-11-16 NOTE — OUTREACH NOTE
Prep Survey      Responses   [facility] patient discharged from? [facility]   Is LACE score < 7 ? Yes   Emergency Room discharge w/ pulse ox? No   Eligibility Not Eligible   What are the reasons patient is not eligible? Other   Does the patient have one of the following disease processes/diagnoses(primary or secondary)? Other   Prep survey completed? Yes          Ivonne Burger RN

## 2021-11-16 NOTE — TELEPHONE ENCOUNTER
RT CM received call from Mr Vicente stating that [facility] can not dispense his nebulized medications.  RT CM phoned [facility] to see what the issue was.  Pharmacist stated that he needed a PA.  RT CM explained that approval was given by [employer] because I received faxed confirmation of the approval.Pharmacist asked to re-run the script.  Pharmacist did and verified that the medications were approved.  RT CM phoned pt back to let him know [facility] will have Rx ready in a couple of hours.

## 2021-11-17 LAB
BACTERIA SPEC AEROBE CULT: ABNORMAL
GRAM STN SPEC: ABNORMAL

## 2021-11-20 LAB — BACTERIA SPEC AEROBE CULT: NORMAL

## 2021-11-21 ENCOUNTER — HOSPITAL ENCOUNTER (OUTPATIENT)
Facility: HOSPITAL | Age: 46
Setting detail: OBSERVATION
Discharge: HOME | End: 2021-11-22
Attending: EMERGENCY MEDICINE | Admitting: INTERNAL MEDICINE

## 2021-11-21 ENCOUNTER — APPOINTMENT (OUTPATIENT)
Dept: GENERAL RADIOLOGY | Facility: HOSPITAL | Age: 46
End: 2021-11-21

## 2021-11-21 DIAGNOSIS — R06.02 SHORTNESS OF BREATH: Primary | ICD-10-CM

## 2021-11-21 DIAGNOSIS — J43.9 BULLOUS EMPHYSEMA (HCC): ICD-10-CM

## 2021-11-21 DIAGNOSIS — J96.21 ACUTE ON CHRONIC RESPIRATORY FAILURE WITH HYPOXIA (HCC): ICD-10-CM

## 2021-11-21 LAB
ALBUMIN SERPL-MCNC: 4 G/DL (ref 3.5–5.2)
ALBUMIN/GLOB SERPL: 1.8 G/DL
ALP SERPL-CCNC: 70 U/L (ref 39–117)
ALT SERPL W P-5'-P-CCNC: 19 U/L (ref 1–41)
ANION GAP SERPL CALCULATED.3IONS-SCNC: 8.8 MMOL/L (ref 5–15)
AST SERPL-CCNC: 16 U/L (ref 1–40)
BASOPHILS # BLD AUTO: 0.04 10*3/MM3 (ref 0–0.2)
BASOPHILS NFR BLD AUTO: 0.2 % (ref 0–1.5)
BILIRUB SERPL-MCNC: 0.3 MG/DL (ref 0–1.2)
BUN SERPL-MCNC: 29 MG/DL (ref 6–20)
BUN/CREAT SERPL: 23.6 (ref 7–25)
CALCIUM SPEC-SCNC: 8.6 MG/DL (ref 8.6–10.5)
CHLORIDE SERPL-SCNC: 104 MMOL/L (ref 98–107)
CO2 SERPL-SCNC: 28.2 MMOL/L (ref 22–29)
COTININE UR-MCNC: POSITIVE NG/ML
CREAT SERPL-MCNC: 1.23 MG/DL (ref 0.76–1.27)
D-LACTATE SERPL-SCNC: 1.3 MMOL/L (ref 0.5–2)
DEPRECATED RDW RBC AUTO: 43.2 FL (ref 37–54)
EOSINOPHIL # BLD AUTO: 0.1 10*3/MM3 (ref 0–0.4)
EOSINOPHIL NFR BLD AUTO: 0.5 % (ref 0.3–6.2)
ERYTHROCYTE [DISTWIDTH] IN BLOOD BY AUTOMATED COUNT: 12.8 % (ref 12.3–15.4)
GFR SERPL CREATININE-BSD FRML MDRD: 63 ML/MIN/1.73
GLOBULIN UR ELPH-MCNC: 2.2 GM/DL
GLUCOSE SERPL-MCNC: 90 MG/DL (ref 65–99)
HCT VFR BLD AUTO: 54.2 % (ref 37.5–51)
HGB BLD-MCNC: 18.3 G/DL (ref 13–17.7)
HOLD SPECIMEN: NORMAL
HOLD SPECIMEN: NORMAL
IMM GRANULOCYTES # BLD AUTO: 0.31 10*3/MM3 (ref 0–0.05)
IMM GRANULOCYTES NFR BLD AUTO: 1.6 % (ref 0–0.5)
LYMPHOCYTES # BLD AUTO: 4.74 10*3/MM3 (ref 0.7–3.1)
LYMPHOCYTES NFR BLD AUTO: 24.5 % (ref 19.6–45.3)
MCH RBC QN AUTO: 31.5 PG (ref 26.6–33)
MCHC RBC AUTO-ENTMCNC: 33.8 G/DL (ref 31.5–35.7)
MCV RBC AUTO: 93.3 FL (ref 79–97)
MONOCYTES # BLD AUTO: 1.64 10*3/MM3 (ref 0.1–0.9)
MONOCYTES NFR BLD AUTO: 8.5 % (ref 5–12)
NEUTROPHILS NFR BLD AUTO: 12.48 10*3/MM3 (ref 1.7–7)
NEUTROPHILS NFR BLD AUTO: 64.7 % (ref 42.7–76)
NRBC BLD AUTO-RTO: 0 /100 WBC (ref 0–0.2)
NT-PROBNP SERPL-MCNC: 89.3 PG/ML (ref 0–450)
PLATELET # BLD AUTO: 329 10*3/MM3 (ref 140–450)
PMV BLD AUTO: 11.3 FL (ref 6–12)
POTASSIUM SERPL-SCNC: 4.1 MMOL/L (ref 3.5–5.2)
PROT SERPL-MCNC: 6.2 G/DL (ref 6–8.5)
QT INTERVAL: 332 MS
RBC # BLD AUTO: 5.81 10*6/MM3 (ref 4.14–5.8)
SODIUM SERPL-SCNC: 141 MMOL/L (ref 136–145)
TROPONIN T SERPL-MCNC: <0.01 NG/ML (ref 0–0.03)
WBC NRBC COR # BLD: 19.31 10*3/MM3 (ref 3.4–10.8)
WHOLE BLOOD HOLD SPECIMEN: NORMAL
WHOLE BLOOD HOLD SPECIMEN: NORMAL

## 2021-11-21 PROCEDURE — 83605 ASSAY OF LACTIC ACID: CPT | Performed by: EMERGENCY MEDICINE

## 2021-11-21 PROCEDURE — 84484 ASSAY OF TROPONIN QUANT: CPT | Performed by: EMERGENCY MEDICINE

## 2021-11-21 PROCEDURE — G0480 DRUG TEST DEF 1-7 CLASSES: HCPCS | Performed by: INTERNAL MEDICINE

## 2021-11-21 PROCEDURE — 94640 AIRWAY INHALATION TREATMENT: CPT

## 2021-11-21 PROCEDURE — 80053 COMPREHEN METABOLIC PANEL: CPT | Performed by: EMERGENCY MEDICINE

## 2021-11-21 PROCEDURE — 93005 ELECTROCARDIOGRAM TRACING: CPT | Performed by: EMERGENCY MEDICINE

## 2021-11-21 PROCEDURE — 83880 ASSAY OF NATRIURETIC PEPTIDE: CPT | Performed by: EMERGENCY MEDICINE

## 2021-11-21 PROCEDURE — G0378 HOSPITAL OBSERVATION PER HR: HCPCS

## 2021-11-21 PROCEDURE — 94799 UNLISTED PULMONARY SVC/PX: CPT

## 2021-11-21 PROCEDURE — 25010000002 METHYLPREDNISOLONE PER 125 MG: Performed by: EMERGENCY MEDICINE

## 2021-11-21 PROCEDURE — 25010000002 CEFTRIAXONE PER 250 MG: Performed by: EMERGENCY MEDICINE

## 2021-11-21 PROCEDURE — 99214 OFFICE O/P EST MOD 30 MIN: CPT | Performed by: INTERNAL MEDICINE

## 2021-11-21 PROCEDURE — 96375 TX/PRO/DX INJ NEW DRUG ADDON: CPT

## 2021-11-21 PROCEDURE — 85025 COMPLETE CBC W/AUTO DIFF WBC: CPT | Performed by: EMERGENCY MEDICINE

## 2021-11-21 PROCEDURE — 96365 THER/PROPH/DIAG IV INF INIT: CPT

## 2021-11-21 PROCEDURE — 25010000002 ENOXAPARIN PER 10 MG: Performed by: INTERNAL MEDICINE

## 2021-11-21 PROCEDURE — 93010 ELECTROCARDIOGRAM REPORT: CPT | Performed by: INTERNAL MEDICINE

## 2021-11-21 PROCEDURE — 96368 THER/DIAG CONCURRENT INF: CPT

## 2021-11-21 PROCEDURE — 99284 EMERGENCY DEPT VISIT MOD MDM: CPT

## 2021-11-21 PROCEDURE — 71045 X-RAY EXAM CHEST 1 VIEW: CPT

## 2021-11-21 PROCEDURE — 25010000002 AZITHROMYCIN PER 500 MG: Performed by: EMERGENCY MEDICINE

## 2021-11-21 RX ORDER — ALUMINA, MAGNESIA, AND SIMETHICONE 2400; 2400; 240 MG/30ML; MG/30ML; MG/30ML
15 SUSPENSION ORAL EVERY 6 HOURS PRN
Status: DISCONTINUED | OUTPATIENT
Start: 2021-11-21 | End: 2021-11-22 | Stop reason: HOSPADM

## 2021-11-21 RX ORDER — METHYLPREDNISOLONE SODIUM SUCCINATE 125 MG/2ML
125 INJECTION, POWDER, LYOPHILIZED, FOR SOLUTION INTRAMUSCULAR; INTRAVENOUS ONCE
Status: COMPLETED | OUTPATIENT
Start: 2021-11-21 | End: 2021-11-21

## 2021-11-21 RX ORDER — TRAZODONE HYDROCHLORIDE 50 MG/1
25 TABLET ORAL NIGHTLY PRN
COMMUNITY
Start: 2021-11-20 | End: 2021-12-20

## 2021-11-21 RX ORDER — BISACODYL 5 MG/1
5 TABLET, DELAYED RELEASE ORAL DAILY PRN
Status: DISCONTINUED | OUTPATIENT
Start: 2021-11-21 | End: 2021-11-22 | Stop reason: HOSPADM

## 2021-11-21 RX ORDER — FAMOTIDINE 20 MG/1
40 TABLET, FILM COATED ORAL DAILY
Status: DISCONTINUED | OUTPATIENT
Start: 2021-11-21 | End: 2021-11-22 | Stop reason: HOSPADM

## 2021-11-21 RX ORDER — METHYLPREDNISOLONE SODIUM SUCCINATE 125 MG/2ML
60 INJECTION, POWDER, LYOPHILIZED, FOR SOLUTION INTRAMUSCULAR; INTRAVENOUS EVERY 8 HOURS
Status: DISCONTINUED | OUTPATIENT
Start: 2021-11-21 | End: 2021-11-22

## 2021-11-21 RX ORDER — BISACODYL 10 MG
10 SUPPOSITORY, RECTAL RECTAL DAILY PRN
Status: DISCONTINUED | OUTPATIENT
Start: 2021-11-21 | End: 2021-11-22 | Stop reason: HOSPADM

## 2021-11-21 RX ORDER — AMOXICILLIN 250 MG
2 CAPSULE ORAL 2 TIMES DAILY
Status: DISCONTINUED | OUTPATIENT
Start: 2021-11-21 | End: 2021-11-22 | Stop reason: HOSPADM

## 2021-11-21 RX ORDER — CHOLECALCIFEROL (VITAMIN D3) 125 MCG
5 CAPSULE ORAL NIGHTLY PRN
Status: DISCONTINUED | OUTPATIENT
Start: 2021-11-21 | End: 2021-11-22 | Stop reason: HOSPADM

## 2021-11-21 RX ORDER — HYDROCODONE BITARTRATE AND ACETAMINOPHEN 10; 325 MG/1; MG/1
1 TABLET ORAL EVERY 4 HOURS PRN
Status: DISCONTINUED | OUTPATIENT
Start: 2021-11-21 | End: 2021-11-22 | Stop reason: HOSPADM

## 2021-11-21 RX ORDER — IPRATROPIUM BROMIDE AND ALBUTEROL SULFATE 2.5; .5 MG/3ML; MG/3ML
3 SOLUTION RESPIRATORY (INHALATION)
Status: DISCONTINUED | OUTPATIENT
Start: 2021-11-21 | End: 2021-11-22 | Stop reason: HOSPADM

## 2021-11-21 RX ORDER — IPRATROPIUM BROMIDE AND ALBUTEROL SULFATE 2.5; .5 MG/3ML; MG/3ML
3 SOLUTION RESPIRATORY (INHALATION) ONCE
Status: COMPLETED | OUTPATIENT
Start: 2021-11-21 | End: 2021-11-21

## 2021-11-21 RX ORDER — ACETAMINOPHEN 325 MG/1
650 TABLET ORAL EVERY 4 HOURS PRN
Status: DISCONTINUED | OUTPATIENT
Start: 2021-11-21 | End: 2021-11-22 | Stop reason: HOSPADM

## 2021-11-21 RX ORDER — ACETAMINOPHEN 650 MG/1
650 SUPPOSITORY RECTAL EVERY 4 HOURS PRN
Status: DISCONTINUED | OUTPATIENT
Start: 2021-11-21 | End: 2021-11-22 | Stop reason: HOSPADM

## 2021-11-21 RX ORDER — ONDANSETRON 2 MG/ML
4 INJECTION INTRAMUSCULAR; INTRAVENOUS EVERY 6 HOURS PRN
Status: DISCONTINUED | OUTPATIENT
Start: 2021-11-21 | End: 2021-11-22 | Stop reason: HOSPADM

## 2021-11-21 RX ORDER — ACETAMINOPHEN 160 MG/5ML
650 SOLUTION ORAL EVERY 4 HOURS PRN
Status: DISCONTINUED | OUTPATIENT
Start: 2021-11-21 | End: 2021-11-22 | Stop reason: HOSPADM

## 2021-11-21 RX ORDER — NALOXONE HCL 0.4 MG/ML
0.4 VIAL (ML) INJECTION
Status: DISCONTINUED | OUTPATIENT
Start: 2021-11-21 | End: 2021-11-22 | Stop reason: HOSPADM

## 2021-11-21 RX ORDER — HYDROXYZINE HYDROCHLORIDE 25 MG/1
25 TABLET, FILM COATED ORAL 4 TIMES DAILY PRN
COMMUNITY
Start: 2021-11-20 | End: 2021-11-25

## 2021-11-21 RX ORDER — POLYETHYLENE GLYCOL 3350 17 G/17G
17 POWDER, FOR SOLUTION ORAL DAILY PRN
Status: DISCONTINUED | OUTPATIENT
Start: 2021-11-21 | End: 2021-11-22 | Stop reason: HOSPADM

## 2021-11-21 RX ORDER — SODIUM CHLORIDE 0.9 % (FLUSH) 0.9 %
10 SYRINGE (ML) INJECTION AS NEEDED
Status: DISCONTINUED | OUTPATIENT
Start: 2021-11-21 | End: 2021-11-22 | Stop reason: HOSPADM

## 2021-11-21 RX ORDER — HYDROCODONE BITARTRATE AND ACETAMINOPHEN 5; 325 MG/1; MG/1
1 TABLET ORAL EVERY 4 HOURS PRN
Status: DISCONTINUED | OUTPATIENT
Start: 2021-11-21 | End: 2021-11-22 | Stop reason: HOSPADM

## 2021-11-21 RX ORDER — CEFTRIAXONE SODIUM 1 G/50ML
1 INJECTION, SOLUTION INTRAVENOUS ONCE
Status: COMPLETED | OUTPATIENT
Start: 2021-11-21 | End: 2021-11-21

## 2021-11-21 RX ORDER — ALPRAZOLAM 0.25 MG/1
0.5 TABLET ORAL EVERY 8 HOURS PRN
Status: DISCONTINUED | OUTPATIENT
Start: 2021-11-21 | End: 2021-11-22 | Stop reason: HOSPADM

## 2021-11-21 RX ADMIN — CEFTRIAXONE SODIUM 1 G: 1 INJECTION, SOLUTION INTRAVENOUS at 11:06

## 2021-11-21 RX ADMIN — IPRATROPIUM BROMIDE AND ALBUTEROL SULFATE 3 ML: .5; 2.5 SOLUTION RESPIRATORY (INHALATION) at 10:55

## 2021-11-21 RX ADMIN — METHYLPREDNISOLONE SODIUM SUCCINATE 125 MG: 125 INJECTION, POWDER, FOR SOLUTION INTRAMUSCULAR; INTRAVENOUS at 10:59

## 2021-11-21 RX ADMIN — FAMOTIDINE 40 MG: 20 TABLET, FILM COATED ORAL at 18:14

## 2021-11-21 RX ADMIN — IPRATROPIUM BROMIDE AND ALBUTEROL SULFATE 3 ML: .5; 2.5 SOLUTION RESPIRATORY (INHALATION) at 20:10

## 2021-11-21 NOTE — H&P
[facility]   HISTORY AND PHYSICAL    Patient Name: Yogesh Vicente  : 1975  MRN: [id number]  Primary Care Physician:  Martin Friedman MD  Date of admission: 2021    Subjective   Subjective     Chief Complaint: Shortness of breath    HPI:    Yogesh Vicente is a 46 y.o. male with past medical history significant for severe emphysema has been admitted again and again because of shortness of breath.  He goes out and does not  his nebulizers and up and up in the emergency room every time.  He was also at [facility] and he came back again for worsening shortness of breath and he was relatively hypoxic.  He is supposed to have 4 L of oxygen at home.  Patient was admitted with suspected COPD exacerbation.  Unfortunately has a very advanced lung disease    Review of Systems  Constitutional:        Weakness tiredness fatigue  Eyes:                       No blurry vision, eye discharge, eye irritation, eye pain  HEENT:                   No acute hair loss, earache and discharge, nasal congestion or discharge, sore throat, postnasal drip  Respiratory:           Shortness of breath and wheezing  Cardiovascular:     No chest pain, orthopnea, PND, dizziness, palpitation, lower extremity edema  Gastrointestinal:   No nausea vomiting diarrhea abdominal pain constipation  Genitourinary:       No urinary incontinence, hesitancy, frequency, urgency, dysuria  Neurological:        No confusion, headache, focal weakness, numbness, dysphasia  Hematologic:         No bruising, bleeding, pallor, lymphadenopathy  Endocrine:            No coldness, hot flashes, polyuria, abnormal hair growth  Musculoskeletal:    No body pains, aches, arthritic pains, muscle pain ,muscle wasting  Psychiatric:          No low or high mood, anxiety, hallucinations, delusions  Skin.                      No rash, ulcers, bruising, itching    Personal History     Past Medical History:   Diagnosis Date   • COPD (chronic obstructive  pulmonary disease) (HCC)    • Empyema (HCC)    • IV drug abuse (HCC)    • Pneumothorax    • Pulmonary fibrosis (HCC)        Past Surgical History:   Procedure Laterality Date   • CHEST TUBE INSERTION         Family History: family history is not on file. Otherwise pertinent FHx was reviewed and not pertinent to current issue.    Social History:  reports that he quit smoking 8 days ago. His smoking use included cigarettes. He has a 16.50 pack-year smoking history. He has never used smokeless tobacco. He reports previous alcohol use. He reports previous drug use. Drugs: Amphetamines, Methamphetamines, Morphine, and Hydrocodone.    Home Medications:  albuterol sulfate HFA, arformoterol, budesonide, hydrOXYzine, tiotropium bromide-olodaterol, and traZODone      Allergies:  No Known Allergies    Objective   Objective     Vitals:   Temp:  [97.4 °F (36.3 °C)-98.2 °F (36.8 °C)] 97.8 °F (36.6 °C)  Heart Rate:  [] 94  Resp:  [20-35] 20  BP: ()/() 116/74  Flow (L/min):  [5-10] 5  Physical Exam               Constitutional:         Awake, alert responsive, conversant, no obvious distress   Eyes:                       PERRLA, sclerae anicteric, no conjunctival injection   HEENT:                   Moist mucous membranes, no nasal or eye discharge, no throat congestion   Neck:                      Supple, no thyromegaly, no lymphadenopathy, trachea midline, no elevated JVD   Respiratory:           Clear to auscultation bilaterally, nonlabored respirations    Cardiovascular:     RRR, no murmurs, rubs, or gallops, palpable pedal pulses bilaterally,No bilateral ankle edema   Gastrointestinal:   Positive bowel sounds, soft, nontender, nondistended, no organomegaly   Musculoskeletal:   No clubbing or cyanosis to extremities, muscle wasting, joint swelling, muscle weakness   Psychiatric:             Appropriate affect, cooperative   Neurologic:            Awake alert ,oriented x 3, strength symmetric in all  extremities, Cranial Nerves grossly intact to confrontation, speech clear   Skin:                      No rashes, bruising, skin ulcers, petechiae or ecchymosis    Result Review    Result Review:  I have personally reviewed the results from the time of this admission to 11/22/2021 07:47 EST and agree with these findings:  []  Laboratory  []  Microbiology  []  Radiology  []  EKG/Telemetry   []  Cardiology/Vascular   []  Pathology  []  Old records  []  Other:    Assessment/Plan   Assessment / Plan     Active Hospital Problems:  Active Hospital Problems    Diagnosis    • Shortness of breath    • COPD with acute exacerbation (HCC)      Pulmonary embolism negative     • Pulmonary emphysema (HCC)        Plan:   Bronchodilator therapy  Already has been on steroids and antibiotics at this time there is no need for both of these medications  He will be on pulmonary rehab  We will observe patient and then possibly discharge tomorrow    DVT prophylaxis:  Medical DVT prophylaxis orders are present.    CODE STATUS:    Code Status (Patient has no pulse and is not breathing): CPR (Attempt to Resuscitate)  Medical Interventions (Patient has pulse or is breathing): Full Support    Admission Status:  I believe this patient meets observation status.    Electronically signed by Latoya Castro MD, 11/21/21, 4:18 PM EST.

## 2021-11-21 NOTE — ED NOTES
Patient recently diacharged from [facility] that they spoke to him about chest tube insertion, but decided to discharge him home instead. Usually wears 2L nasal cannula at home, but became short of air and increased his oxygen to 3L prior to coming in.      Kimberly Escobar RN  11/21/21 2439

## 2021-11-21 NOTE — CONSULTS
Pulmonary / Critical Care Consult Note      Patient Name: Yogesh Vicente  : 1975  MRN: [id number]  Primary Care Physician:  Martin Friedman MD  Referring Physician: No ref. provider found  Date of admission: 2021    Subjective   Subjective     Reason for Consult/ Chief Complaint:     Acute exacerbation of COPD with very severe bullous emphysema    HPI:  Yogesh Vicente is a 46 y.o. male who was recently admitted at [facility] for shortness of breath with acute exacerbation of COPD.  The past 6 months he has been admitted over 6x5 times at [facility] and one time at [facility] with increasing frequencies.  He denies significant cough mostly dyspnea with exertion.  CT scans show severe bullous emphysema.  He was recently discharged from [facility] approximately 1 day prior to this admission.  He was sitting at the table at home after some minimal exertion unable to catch his breath his housemates told him to come to the emergency department.  He does not exhibit signs of COPD exacerbation there is no significant cough or sputum production denies any fevers or chills.  He has not been able to  his nebulized medications because of severe shortness of breath and his multiple admissions.  Has prescriptions to  at [facility] at rest he is at or near his baseline on 4 L of oxygen he is satting in the high 90s.  He was initially requesting a chest tube.  With the belief that this may improve his dyspnea however review of the CT scan and his discussion with the pulmonologist at [facility] in [place] and his intention to have a lung transplant dissuaded him from insisting on having a chest tube.            He is a recovering addict no use of morphine to quell his dyspnea is not something that he is willing to attempt.  He has not completed pulmonary rehab which would be beneficial.    Review of Systems  Constitutional symptoms:  Denied complaints   Ear, nose, throat: Denied  complaints  Cardiovascular:  Denied complaints  Respiratory: Severe shortness of breath with minimal exertion no significant cough  Gastrointestinal: Denied complaints  Musculoskeletal: Denied complaints  Genitourinary: Denied complaints  Allergy / Immunology: Denied complaints  Hematologic: Denied complaints  Neurologic: Denied complaints  Skin: Denied complaints  Endocrine: Denied complaints  Psychiatric: Denied complaints      Personal History     Past Medical History:   Diagnosis Date   • COPD (chronic obstructive pulmonary disease) (HCC)    • Empyema (HCC)    • IV drug abuse (HCC)    • Pneumothorax    • Pulmonary fibrosis (HCC)        Past Surgical History:   Procedure Laterality Date   • CHEST TUBE INSERTION         Family History: family history is not on file. Otherwise pertinent FHx was reviewed and not pertinent to current issue.    Social History:  reports that he quit smoking 7 days ago. His smoking use included cigarettes. He has a 16.50 pack-year smoking history. He has never used smokeless tobacco. He reports previous alcohol use. He reports previous drug use. Drugs: Amphetamines, Methamphetamines, Morphine, and Hydrocodone.    Home Medications:  albuterol sulfate HFA, arformoterol, budesonide, hydrOXYzine, tiotropium bromide-olodaterol, and traZODone    Allergies:  No Known Allergies    Objective    Objective     Vitals:   Temp:  [97.6 °F (36.4 °C)-98.2 °F (36.8 °C)] 98.2 °F (36.8 °C)  Heart Rate:  [] 113  Resp:  [20-35] 20  BP: ()/() 116/88  Flow (L/min):  [5-10] 5    Physical Exam:  Vital Signs Reviewed   WDWN, Alert, NAD.    HEENT:  PERRL, EOMI.  OP, nares clear, no sinus tenderness  Neck:  Supple, no JVD, no thyromegaly  Lymph: no axillary, cervical, supraclavicular lymphadenopathy noted bilaterally  Chest:   No wheezing extremely severely prolonged expiratory phase diminished breath sounds  CV: RRR, no MGR, pulses 2+, equal.  Abd:  Soft, NT, ND, + BS, no HSM  EXT:  no clubbing,  no cyanosis, no edema, no joint tenderness  Neuro:  A&Ox3, CN grossly intact, no focal deficits.  Skin: No rashes or lesions noted      Result Review    Result Review:  I have personally reviewed the results from the time of this admission to 11/21/2021 17:44 EST and agree with these findings:  [x]  Laboratory  [x]  Microbiology  [x]  Radiology  [x]  EKG/Telemetry   []  Cardiology/Vascular   []  Pathology  [x]  Old records  []  Other:  Most notable findings include: Severe bullous emphysema  Assessment/Plan   Assessment / Plan     Active Hospital Problems:  Active Hospital Problems    Diagnosis    • Shortness of breath          Impression:  46-year-old male who has not smoked since November 14, 2021.  He is a former addict who has been clean for 2-1/2 years.  And is currently working productively in an alcohol and drug abuse recovery program.  Presents with severe dyspnea and acute on chronic hypoxic respiratory failure on exertion secondary to his severe COPD emphysema.    Plan:  1.  Hold prednisone and antibiotics I do not believe that there is evidence of COPD exacerbation.  2.  Start therapy to walk patient evaluate for appropriate ambulatory oxygen requirements and coached him on pursed lip breathing and energy conservation.  3.  Plan on referral to pulmonary rehab at discharge urgently.  This is part of prelung transplant work-up.  4.   Palliative care referral to help manage symptoms of dyspnea.  Note the patient is recovering addict and he does not want to use narcotics.  5.  I advised against chest tube placement.  Disruption of the pleura will make transplantation surgery is more difficult.  He has high risk of having a bronchopleural fistula with any thoracic intervention.  It is almost impossible to determine the optimal location for chest tube placement without understanding airflow in his thorax better CT scan has multiple septa in the bulla.        Electronically signed by ABHIJIT JOSE M.D. [facility] Services[facility]ISHA,  11/21/21, 5:44 PM EST.

## 2021-11-21 NOTE — ED PROVIDER NOTES
"Subjective   Yogesh Vicente is a 46 y.o. male with hx of COPD that presents to the ED via EMS for SOB. This started a month ago and is still present and constant. It is severe in severity. The SOB is worse with exertion.     Pt states that he was seen at [facility] for the SOB and states that he was told he had a \"hole in his lung\". He reports that they had talked about placing a chest tube, but he was ultimately discharged without it. Pt denies fever.     Pt uses 2L of O2 at home. He notes he had to increase his O2 to 5L this morning.       History provided by:  Patient      Review of Systems   Constitutional: Negative for chills, diaphoresis and fever.   HENT: Negative for ear discharge and nosebleeds.    Eyes: Negative for photophobia.   Respiratory: Positive for shortness of breath.    Cardiovascular: Negative for chest pain.   Gastrointestinal: Negative for diarrhea, nausea and vomiting.   Genitourinary: Negative for dysuria.   Musculoskeletal: Negative for back pain and neck pain.   Skin: Negative for rash.   Neurological: Negative for headaches.   All other systems reviewed and are negative.      Past Medical History:   Diagnosis Date   • COPD (chronic obstructive pulmonary disease) (HCC)    • Empyema (HCC)    • IV drug abuse (HCC)    • Pneumothorax    • Pulmonary fibrosis (HCC)        No Known Allergies    Past Surgical History:   Procedure Laterality Date   • CHEST TUBE INSERTION         History reviewed. No pertinent family history.    Social History     Socioeconomic History   • Marital status:    Tobacco Use   • Smoking status: Former Smoker     Packs/day: 0.50     Years: 33.00     Pack years: 16.50     Types: Cigarettes     Quit date: 2021     Years since quittin.0   • Smokeless tobacco: Never Used   Vaping Use   • Vaping Use: Former   • Substances: Flavoring   • Devices: Refillable tank   Substance and Sexual Activity   • Alcohol use: Not Currently   • Drug use: Not Currently     " Types: Amphetamines, Methamphetamines, Morphine, Hydrocodone     Comment: stopped using 2.5 years   • Sexual activity: Not Currently     Partners: Female         Objective   Physical Exam  Vitals and nursing note reviewed.   Constitutional:       General: He is in acute distress (mild respiratory distress ).      Appearance: Normal appearance.   HENT:      Head: Normocephalic and atraumatic.      Nose: Nose normal.   Eyes:      General: No scleral icterus.  Cardiovascular:      Rate and Rhythm: Regular rhythm. Tachycardia present.      Heart sounds: Normal heart sounds.   Pulmonary:      Effort: Respiratory distress (mild) present.      Comments: Diminished breath sounds diffusely.   Abdominal:      Palpations: Abdomen is soft.      Tenderness: There is no abdominal tenderness.   Musculoskeletal:         General: Normal range of motion.      Cervical back: Neck supple.      Right lower leg: No edema.      Left lower leg: No edema.   Skin:     General: Skin is warm and dry.   Neurological:      General: No focal deficit present.      Mental Status: He is alert and oriented to person, place, and time.      Sensory: No sensory deficit.      Motor: No weakness.         Procedures         ED Course  ED Course as of 11/21/21 1928   Sun Nov 21, 2021   1005 EKG: Sinus tachycardia with a rate of 112. Normal P waves. Q waves in V1 and V2. Normal ST segments. No acute ischemia. Unchanged from previous EKG on 11/15/21.  [KJ]      ED Course User Index  [KJ] Monisha Poe                                            [facility]    Differential diagnosis for this patient with dyspnea includes asthma or COPD exacerbation, CHF exacerbation, bronchitis or pneumonia, pneumothorax, pleural effusion or pulmonary edema, or less likely MI or PE.      This patient is a 46-year-old male with history of severe bullous emphysema and recent hospital admissions here last week and discharged from [facility] just yesterday, now presenting with  worsening dyspnea and increased oxygen requirement.    He usually uses 2 L nasal cannula oxygen at home for his COPD, and now on 5 L/min.    He denies any new fevers or cough.    He states he was discussed with regarding possibility of chest tube placement, but it was decided against.    He is now asking for chest tube to be placed to help his breathing.    I reviewed his chest x-ray which shows severe bullous emphysema minimally changed from last week's chest x-ray when he was admitted here and seen by pulmonology.    At this time I do not see a classic pneumothorax but rather severe bullous emphysema present and I will consult pulmonology.      I have consulted with pulmonology on-call, who recommends no placement of chest tube currently given no obvious pneumothorax, and only severe bullous emphysema present.    It was recommended by pulmonology to treat for COPD exacerbation for now to optimize breathing, and I am giving him DuoNeb and Solu-Medrol and a course of antibiotics given his elevated white blood cell count.    I will plan to admit him medically given his increasing oxygen requirement and dyspnea.      Final diagnoses:   Shortness of breath   Bullous emphysema (HCC)   Acute on chronic respiratory failure with hypoxia (HCC)       Documentation assistance provided by adri Poe.  Information recorded by the lizzibe was done at my direction and has been verified and validated by me.     Monisha Poe  11/21/21 1015       Michael Taylor MD  11/21/21 1926

## 2021-11-21 NOTE — PLAN OF CARE
Goal Outcome Evaluation:              Outcome Summary: admitted at end of shift. patient alert and oriented.

## 2021-11-22 VITALS
SYSTOLIC BLOOD PRESSURE: 112 MMHG | OXYGEN SATURATION: 96 % | TEMPERATURE: 98.7 F | DIASTOLIC BLOOD PRESSURE: 67 MMHG | HEART RATE: 110 BPM | RESPIRATION RATE: 18 BRPM | WEIGHT: 128.75 LBS | BODY MASS INDEX: 19.51 KG/M2 | HEIGHT: 68 IN

## 2021-11-22 PROBLEM — J96.11 CHRONIC RESPIRATORY FAILURE WITH HYPOXIA, ON HOME OXYGEN THERAPY (HCC): Status: ACTIVE | Noted: 2021-11-22

## 2021-11-22 PROBLEM — Z99.81 CHRONIC RESPIRATORY FAILURE WITH HYPOXIA, ON HOME OXYGEN THERAPY (HCC): Status: ACTIVE | Noted: 2021-11-22

## 2021-11-22 PROCEDURE — G0378 HOSPITAL OBSERVATION PER HR: HCPCS

## 2021-11-22 PROCEDURE — 99213 OFFICE O/P EST LOW 20 MIN: CPT | Performed by: INTERNAL MEDICINE

## 2021-11-22 PROCEDURE — 25010000002 METHYLPREDNISOLONE PER 125 MG: Performed by: INTERNAL MEDICINE

## 2021-11-22 PROCEDURE — 94799 UNLISTED PULMONARY SVC/PX: CPT

## 2021-11-22 PROCEDURE — 96376 TX/PRO/DX INJ SAME DRUG ADON: CPT

## 2021-11-22 RX ORDER — PREDNISONE 20 MG/1
20 TABLET ORAL
Status: DISCONTINUED | OUTPATIENT
Start: 2021-11-28 | End: 2021-11-22 | Stop reason: HOSPADM

## 2021-11-22 RX ORDER — PREDNISONE 10 MG/1
10 TABLET ORAL
Status: DISCONTINUED | OUTPATIENT
Start: 2021-12-03 | End: 2021-11-22 | Stop reason: HOSPADM

## 2021-11-22 RX ORDER — PREDNISONE 20 MG/1
40 TABLET ORAL
Qty: 10 TABLET | Refills: 0 | Status: SHIPPED | OUTPATIENT
Start: 2021-11-23 | End: 2021-11-28

## 2021-11-22 RX ORDER — PREDNISONE 10 MG/1
10 TABLET ORAL
Qty: 5 TABLET | Refills: 0 | Status: SHIPPED | OUTPATIENT
Start: 2021-12-03 | End: 2021-12-08

## 2021-11-22 RX ORDER — PREDNISONE 20 MG/1
20 TABLET ORAL
Qty: 5 TABLET | Refills: 0 | Status: SHIPPED | OUTPATIENT
Start: 2021-11-28 | End: 2021-12-03

## 2021-11-22 RX ORDER — PREDNISONE 20 MG/1
40 TABLET ORAL
Status: DISCONTINUED | OUTPATIENT
Start: 2021-11-23 | End: 2021-11-22 | Stop reason: HOSPADM

## 2021-11-22 RX ADMIN — METHYLPREDNISOLONE SODIUM SUCCINATE 60 MG: 125 INJECTION, POWDER, FOR SOLUTION INTRAMUSCULAR; INTRAVENOUS at 00:03

## 2021-11-22 RX ADMIN — IPRATROPIUM BROMIDE AND ALBUTEROL SULFATE 3 ML: .5; 2.5 SOLUTION RESPIRATORY (INHALATION) at 06:36

## 2021-11-22 RX ADMIN — IPRATROPIUM BROMIDE AND ALBUTEROL SULFATE 3 ML: .5; 2.5 SOLUTION RESPIRATORY (INHALATION) at 11:31

## 2021-11-22 RX ADMIN — METHYLPREDNISOLONE SODIUM SUCCINATE 60 MG: 125 INJECTION, POWDER, FOR SOLUTION INTRAMUSCULAR; INTRAVENOUS at 08:42

## 2021-11-22 NOTE — PROGRESS NOTES
Pulmonary / Critical   Pulmonary / Critical Care Progress Note      Patient Name: Yogesh Vicente  : 1975  MRN: [id number]  Primary Care Physician:  Martin Friedman MD  Date of admission: 2021    Subjective   Subjective   Follow-up for COPD exacerbation.    Over past 24 hours, continues on steroids and nebulizers.    No acute events overnight.     This morning,   Lying in bed on room air  On 4-5 LNC  Dyspnea unchanged  Cough is more productive after breathing treatments, otherwise mostly nonproductive  Ready to go home     Review of Systems  General: Fatigue, weakness; otherwise denied complaints  HEENT: Denied complaints  Respiratory: Shortness of breath, PEDRO, cough; otherwise denied complaints  Cardiovascular: PEDRO; otherwise denied complaints  GI: Denied complaints  Neurologic: Denied complaints  Skin: Denied complaints  Psychiatric: PTSD, otherwise denied complaints    Objective   Objective     Vitals:   Temp:  [97.4 °F (36.3 °C)-98.2 °F (36.8 °C)] 97.4 °F (36.3 °C)  Heart Rate:  [] 80  Resp:  [20-22] 20  BP: ()/() 119/80  Flow (L/min):  [5] 5    Physical Exam   Vital Signs Reviewed   General: Thin male, Awake and Alert, NAD on room air    HEENT:  PERRL, EOMI.  OP, nares clear  Neck:  Supple, no JVD, no thyromegaly  Chest:  poor aeration, barrel chested, diminished to auscultation bilaterally, no work of breathing noted at rest  CV: RRR, no MGR, pulses 2+, equal  Abd:  Soft, NT, ND, + BS, no HSM  EXT:  no clubbing, no cyanosis, no edema, no joint tenderness  Neuro:  A&Ox3, CN grossly intact, no focal deficits  Skin: No rashes or lesions noted    Result Review    Result Review:  I have personally reviewed the results from the time of this admission to 2021 11:08 EST and agree with these findings:  [x]  Laboratory  [x]  Microbiology  [x]  Radiology  [x]  EKG/Telemetry   []  Cardiology/Vascular   []  Pathology  []  Old records  []  Other:  Most notable findings include:      11/11 CXR with marked emphysematous changes, chronic atelectasis, stable subpulmonic pneumothorax on left    Assessment/Plan   Assessment / Plan     Active Hospital Problems:  Active Hospital Problems    Diagnosis    • Shortness of breath    • COPD with acute exacerbation (HCC)      Pulmonary embolism negative     • Pulmonary emphysema (HCC)      Impression:   COPD exacerbation  Severe bullous emphysema  Tobacco abuse  Hx of amphetamine use    Plan:   Okay from my standpoint to discharge patient home today  Patient will be given lung transplant information  Discussed the importance of smoking cessation  Stressed the importance of picking up his nebulizers  Would recommend a slow taper of prednisone over the next 2 weeks    DVT prophylaxis:  Medical DVT prophylaxis orders are present.    CODE STATUS:   Code Status (Patient has no pulse and is not breathing): CPR (Attempt to Resuscitate)  Medical Interventions (Patient has pulse or is breathing): Full Support    Labs, microbiology, radiology, medications, and provider notes personally reviewed.  Discussed with primary services and bedside RN.    Electronically signed by Vamsi Araiza DO, 11/22/21, 11:09 AM EST.

## 2021-11-22 NOTE — CONSULTS
Purpose of the visit was to evaluate for: goals of care/advanced care planning. Spoke with RN and patient and discussed palliative care, goals of care, care options, resuscitation status and discharge options.      Assessment: Patient on 4NT, on 5 liters nasal canula, no complaints of pain, nausea or anxiety at this time. Sitting up in bed, in good spirits and AXOX4.     Recommendations/Plan: Making a [facility] referral and will continue discussions on completing a Living Will.    Tasks Completed: Code Status clarification and Emotional Support.    Other Comments: Palliative care nurse was updated by the primary nurse and spoke to the patient at the bedside. Patient wishes to be a DNR/DNI AT ALL TIMES. Patient was educated on and provided a booklet on [facility] (Conversations that matter). Patient has three children, 2 over the age of 18 and 1 under. Leon wishes for his ex-wife Jessica Vicente to be his HCS but wants to discuss with her first. Educated on and provided a booklet on [facility] and patient is open to a referral. I explained they will not contact him for 2-3 weeks post DC. A referral was made.    Update: Patient has stated he DOES NOT want any narcotic medication even at end of life.     -DIVYA CruzN, RN, [facility]   Palliative Care    11/22/2021 13:10 EST

## 2021-11-22 NOTE — PLAN OF CARE
Goal Outcome Evaluation:  Plan of Care Reviewed With: patient        Progress: no change  Outcome Summary: Saturations low 90s on 5LNC. No issues overnight

## 2021-11-22 NOTE — DISCHARGE PLACEMENT REQUEST
"Yogesh Vicente (46 y.o. Male)             Date of Birth Social Security Number Address Home Phone MRN    1975  [address] [facility] [address] [phone] [id number]    Christianity Marital Status             None        Admission Date Admission Type Admitting Provider Attending Provider Department, Room/Bed    11/21/21 Emergency Latoya Castro MD Bhatti, Khalid M, MD [address], 4003/1    Discharge Date Discharge Disposition Discharge Destination                         Attending Provider: Latoya Castro MD    Allergies: No Known Allergies    Isolation: None   Infection: None   Code Status: No CPR   Advance Care Planning Activity    Ht: 172.7 cm (68\")   Wt: 58.4 kg (128 lb 12 oz)    Admission Cmt: None   Principal Problem: None                Active Insurance as of 11/21/2021     Primary Coverage     Payor Plan Insurance Group Employer/Plan Group    [facility] BY AL [id number]     Payor Plan Address Payor Plan Phone Number Payor Plan Fax Number Effective Dates    [address]   1/1/2021 - None Entered    [facility] [id number]       Subscriber Name Subscriber Birth Date Member ID       YOGESH VICENTE 1975 [id number]                 Emergency Contacts      (Rel.) Home Phone Work Phone Mobile Phone    Jessica Vicente (Friend) [phone] -- [phone]    Tunde Stephens (Friend) [phone] -- [phone]            Emergency Contact Information     Name Relation Home Work Mobile    Jessica Vicente Friend [phone]    Tunde Stephens Friend [phone]          "

## 2021-11-22 NOTE — DISCHARGE SUMMARY
[facility]   DISCHARGE SUMMARY    Patient Name: Yogesh Vicente  : 1975  MRN: [id number]    Date of Admission: 2021  Date of Discharge: 2021  Primary Care Physician: Martin Friedman MD    Consults     Date and Time Order Name Status Description    2021 11:00 AM Nephrology  (on-call MD unless specified)      2021 10:07 AM Pulmonology (on-call MD unless specified) Completed           Hospital Course     Presenting Problem:   Shortness of breath [R06.02]  Bullous emphysema (HCC) [J43.9]  Acute on chronic respiratory failure with hypoxia (HCC) [J96.21]    Active and resolved problems  Active Problems:    Pulmonary emphysema (HCC)  Overview:    COPD with acute exacerbation (HCC)  Overview:    Shortness of breath  Overview:    Chronic respiratory failure with hypoxia, on home oxygen therapy (HCC)  Overview:  Resolved Problems:    * No resolved hospital problems. *       Hospital Course:  Yogesh Vicente is a 46 y.o. male with past medical history significant for severe emphysema has been admitted again and again because of shortness of breath. On previous admissions,patient  Was sent home with nebulizer, but he never picked up the prescriptions.  He also stated that he does not wear his oxygen at all times, nor titrate his oxygen when he is short of breath.  Patient states his roommates call EMS before he can tell him no, and that is how he ends up in the ER. Before this admission, he went to [facility], where there was discussion about a chest tube, but pulmonary advised against it.  HE is currently on 4-5L NC and denies SOA.  Palliative care was consulted for goals of care, and patient decided he would like to be DNR.  Patient will go home on longer steroid  taper per Dr. Araiza. He will start pulmonary rehab urgently and continue to follow at [facility] for a lung transplant.  It is important that he wears his oxygen at all times and  his nebulizers  I have reviewed the  information    Vital Signs:  Temp:  [97.4 °F (36.3 °C)-98.3 °F (36.8 °C)] 98.3 °F (36.8 °C)  Heart Rate:  [] 93  Resp:  [18-22] 18  BP: ()/() 110/94  Flow (L/min):  [2-5] 5      Discharge Details        Discharge Medications      New Medications      Instructions Start Date   predniSONE 20 MG tablet  Commonly known as: DELTASONE   40 mg, Oral, Daily With Breakfast   Start Date: November 23, 2021     predniSONE 20 MG tablet  Commonly known as: DELTASONE   20 mg, Oral, Daily With Breakfast   Start Date: November 28, 2021     predniSONE 10 MG tablet  Commonly known as: DELTASONE   10 mg, Oral, Daily With Breakfast   Start Date: December 3, 2021        Continue These Medications      Instructions Start Date   albuterol sulfate  (90 Base) MCG/ACT inhaler  Commonly known as: PROVENTIL HFA;VENTOLIN HFA;PROAIR HFA   2 puffs, Inhalation, Every 4 Hours PRN      arformoterol 15 MCG/2ML nebulizer solution  Commonly known as: BROVANA   15 mcg, Nebulization, 2 Times Daily - RT      budesonide 0.5 MG/2ML nebulizer solution  Commonly known as: PULMICORT   0.5 mg, Nebulization, 2 Times Daily - RT      hydrOXYzine 25 MG tablet  Commonly known as: ATARAX   25 mg, Oral, 4 Times Daily PRN      Stiolto Respimat 2.5-2.5 MCG/ACT aerosol solution inhaler  Generic drug: tiotropium bromide-olodaterol   2 puffs, Inhalation, Daily - RT      traZODone 50 MG tablet  Commonly known as: DESYREL   25 mg, Oral, Nightly PRN             No Known Allergies      Discharge Disposition:  Home or Self Care  Outpatient pulmonary rehab  Diet:    Resume normal diet    Discharge Activity:     Resume normal activity    CODE STATUS:    Code Status and Medical Interventions:   Ordered at: 11/22/21 1314     Level Of Support Discussed With:    Patient     Code Status (Patient has no pulse and is not breathing):    No CPR (Do Not Attempt to Resuscitate)     Medical Interventions (Patient has pulse or is breathing):    Full Support      Comments:    DNI         No future appointments.        Time spent on Discharge including face to face service:  30 minutes    Electronically signed by CATHIE Sanches, 11/22/21, 3:12 PM EST.

## 2021-11-22 NOTE — PROGRESS NOTES
[facility]     Progress Note    Patient Name: Yogesh Vicente  : 1975  MRN: [id number]  Primary Care Physician:  Martin Friedman MD  Date of admission: 2021    Subjective   Patient stated his roommates call the EMS when he is short of breath and confirms he does not wear his oxygen as prescribed.  NO other issues  Review of Systems  Constitutional:        Weakness tiredness fatigue  Eyes:                       No blurry vision, eye discharge, eye irritation, eye pain  HEENT:                   No acute hair loss, earache and discharge, nasal congestion or discharge, sore throat, postnasal drip  Respiratory:           No shortness of breath coughing sputum production wheezing hemoptysis pleuritic chest pain  Cardiovascular:     No chest pain, orthopnea, PND, dizziness, palpitation, lower extremity edema  Gastrointestinal:   No nausea vomiting diarrhea abdominal pain constipation  Genitourinary:       No urinary incontinence, hesitancy, frequency, urgency, dysuria  Hematologic:         No bruising, bleeding, pallor, lymphadenopathy  Endocrine:            No coldness, hot flashes, polyuria, abnormal hair growth  Musculoskeletal:    No body pains, aches, arthritic pains, muscle pain ,muscle wasting  Psychiatric:          No low or high mood, anxiety, hallucinations, delusions  Skin.                      No rash, ulcers, bruising, itching  Neurological:        No confusion, headache, focal weakness, numbness, dysphasia    Objective   Objective     Vitals:   Temp:  [97.4 °F (36.3 °C)-98.2 °F (36.8 °C)] 97.4 °F (36.3 °C)  Heart Rate:  [] 93  Resp:  [18-22] 18  BP: ()/() 119/80  Flow (L/min):  [2-5] 5  Physical Exam    Constitutional: Awake, alert responsive, conversant, no obvious distress              Psychiatric:  Appropriate affect, cooperative   Neurologic:  Awake alert ,oriented x 3, strength symmetric in all extremities, Cranial Nerves grossly intact to confrontation, speech  clear   Eyes:   PERRLA, sclerae anicteric, no conjunctival injection   HEENT:  Moist mucous membranes, no nasal or eye discharge, no throat congestion   Neck:   Supple, no thyromegaly, no lymphadenopathy, trachea midline, no elevated JVD   Respiratory:  Clear to auscultation bilaterally, nonlabored respirations    Cardiovascular: RRR, no murmurs, rubs, or gallops, palpable pedal pulses bilaterally, No bilateral ankle edema   Gastrointestinal: Positive bowel sounds, soft, nontender, nondistended, no organomegaly   Musculoskeletal:  No clubbing or cyanosis to extremities,muscle wasting, joint swelling, muscle weakness             Skin:                      No rashes, bruising, skin ulcers, petechiae or ecchymosis    Result Review    Result Review:  I have personally reviewed the results from the time of this admission to 11/22/2021 11:57 EST and agree with these findings:  [x]  Laboratory  [x]  Microbiology  [x]  Radiology  []  EKG/Telemetry   []  Cardiology/Vascular   []  Pathology  []  Old records  []  Other:    Assessment/Plan   Assessment / Plan       Active Hospital Problems:  Active Hospital Problems    Diagnosis    • Shortness of breath    • COPD with acute exacerbation (HCC)      Pulmonary embolism negative     • Pulmonary emphysema (HCC)        Plan:   Patient educated on the need to wear oxygen continuously  Quit smoking  Prednisone taper per pulmonary  Per pulmonary, patient never picked up nebulizers that were ordered from a previous hospitalization   I have personally reviewed all the information and updated plan    Electronically signed by CATHIE Sanches, 11/22/21, 11:57 AM EST.

## 2021-11-23 ENCOUNTER — READMISSION MANAGEMENT (OUTPATIENT)
Dept: CALL CENTER | Facility: HOSPITAL | Age: 46
End: 2021-11-23

## 2021-11-23 NOTE — OUTREACH NOTE
Prep Survey      Responses   [facility] discharged from? [facility]   Is LACE score < 7 ? Yes   Emergency Room discharge w/ pulse ox? No   Eligibility Not Eligible   What are the reasons patient is not eligible? Other   Does the patient have one of the following disease processes/diagnoses(primary or secondary)? Other   Prep survey completed? Yes          Ivonne Burger RN

## 2021-11-24 ENCOUNTER — OFFICE VISIT (OUTPATIENT)
Dept: PULMONOLOGY | Facility: CLINIC | Age: 46
End: 2021-11-24

## 2021-11-24 VITALS
HEIGHT: 68 IN | HEART RATE: 118 BPM | TEMPERATURE: 99.6 F | OXYGEN SATURATION: 97 % | RESPIRATION RATE: 16 BRPM | SYSTOLIC BLOOD PRESSURE: 133 MMHG | BODY MASS INDEX: 19.4 KG/M2 | DIASTOLIC BLOOD PRESSURE: 88 MMHG | WEIGHT: 128 LBS

## 2021-11-24 DIAGNOSIS — Z72.0 TOBACCO ABUSE: ICD-10-CM

## 2021-11-24 DIAGNOSIS — J43.9 PULMONARY EMPHYSEMA, UNSPECIFIED EMPHYSEMA TYPE (HCC): Primary | ICD-10-CM

## 2021-11-24 DIAGNOSIS — J44.9 CHRONIC OBSTRUCTIVE PULMONARY DISEASE, UNSPECIFIED COPD TYPE (HCC): ICD-10-CM

## 2021-11-24 PROCEDURE — 99213 OFFICE O/P EST LOW 20 MIN: CPT | Performed by: NURSE PRACTITIONER

## 2021-11-24 RX ORDER — NICOTINE 21 MG/24HR
PATCH, TRANSDERMAL 24 HOURS TRANSDERMAL
COMMUNITY
Start: 2021-11-22

## 2021-11-24 NOTE — PROGRESS NOTES
Primary Care Provider  Martin Friedman MD   Referring Provider  No ref. provider found    Patient Complaint  Follow-up (4 week f/u), COPD, Shortness of Breath, Cough, and Wheezing      SUBJECTIVE    History of Presenting Illness  Yogesh Vicente is a 46 y.o. male who presents to [facility] patient would like to have a letter to be excused from drug court.  Patient states he is going to move back to [place] and wants to be excused from drug court.  Patient recently has been admitted and discharged from the hospital [facility] 11/10 through 2021; 11/15/2021, and 2021 for hypoxia, severe emphysema, and COPD with acute exacerbation. Patient has not started Pulmonary Rehab as he is trying to move back home to [place] by next week. He has also not picked up meds from the pharmacy.    States he has dyspnea, coughing, wheezing, headaches, but denies chest pain, weight loss or hemoptysis. Denies fevers, chills and night sweats. Yogesh Vicente is able to perform ADLs without difficulties and denies any swollen glands/lymph nodes in the head or neck.    I have personally reviewed the review of systems, past family, social, medical and surgical histories; and agree with their findings.    Review of Systems  Constitutional symptoms:  Denied complaints   Ear, nose, throat: Denied complaints  Cardiovascular:  Denied complaints  Respiratory: cough, SOA with exertion  Gastrointestinal: Denied complaints  Musculoskeletal: Denied complaints    History reviewed. No pertinent family history.     Social History     Socioeconomic History   • Marital status:    Tobacco Use   • Smoking status: Former Smoker     Packs/day: 0.50     Years: 33.00     Pack years: 16.50     Types: Cigarettes     Quit date: 2021     Years since quittin.0   • Smokeless tobacco: Never Used   Vaping Use   • Vaping Use: Former   • Substances: Flavoring   • [facility]   Substance and Sexual Activity   • Alcohol use: Not Currently   • Drug use: Not Currently     Types: Amphetamines, Methamphetamines, Morphine, Hydrocodone     Comment: stopped using 2.5 years   • Sexual activity: Not Currently     Partners: Female        Past Medical History:   Diagnosis Date   • COPD (chronic obstructive pulmonary disease) (HCC)    • Empyema (HCC)    • IV drug abuse (HCC)    • Pneumothorax    • Pulmonary fibrosis (HCC)           There is no immunization history on file for this patient.    No Known Allergies       Current Outpatient Medications:   •  albuterol sulfate  (90 Base) MCG/ACT inhaler, Inhale 2 puffs Every 4 (Four) Hours As Needed for Wheezing., Disp: 1 g, Rfl: 0  •  arformoterol (BROVANA) 15 MCG/2ML nebulizer solution, Take 2 mL by nebulization 2 (Two) Times a Day for 61 days., Disp: 120 mL, Rfl: 0  •  budesonide (PULMICORT) 0.5 MG/2ML nebulizer solution, Take 2 mL by nebulization 2 (Two) Times a Day for 61 days., Disp: 244 mL, Rfl: 0  •  hydrOXYzine (ATARAX) 25 MG tablet, Take 25 mg by mouth 4 (Four) Times a Day As Needed for Anxiety., Disp: , Rfl:   •  nicotine (NICODERM CQ) 14 MG/24HR patch, APPLY 1 PATCH TOPICALLY TO THE SKIN DAILY AS NEEDED FOR SMOKING CESSATION, Disp: , Rfl:   •  O2 (OXYGEN), Inhale 5 L/min 1 (One) Time., Disp: , Rfl:   •  [START ON 12/3/2021] predniSONE (DELTASONE) 10 MG tablet, Take 1 tablet by mouth Daily With Breakfast for 5 days., Disp: 5 tablet, Rfl: 0  •  predniSONE (DELTASONE) 20 MG tablet, Take 2 tablets by mouth Daily With Breakfast for 5 doses., Disp: 10 tablet, Rfl: 0  •  [START ON 11/28/2021] predniSONE (DELTASONE) 20 MG tablet, Take 1 tablet by mouth Daily With Breakfast for 5 doses., Disp: 5 tablet, Rfl: 0  •  tiotropium bromide-olodaterol (Stiolto Respimat) 2.5-2.5 MCG/ACT aerosol solution inhaler, Inhale 2 puffs Daily for 30 days., Disp: 1 each, Rfl: 5  •  traZODone (DESYREL) 50 MG tablet, Take 25 mg by mouth At Night As  "Needed for Sleep., Disp: , Rfl:        OBJECTIVE    Vital Signs   /88 (BP Location: Right arm, Patient Position: Sitting, Cuff Size: Adult)   Pulse 118   Temp 99.6 °F (37.6 °C)   Resp 16   Ht 172.7 cm (68\")   Wt 58.1 kg (128 lb)   SpO2 97% Comment: 5L of O2  BMI 19.46 kg/m²     Physical Exam  Vital Signs Reviewed   WDWN, Alert, NAD.    HEENT:  PERRL, EOMI.  OP, nares clear  Neck:  Supple, no JVD, no thyromegaly  Chest:  good aeration, clear to auscultation bilaterally, tympanic to percussion bilaterally, no work of breathing noted, 02 at 2L/m via NC  CV: RRR, no MGR, pulses 2+, equal.  Abd:  Soft, NT, ND, + BS, no HSM  EXT:  no clubbing, no cyanosis, no edema  Neuro:  A&Ox3, CN grossly intact, no focal deficits.  Skin: No rashes or lesions noted    Results Review  I have personally reviewed the [facility] ED records, including labs and imaging studies as well as progress notes..  CT chest scan from 11/9/2021 from [facility] ED with the following findings:  FINDINGS:          There is advanced bolus emphysematous lung disease.  There is no mediastinal or hilar   lymphadenopathy.  There are no pulmonary artery filling defects to suggest the diagnosis of   pulmonary embolism.  There is mild enlargement of the central pulmonary arteries.  The ascending   aorta is mildly dilated measuring 3.7 cm.  This is unchanged from the patient's last scan.  The   upper abdomen appears normal.       CONCLUSION:   1. Advanced bullous emphysematous lung disease  2. No evidence of pulmonary embolism  3. Mild ectasia of the ascending aorta with a maximal diameter 3.7 cm.     Chest x-ray 1 view from 11/21/2021 the following findings:  FINDINGS:          Severe emphysema and bullous disease.  Blunting the right costophrenic angle is unchanged.  No new   dense consolidation.  No pneumothorax.     CONCLUSION: No acute change from 11/15/2021    ASSESSMENT & PLAN    Patient Active Problem List   Diagnosis   • " Pneumothorax on left   • Pulmonary emphysema (HCC)   • Pulmonary fibrosis (HCC)   • Tobacco abuse   • COPD with acute exacerbation (HCC)   • COPD exacerbation (HCC)   • Acute on chronic respiratory failure with hypoxia (HCC)   • Shortness of breath   • Chronic respiratory failure with hypoxia, on home oxygen therapy (HCC)       Diagnoses and all orders for this visit:    1. Pulmonary emphysema, unspecified emphysema type (HCC) (Primary)    2. Tobacco abuse    3. Chronic obstructive pulmonary disease, unspecified COPD type (HCC)     Plan:  Informed patient if he needs a letter to be excused from drug court, he needs to  see if they have a form that they need completed and we would be happy to comply with completion of said form.    Discussed with patient need to obtain Pulmonologist once he moves to [place] and to request medical records to establish care and obtain medications for his COPD/Emphysema.    Instructed patient to get medications from pharmacy to manage his conditions.    Discussed titration of 02 to prevent shortness of air.    Smoking status current  Vaccination status: Patient declines vaccination  Medications personally reviewed    Follow Up  No follow-ups on file.    Patient was given instructions and counseling regarding his condition or for health maintenance advice. Please see specific information pulled into the AVS if appropriate.

## 2021-12-01 DIAGNOSIS — J84.10 PULMONARY FIBROSIS (HCC): Primary | ICD-10-CM

## 2021-12-08 ENCOUNTER — TELEPHONE (OUTPATIENT)
Dept: PULMONOLOGY | Facility: CLINIC | Age: 46
End: 2021-12-08

## 2021-12-08 NOTE — TELEPHONE ENCOUNTER
Called patient with appointment availability on this date, 12/08/2021.  Patient is not available to attend this date.

## 2021-12-16 ENCOUNTER — TELEPHONE (OUTPATIENT)
Dept: PULMONOLOGY | Facility: CLINIC | Age: 46
End: 2021-12-16

## 2021-12-17 ENCOUNTER — PROCEDURE VISIT (OUTPATIENT)
Dept: CARDIAC REHAB | Facility: HOSPITAL | Age: 46
End: 2021-12-17

## 2021-12-17 ENCOUNTER — HOSPITAL ENCOUNTER (OUTPATIENT)
Dept: RESPIRATORY THERAPY | Facility: HOSPITAL | Age: 46
Discharge: HOME | End: 2021-12-17

## 2021-12-17 DIAGNOSIS — J84.10 PULMONARY FIBROSIS (HCC): ICD-10-CM

## 2021-12-17 PROCEDURE — 94726 PLETHYSMOGRAPHY LUNG VOLUMES: CPT | Performed by: INTERNAL MEDICINE

## 2021-12-17 PROCEDURE — 94729 DIFFUSING CAPACITY: CPT | Performed by: INTERNAL MEDICINE

## 2021-12-17 PROCEDURE — 94618 PULMONARY STRESS TESTING: CPT

## 2021-12-17 PROCEDURE — 94060 EVALUATION OF WHEEZING: CPT | Performed by: INTERNAL MEDICINE

## 2021-12-17 PROCEDURE — 94060 EVALUATION OF WHEEZING: CPT

## 2021-12-17 PROCEDURE — 94729 DIFFUSING CAPACITY: CPT

## 2021-12-17 PROCEDURE — 94726 PLETHYSMOGRAPHY LUNG VOLUMES: CPT

## 2021-12-17 RX ORDER — ALBUTEROL SULFATE 2.5 MG/3ML
2.5 SOLUTION RESPIRATORY (INHALATION) ONCE
Status: COMPLETED | OUTPATIENT
Start: 2021-12-17 | End: 2021-12-17

## 2021-12-17 RX ADMIN — ALBUTEROL SULFATE 2.5 MG: 2.5 SOLUTION RESPIRATORY (INHALATION) at 09:51

## 2022-01-12 ENCOUNTER — TELEPHONE (OUTPATIENT)
Dept: PULMONOLOGY | Facility: CLINIC | Age: 47
End: 2022-01-12

## 2022-01-12 NOTE — TELEPHONE ENCOUNTER
Office visit with CATHIE Torres, pulmonary function study with interpretation and 6 minute walk released to patient at his request.

## 2022-03-16 ENCOUNTER — APPOINTMENT (OUTPATIENT)
Dept: RESPIRATORY THERAPY | Facility: HOSPITAL | Age: 47
End: 2022-03-16

## 2022-03-16 ENCOUNTER — APPOINTMENT (OUTPATIENT)
Dept: CARDIAC REHAB | Facility: HOSPITAL | Age: 47
End: 2022-03-16